# Patient Record
Sex: FEMALE | Race: WHITE | NOT HISPANIC OR LATINO | Employment: FULL TIME | URBAN - METROPOLITAN AREA
[De-identification: names, ages, dates, MRNs, and addresses within clinical notes are randomized per-mention and may not be internally consistent; named-entity substitution may affect disease eponyms.]

---

## 2018-06-27 ENCOUNTER — HOSPITAL ENCOUNTER (OUTPATIENT)
Facility: MEDICAL CENTER | Age: 28
DRG: 099 | End: 2018-06-27
Payer: COMMERCIAL

## 2018-06-28 ENCOUNTER — HOSPITAL ENCOUNTER (INPATIENT)
Facility: MEDICAL CENTER | Age: 28
LOS: 4 days | DRG: 099 | End: 2018-07-02
Attending: INTERNAL MEDICINE | Admitting: INTERNAL MEDICINE
Payer: COMMERCIAL

## 2018-06-28 PROBLEM — G03.9 MENINGITIS: Status: ACTIVE | Noted: 2018-06-28

## 2018-06-28 PROBLEM — E66.811 CLASS 1 OBESITY DUE TO EXCESS CALORIES WITHOUT SERIOUS COMORBIDITY WITH BODY MASS INDEX (BMI) OF 32.0 TO 32.9 IN ADULT: Status: ACTIVE | Noted: 2018-06-28

## 2018-06-28 PROBLEM — G43.009 MIGRAINE WITHOUT AURA AND WITHOUT STATUS MIGRAINOSUS, NOT INTRACTABLE: Status: ACTIVE | Noted: 2018-06-28

## 2018-06-28 PROBLEM — E66.09 CLASS 1 OBESITY DUE TO EXCESS CALORIES WITHOUT SERIOUS COMORBIDITY WITH BODY MASS INDEX (BMI) OF 32.0 TO 32.9 IN ADULT: Status: ACTIVE | Noted: 2018-06-28

## 2018-06-28 PROBLEM — O02.1 FETAL DEMISE BEFORE 20 WEEKS WITH RETENTION OF DEAD FETUS: Status: ACTIVE | Noted: 2018-06-28

## 2018-06-28 LAB
ANION GAP SERPL CALC-SCNC: 8 MMOL/L (ref 0–11.9)
B-HCG SERPL-ACNC: ABNORMAL MIU/ML (ref 0–5)
BASOPHILS # BLD AUTO: 0.3 % (ref 0–1.8)
BASOPHILS # BLD: 0.02 K/UL (ref 0–0.12)
BUN SERPL-MCNC: 6 MG/DL (ref 8–22)
CALCIUM SERPL-MCNC: 7.6 MG/DL (ref 8.5–10.5)
CHLORIDE SERPL-SCNC: 109 MMOL/L (ref 96–112)
CO2 SERPL-SCNC: 24 MMOL/L (ref 20–33)
CREAT SERPL-MCNC: 0.56 MG/DL (ref 0.5–1.4)
EOSINOPHIL # BLD AUTO: 0.16 K/UL (ref 0–0.51)
EOSINOPHIL NFR BLD: 2.6 % (ref 0–6.9)
ERYTHROCYTE [DISTWIDTH] IN BLOOD BY AUTOMATED COUNT: 47.5 FL (ref 35.9–50)
EST. AVERAGE GLUCOSE BLD GHB EST-MCNC: 103 MG/DL
GLUCOSE SERPL-MCNC: 94 MG/DL (ref 65–99)
HBA1C MFR BLD: 5.2 % (ref 0–5.6)
HCT VFR BLD AUTO: 34.7 % (ref 37–47)
HGB BLD-MCNC: 11.7 G/DL (ref 12–16)
IMM GRANULOCYTES # BLD AUTO: 0.02 K/UL (ref 0–0.11)
IMM GRANULOCYTES NFR BLD AUTO: 0.3 % (ref 0–0.9)
LYMPHOCYTES # BLD AUTO: 2.28 K/UL (ref 1–4.8)
LYMPHOCYTES NFR BLD: 37.1 % (ref 22–41)
MCH RBC QN AUTO: 29.4 PG (ref 27–33)
MCHC RBC AUTO-ENTMCNC: 33.7 G/DL (ref 33.6–35)
MCV RBC AUTO: 87.2 FL (ref 81.4–97.8)
MONOCYTES # BLD AUTO: 0.61 K/UL (ref 0–0.85)
MONOCYTES NFR BLD AUTO: 9.9 % (ref 0–13.4)
NEUTROPHILS # BLD AUTO: 3.06 K/UL (ref 2–7.15)
NEUTROPHILS NFR BLD: 49.8 % (ref 44–72)
NRBC # BLD AUTO: 0 K/UL
NRBC BLD-RTO: 0 /100 WBC
PLATELET # BLD AUTO: 202 K/UL (ref 164–446)
PMV BLD AUTO: 9.6 FL (ref 9–12.9)
POTASSIUM SERPL-SCNC: 3.2 MMOL/L (ref 3.6–5.5)
RBC # BLD AUTO: 3.98 M/UL (ref 4.2–5.4)
SODIUM SERPL-SCNC: 141 MMOL/L (ref 135–145)
VANCOMYCIN TROUGH SERPL-MCNC: 12.1 UG/ML (ref 10–20)
WBC # BLD AUTO: 6.2 K/UL (ref 4.8–10.8)

## 2018-06-28 PROCEDURE — A9270 NON-COVERED ITEM OR SERVICE: HCPCS | Performed by: HOSPITALIST

## 2018-06-28 PROCEDURE — 770020 HCHG ROOM/CARE - TELE (206)

## 2018-06-28 PROCEDURE — 85025 COMPLETE CBC W/AUTO DIFF WBC: CPT

## 2018-06-28 PROCEDURE — 80202 ASSAY OF VANCOMYCIN: CPT

## 2018-06-28 PROCEDURE — 700102 HCHG RX REV CODE 250 W/ 637 OVERRIDE(OP): Performed by: HOSPITALIST

## 2018-06-28 PROCEDURE — 700105 HCHG RX REV CODE 258: Performed by: HOSPITALIST

## 2018-06-28 PROCEDURE — 83036 HEMOGLOBIN GLYCOSYLATED A1C: CPT

## 2018-06-28 PROCEDURE — 80048 BASIC METABOLIC PNL TOTAL CA: CPT

## 2018-06-28 PROCEDURE — 700111 HCHG RX REV CODE 636 W/ 250 OVERRIDE (IP): Performed by: HOSPITALIST

## 2018-06-28 PROCEDURE — 84702 CHORIONIC GONADOTROPIN TEST: CPT

## 2018-06-28 PROCEDURE — 99223 1ST HOSP IP/OBS HIGH 75: CPT | Performed by: HOSPITALIST

## 2018-06-28 PROCEDURE — 36415 COLL VENOUS BLD VENIPUNCTURE: CPT

## 2018-06-28 PROCEDURE — 99255 IP/OBS CONSLTJ NEW/EST HI 80: CPT | Performed by: INTERNAL MEDICINE

## 2018-06-28 RX ORDER — AMOXICILLIN 250 MG
2 CAPSULE ORAL 2 TIMES DAILY
Status: DISCONTINUED | OUTPATIENT
Start: 2018-06-28 | End: 2018-07-02 | Stop reason: HOSPADM

## 2018-06-28 RX ORDER — ONDANSETRON 4 MG/1
4 TABLET, ORALLY DISINTEGRATING ORAL EVERY 4 HOURS PRN
Status: DISCONTINUED | OUTPATIENT
Start: 2018-06-28 | End: 2018-07-02 | Stop reason: HOSPADM

## 2018-06-28 RX ORDER — POTASSIUM CHLORIDE 20 MEQ/1
60 TABLET, EXTENDED RELEASE ORAL
Status: COMPLETED | OUTPATIENT
Start: 2018-06-28 | End: 2018-06-28

## 2018-06-28 RX ORDER — PROMETHAZINE HYDROCHLORIDE 25 MG/1
12.5-25 SUPPOSITORY RECTAL EVERY 4 HOURS PRN
Status: DISCONTINUED | OUTPATIENT
Start: 2018-06-28 | End: 2018-07-02 | Stop reason: HOSPADM

## 2018-06-28 RX ORDER — SODIUM CHLORIDE 9 MG/ML
INJECTION, SOLUTION INTRAVENOUS CONTINUOUS
Status: DISCONTINUED | OUTPATIENT
Start: 2018-06-28 | End: 2018-07-01

## 2018-06-28 RX ORDER — BISACODYL 10 MG
10 SUPPOSITORY, RECTAL RECTAL
Status: DISCONTINUED | OUTPATIENT
Start: 2018-06-28 | End: 2018-07-02 | Stop reason: HOSPADM

## 2018-06-28 RX ORDER — CLONIDINE HYDROCHLORIDE 0.1 MG/1
0.1 TABLET ORAL EVERY 6 HOURS PRN
Status: DISCONTINUED | OUTPATIENT
Start: 2018-06-28 | End: 2018-06-28

## 2018-06-28 RX ORDER — ONDANSETRON 2 MG/ML
4 INJECTION INTRAMUSCULAR; INTRAVENOUS EVERY 4 HOURS PRN
Status: DISCONTINUED | OUTPATIENT
Start: 2018-06-28 | End: 2018-07-02 | Stop reason: HOSPADM

## 2018-06-28 RX ORDER — PROMETHAZINE HYDROCHLORIDE 25 MG/1
12.5-25 TABLET ORAL EVERY 4 HOURS PRN
Status: DISCONTINUED | OUTPATIENT
Start: 2018-06-28 | End: 2018-07-02 | Stop reason: HOSPADM

## 2018-06-28 RX ORDER — OXYCODONE HYDROCHLORIDE 5 MG/1
5 TABLET ORAL
Status: DISCONTINUED | OUTPATIENT
Start: 2018-06-28 | End: 2018-07-02 | Stop reason: HOSPADM

## 2018-06-28 RX ORDER — MORPHINE SULFATE 4 MG/ML
2 INJECTION, SOLUTION INTRAMUSCULAR; INTRAVENOUS
Status: DISCONTINUED | OUTPATIENT
Start: 2018-06-28 | End: 2018-07-02 | Stop reason: HOSPADM

## 2018-06-28 RX ORDER — OXYCODONE HYDROCHLORIDE 5 MG/1
2.5 TABLET ORAL
Status: DISCONTINUED | OUTPATIENT
Start: 2018-06-28 | End: 2018-07-02 | Stop reason: HOSPADM

## 2018-06-28 RX ORDER — ACETAMINOPHEN 325 MG/1
650 TABLET ORAL EVERY 6 HOURS PRN
Status: DISCONTINUED | OUTPATIENT
Start: 2018-06-28 | End: 2018-07-02 | Stop reason: HOSPADM

## 2018-06-28 RX ORDER — POLYETHYLENE GLYCOL 3350 17 G/17G
1 POWDER, FOR SOLUTION ORAL
Status: DISCONTINUED | OUTPATIENT
Start: 2018-06-28 | End: 2018-07-02 | Stop reason: HOSPADM

## 2018-06-28 RX ADMIN — ENOXAPARIN SODIUM 40 MG: 100 INJECTION SUBCUTANEOUS at 12:24

## 2018-06-28 RX ADMIN — AMPICILLIN SODIUM 2000 MG: 2 INJECTION, POWDER, FOR SOLUTION INTRAMUSCULAR; INTRAVENOUS at 12:25

## 2018-06-28 RX ADMIN — MORPHINE SULFATE 2 MG: 4 INJECTION INTRAVENOUS at 11:44

## 2018-06-28 RX ADMIN — SODIUM CHLORIDE: 9 INJECTION, SOLUTION INTRAVENOUS at 06:21

## 2018-06-28 RX ADMIN — SODIUM CHLORIDE: 9 INJECTION, SOLUTION INTRAVENOUS at 15:40

## 2018-06-28 RX ADMIN — ACYCLOVIR SODIUM 616 MG: 500 INJECTION, SOLUTION INTRAVENOUS at 06:38

## 2018-06-28 RX ADMIN — OXYCODONE HYDROCHLORIDE 5 MG: 5 TABLET ORAL at 12:33

## 2018-06-28 RX ADMIN — VANCOMYCIN HYDROCHLORIDE 1300 MG: 100 INJECTION, POWDER, LYOPHILIZED, FOR SOLUTION INTRAVENOUS at 15:40

## 2018-06-28 RX ADMIN — CEFTRIAXONE 2 G: 2 INJECTION, POWDER, FOR SOLUTION INTRAMUSCULAR; INTRAVENOUS at 06:02

## 2018-06-28 RX ADMIN — AMPICILLIN SODIUM 2000 MG: 2 INJECTION, POWDER, FOR SOLUTION INTRAMUSCULAR; INTRAVENOUS at 14:18

## 2018-06-28 RX ADMIN — MORPHINE SULFATE 2 MG: 4 INJECTION INTRAVENOUS at 22:50

## 2018-06-28 RX ADMIN — ACYCLOVIR SODIUM 616 MG: 500 INJECTION, SOLUTION INTRAVENOUS at 14:25

## 2018-06-28 RX ADMIN — PROCHLORPERAZINE EDISYLATE 10 MG: 5 INJECTION INTRAMUSCULAR; INTRAVENOUS at 14:18

## 2018-06-28 RX ADMIN — CEFTRIAXONE 2 G: 2 INJECTION, POWDER, FOR SOLUTION INTRAMUSCULAR; INTRAVENOUS at 21:49

## 2018-06-28 RX ADMIN — OXYCODONE HYDROCHLORIDE 5 MG: 5 TABLET ORAL at 21:49

## 2018-06-28 RX ADMIN — VANCOMYCIN HYDROCHLORIDE 1300 MG: 100 INJECTION, POWDER, LYOPHILIZED, FOR SOLUTION INTRAVENOUS at 22:42

## 2018-06-28 RX ADMIN — POTASSIUM CHLORIDE 60 MEQ: 1500 TABLET, EXTENDED RELEASE ORAL at 23:36

## 2018-06-28 RX ADMIN — OXYCODONE HYDROCHLORIDE 5 MG: 5 TABLET ORAL at 06:02

## 2018-06-28 RX ADMIN — OXYCODONE HYDROCHLORIDE 5 MG: 5 TABLET ORAL at 15:40

## 2018-06-28 ASSESSMENT — COPD QUESTIONNAIRES
DO YOU EVER COUGH UP ANY MUCUS OR PHLEGM?: NO/ONLY WITH OCCASIONAL COLDS OR INFECTIONS
IN THE PAST 12 MONTHS DO YOU DO LESS THAN YOU USED TO BECAUSE OF YOUR BREATHING PROBLEMS: DISAGREE/UNSURE
DURING THE PAST 4 WEEKS HOW MUCH DID YOU FEEL SHORT OF BREATH: NONE/LITTLE OF THE TIME
HAVE YOU SMOKED AT LEAST 100 CIGARETTES IN YOUR ENTIRE LIFE: NO/DON'T KNOW
COPD SCREENING SCORE: 0

## 2018-06-28 ASSESSMENT — COGNITIVE AND FUNCTIONAL STATUS - GENERAL
SUGGESTED CMS G CODE MODIFIER DAILY ACTIVITY: CH
MOBILITY SCORE: 24
DAILY ACTIVITIY SCORE: 24
SUGGESTED CMS G CODE MODIFIER MOBILITY: CH

## 2018-06-28 ASSESSMENT — ENCOUNTER SYMPTOMS
CARDIOVASCULAR NEGATIVE: 1
PSYCHIATRIC NEGATIVE: 1
RESPIRATORY NEGATIVE: 1
FEVER: 1
HEADACHES: 1
EYES NEGATIVE: 1
CHILLS: 1
GASTROINTESTINAL NEGATIVE: 1
BACK PAIN: 1
NECK PAIN: 1

## 2018-06-28 ASSESSMENT — PATIENT HEALTH QUESTIONNAIRE - PHQ9
SUM OF ALL RESPONSES TO PHQ9 QUESTIONS 1 AND 2: 0
1. LITTLE INTEREST OR PLEASURE IN DOING THINGS: NOT AT ALL
2. FEELING DOWN, DEPRESSED, IRRITABLE, OR HOPELESS: NOT AT ALL

## 2018-06-28 ASSESSMENT — PAIN SCALES - GENERAL
PAINLEVEL_OUTOF10: 3
PAINLEVEL_OUTOF10: 7
PAINLEVEL_OUTOF10: 6
PAINLEVEL_OUTOF10: 6
PAINLEVEL_OUTOF10: 5
PAINLEVEL_OUTOF10: 5
PAINLEVEL_OUTOF10: 3
PAINLEVEL_OUTOF10: 7
PAINLEVEL_OUTOF10: 6

## 2018-06-28 ASSESSMENT — LIFESTYLE VARIABLES
HOW MANY TIMES IN THE PAST YEAR HAVE YOU HAD 5 OR MORE DRINKS IN A DAY: 0
HAVE YOU EVER FELT YOU SHOULD CUT DOWN ON YOUR DRINKING: NO
EVER_SMOKED: NEVER
TOTAL SCORE: 0
ALCOHOL_USE: YES
AVERAGE NUMBER OF DAYS PER WEEK YOU HAVE A DRINK CONTAINING ALCOHOL: 1
EVER HAD A DRINK FIRST THING IN THE MORNING TO STEADY YOUR NERVES TO GET RID OF A HANGOVER: NO
HAVE PEOPLE ANNOYED YOU BY CRITICIZING YOUR DRINKING: NO
ON A TYPICAL DAY WHEN YOU DRINK ALCOHOL HOW MANY DRINKS DO YOU HAVE: 2
EVER FELT BAD OR GUILTY ABOUT YOUR DRINKING: NO
TOTAL SCORE: 0
TOTAL SCORE: 0
CONSUMPTION TOTAL: NEGATIVE

## 2018-06-28 NOTE — CARE PLAN
Problem: Communication  Goal: The ability to communicate needs accurately and effectively will improve  Outcome: PROGRESSING AS EXPECTED  Discussed POC, all questions and concerns addressed. Discussed use of call light. Pt understands appropriate use.

## 2018-06-28 NOTE — PROGRESS NOTES
Direct Admit from Dr. Conley at [facility]. Dr. Lopez accepting for Meningitis. Patient on Droplet Precautions. ADT signed and held and will need to be released upon patient arrival. Patient arriving via ground transport.

## 2018-06-28 NOTE — ASSESSMENT & PLAN NOTE
Suspected bacterial meningitis  Still awaiting final culture results from LP done at [facility]  Remains on iv ceftriaxone  ID following   Clinically sx have resolved  following

## 2018-06-28 NOTE — PROGRESS NOTES
"Pharmacy Kinetics 28 y.o. female on vancomycin day # 2 2018    Currently Dose: Vancomycin 1300 mg iv q8hr (~14 mg/kg/dose)  Received Load Dose: No (dosed 19 mg/kg x1 at OSH)    Indication for Treatment: meningitis  ID Service Following: No    Pertinent history per medical record: Admitted on 2018 for suspected meningitis from [facility] ([place], [place]). Noted HA per patient prior to arrival. LP at outside facility pending confirmation.     Other antibiotics: ceftriaxone 2 gm iv q12h, acyclovir ~10 mg/kg q8h    Allergies: Radish [raphanus sativus]; Sulfa drugs; and Vancomycin     List concerns for accumulation of vancomycin: recent pregnancy (suspected on transfer H&P)    Pertinent cultures to date:   Results     ** No results found for the last 168 hours. **        Intake/Output Summary (Last 24 hours) at 18 0929  Last data filed at 18 0900   Gross per 24 hour   Intake              480 ml   Output                1 ml   Net              479 ml      Blood pressure 108/57, pulse 79, temperature 37.5 °C (99.5 °F), resp. rate 18, height 1.702 m (5' 7\"), weight 92.7 kg (204 lb 5.9 oz), SpO2 96 %, not currently breastfeeding. Temp (24hrs), Av.6 °C (99.6 °F), Min:37.1 °C (98.7 °F), Max:38.1 °C (100.6 °F)    A/P   1. Vancomycin dose change: not indicated   2. Next vancomycin level: 18 @2130  3. Goal trough: 18-22 mcg/mL  4. Comments: VS stable. Febrile to Tmax 100.6 °F. Microbiology pending. [facility] C/S results pending. SCr unremarkable on transfer documentation. Factors for accumulation noted. Trough in place prior to PM dose to ensure clearance. Pharmacy will follow and continue to adjust as appropriate.    Jason Mckeon, PharmD  "

## 2018-06-28 NOTE — ASSESSMENT & PLAN NOTE
Patient still has not passed remains, no cramping or spotting  bhcg decreasing  Plans to follow up with her ob as an outpatient

## 2018-06-28 NOTE — H&P
Hospital Medicine History and Physical    Date of Service  6/28/2018    Chief Complaint  Headache     History of Presenting Illness  28 y.o. female with history of recurrent migraine disorder, recent miscarriage with current 6 week fetal demise was in her usual state of health until 2AM on 26 June 2018, when she had the sudden onset of severe headache.  She initially attributed this to migraine, however she reports that it was markedly worse than normal, and additionally associated with neck and back pain.  She denies any recent sick contacts, she initially had no fever or chills.  She subsequently presented to [place], where she was admitted for possible meningitis.  She did undergo brain imaging as well as lumbar puncture, and then was transferred to this facility for insurance reasons.  She does report worsening of her symptoms with light exposure, and does feel much better after the treatment she has already received.    Primary Care Physician  No primary care provider on file.    Consultants  none    Code Status  Full code     Review of Systems  Review of Systems   Constitutional: Positive for chills, fever and malaise/fatigue.   HENT: Negative.    Eyes: Negative.    Respiratory: Negative.    Cardiovascular: Negative.    Gastrointestinal: Negative.    Genitourinary: Negative.    Musculoskeletal: Positive for back pain and neck pain.   Skin: Negative.    Neurological: Positive for headaches.   Endo/Heme/Allergies: Negative.    Psychiatric/Behavioral: Negative.         Past Medical History  Past Medical History:   Diagnosis Date   • Migraine        Surgical History  Past Surgical History:   Procedure Laterality Date   • OTHER ORTHOPEDIC SURGERY         Medications  No current facility-administered medications on file prior to encounter.      No current outpatient prescriptions on file prior to encounter.       Family History  Family History   Problem Relation Age of Onset   • No Known Problems Mother    • No  Known Problems Father        Social History  Social History   Substance Use Topics   • Smoking status: Former Smoker   • Smokeless tobacco: Never Used   • Alcohol use Not on file       Allergies  Allergies   Allergen Reactions   • Radish [Raphanus Sativus] Hives   • Sulfa Drugs Hives   • Vancomycin Rash     Pt currently red and blotchy on anterior and posterior thoracic area from previous dose of Vanco.        Physical Exam  Laboratory   Hemodynamics  Temp (24hrs), Av.6 °C (99.7 °F), Min:37.1 °C (98.7 °F), Max:38.1 °C (100.6 °F)   Temperature: (!) 38.1 °C (100.6 °F) (RN notified)  Pulse  Av.5  Min: 71  Max: 74    Blood Pressure: (!) 97/47 (RN notified)      Respiratory      Respiration: 12, Pulse Oximetry: 96 %        RUL Breath Sounds: Clear, RML Breath Sounds: Diminished, RLL Breath Sounds: Diminished, CAS Breath Sounds: Clear, LLL Breath Sounds: Diminished    Physical Exam   Constitutional: She is oriented to person, place, and time. She appears well-developed and well-nourished. No distress.   HENT:   Head: Normocephalic and atraumatic.   Eyes: Conjunctivae are normal. Pupils are equal, round, and reactive to light.   Neck: Normal range of motion. Neck supple. No tracheal deviation present. No thyromegaly present.   Cardiovascular: Normal rate, regular rhythm and normal heart sounds.  Exam reveals no gallop and no friction rub.    No murmur heard.  Pulmonary/Chest: Effort normal and breath sounds normal. No respiratory distress. She has no wheezes. She has no rales.   Abdominal: Soft. Bowel sounds are normal. She exhibits no distension. There is no tenderness. There is no rebound.   Musculoskeletal: Normal range of motion. She exhibits no edema.   Lymphadenopathy:     She has no cervical adenopathy.   Neurological: She is alert and oriented to person, place, and time. No cranial nerve deficit.   Skin: Skin is warm and dry. She is not diaphoretic.   Psychiatric: She has a normal mood and affect.    Nursing note and vitals reviewed.        No labs have yet arrived from [place].  They have been requested.    Phone signout of LP results:     glucose 38, prot 108, , 87% lymphs         No results for input(s): ALTSGPT, ASTSGOT, ALKPHOSPHAT, TBILIRUBIN, DBILIRUBIN, GAMMAGT, AMYLASE, LIPASE, ALB, PREALBUMIN, GLUCOSE in the last 72 hours.              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    * Meningitis   Assessment & Plan    Concern for bacterial versus viral meningitis.  LP results from [place] not officially arrived, however sign out received from that facility with  Lp revealed glu 38, prot 108, , 87% lymphs.  Not clearly bacterial or viral.  Will need culture results from [place] which are pending.  For now will continue with current treatment regimen of rocephin, vancomycin and acyclovir.  Spoke with nursing to obtain full records from [facility].           Migraine without aura and without status migrainosus, not intractable   Assessment & Plan    Chronic.  No current evidence of the same.         Class 1 obesity due to excess calories without serious comorbidity with body mass index (BMI) of 32.0 to 32.9 in adult   Assessment & Plan    Body mass index is 32.01 kg/m².          Fetal demise before 20 weeks with retention of dead fetus   Assessment  & Plan    Unclear if related to meningitis.  Has not passed fetal remains at this point.  Monitor.             VTE prophylaxis: SCD, Lovenox.

## 2018-06-28 NOTE — CONSULTS
DATE OF SERVICE:  06/28/2018    INFECTIOUS DISEASE CONSULTATION    REASON FOR CONSULT:  Meningitis.    CONSULTING PHYSICIAN:  Dr. Billingsley and Dr. Bangura.    HISTORY OF PRESENT ILLNESS:  This is an unfortunate 28-year-old white female   with a history of recurrent migraines and recent miscarriage with retained   products with retained fetus, was in her usual state of health until 2 days   prior to admission when she had the sudden onset of severe headache.  She   states it was different than her usual migraine.  It was much more severe and   was associated with neck pain and back pain.  She has had no recent travel, no   animal exposures and no sick contacts.  She states initially she had no fever   or chills, but she went to [facility] for evaluation.  She underwent brain   imaging and lumbar puncture.  Spinal fluid was concerning for meningitis, so   she was started on acyclovir, vancomycin, ceftriaxone, and transferred to   [facility] due to insurance.  Currently her CSF Gram stain is not   available.  Records from the  and  were reviewed and she did have   significant leukocytosis of 350 in her spinal fluid with decreased glucose,   elevated protein, and lymphocytic predominance.  Cultures are pending.  She   did have MRV, which showed no thrombosis.  An MRI was normal.  She is   continuing on the vancomycin, ceftriaxone, ampicillin, acyclovir and infectious disease   consulted for antibiotic recommendations and management.  She states that she   has been followed by OB/GYN and her hCGs are decreasing, but as far she knows,   she has not passed any products of conception.    ALLERGIES:  SHE IS ALLERGIC TO RADISH.  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    REVIEW OF SYSTEMS:  All other systems are reviewed and are negative.    PAST MEDICAL HISTORY:  Migraines and recent fetal demise.    FAMILY HISTORY:  Breast cancer and hypertension.    SOCIAL  HISTORY:  She is a former smoker, does not drink or use illicit drugs.    PHYSICAL EXAMINATION:  VITAL SIGNS:  T-max on admission was 100.6, current temperature 98.8, blood   pressure 104/52, pulse 79, respiratory rate 18, oxygen saturation 97% on room   air.  She weighs 93 kilos.  HEENT:  Normocephalic, atraumatic.  Pupils equal, round, and reactive to   light.  Extraocular movements are intact.  She has anicteric sclerae.  There   is no conjunctivitis.  NECK:  Supple.  CARDIOVASCULAR:  Regular rate and rhythm, unable to auscultate any murmurs.  CHEST:  Clear to auscultation bilaterally, unlabored.  ABDOMEN:  Soft, nontender.  There is no rebound or guarding.  EXTREMITIES:  Show no cyanosis, clubbing, or edema.  NEUROLOGIC:  She is awake, alert.  Cranial nerves are intact.  She is moving   all extremities.    LABORATORY DATA:  There are none here.  Labs from [place] showed the CSF   350 wbc's, glucose 38, protein of 108, 87% lymphs.  By report, MRI of the   brain and MRV of the brain were both normal.    ASSESSMENT AND PLAN:  1.  A 28-year-old white female who is in the hospital with acute meningitis.    She currently has low grade fevers.  No reported leukocytosis; however,   current CBC is not available; we will order it as well as BMP.  As her fetal   demise was several weeks ago, we will discontinue the ampicillin, continue on   vancomycin and ceftriaxone for the time being.  Her symptoms are not   consistent with herpes encephalitis, so we will discontinue acyclovir.  We   will attempt to obtain spinal fluid results from [place], specifically the   Gram stain.  We will get a BMP as well as she is on vancomycin and there is   always concern for nephrotoxicity.  2.  She does have a fetal demise.  Beta hCG is measured.  Continue to monitor   for complications related to retained products of conception.  Discussed with   internal medicine.    Thank you and we will follow with you.        ____________________________________     MD ADRIANA MORGAN / KAL    DD:  06/28/2018 14:33:03  DT:  06/28/2018 16:12:39    D#:  [id number]  Job#:  [id number]

## 2018-06-28 NOTE — PROGRESS NOTES
Assumed care of pt from [facility]. Report received from [facility] RN. Pt is a&ox4 and is complaining of pain in her head. She is on droplet precautions. Educated about use of call light. Updated on POC/all questions and concerns addressed, MD notified of pt's arrival. Bed is locked in the lowest position, personal belongings and call light are within reach.

## 2018-06-28 NOTE — PROGRESS NOTES
"Pharmacy Kinetics 28 y.o. female on vancomycin day # 1 2018    Currently on Vancomycin 1300 mg iv q8hr    Indication for Treatment: Meningitis    Pertinent history per medical record: Admitted on 2018 for meningitis.  Patient woke up the other night with a severe headache.  An LP was performed at [facility] which is consistent with bacterial/viral meningitis.  Empiric antibiotics initiated.      Other antibiotics: Rocephin 2g q12hr, Acyclovir 10 mg/kg (ideal) q8hr    Allergies: Radish [raphanus sativus]; Sulfa drugs; and Vancomycin     List concerns for renal function: IV acyclovir       No results for input(s): WBC, NEUTSPOLYS, BANDSSTABS in the last 72 hours.  No results for input(s): BUN, CREATININE, ALBUMIN in the last 72 hours.  No results for input(s): VANCOTROUGH, VANCOPEAK, VANCORANDOM in the last 72 hours.No intake or output data in the 24 hours ending 18 0518   Blood pressure 108/52, pulse 74, temperature 37.1 °C (98.7 °F), resp. rate 12, height 1.702 m (5' 7\"), weight 92.7 kg (204 lb 5.9 oz), SpO2 98 %, not currently breastfeeding. Temp (24hrs), Av.1 °C (98.7 °F), Min:37.1 °C (98.7 °F), Max:37.1 °C (98.7 °F)      A/P   1. Vancomycin dose change: New start  2. Next vancomycin level: Prior to 4th dose (not ordered)  3. Goal trough: 18-22 mcg/mL  4. Comments: Vancomycin started per protocol for rule out meningitis.  Patient was given vancomycin 1800 mg (~19 mg/kg) at 2100 on  at the outlying facility.  Will start a q8 hr maintenance dose and recommend a trough level prior to the 4th dose.  Pharmacy will continue to follow.      Too Juarez, PharmD      "

## 2018-06-28 NOTE — PROGRESS NOTES
Pt seen   Exam stable  Neck and head pain 4/10, mild photophobia  She states that she was told in the ob office that her pregnancy was non viable  Discussed with Dr. Harrington ob, who cannot confirm this 100% - but it is likely, repeat bhcg pending  Records from [facility] pending  ID to [facility]

## 2018-06-28 NOTE — DISCHARGE PLANNING
Anticipated Discharge Disposition: Home    Action: LSW spoke with pt at bedside to inquire about pt's demographics. Pt has a  (Johny Lambert- #[phone]) and mother (Thais Trivedi- #[phone]). Pt stated she recently was pregnant but lost the baby.    LSW obtained a copy of pt's insurance and 's license and faxed to [facility] ([id number])    LSW updated facesheet with new information.      Barriers to Discharge: None    Plan: Awaiting medical clearance. No current discharge needs.     Care Transition Team Assessment    Information Source  Orientation : Oriented x 4  Information Given By: Patient  Who is responsible for making decisions for patient? : Patient    Readmission Evaluation  Is this a readmission?: No    Elopement Risk  Legal Hold: No  Ambulatory or Self Mobile in Wheelchair: Yes  Disoriented: No  Psychiatric Symptoms: None  History of Wandering: No  Elopement this Admit: No  Vocalizing Wanting to Leave: No  Displays Behaviors, Body Language Wanting to Leave: No-Not at Risk for Elopement  Elopement Risk: Not at Risk for Elopement    Interdisciplinary Discharge Planning  Does Admitting Nurse Feel This Could be a Complex Discharge?: No  Primary Care Physician: Arabella Gonzalez  Lives with - Patient's Self Care Capacity: Spouse  Patient or legal guardian wants to designate a caregiver (see row info): No  Support Systems: Spouse / Significant Other  Housing / Facility: [address]  Do You Take your Prescribed Medications Regularly:  (N/a)  Able to Return to Previous ADL's: Yes  Mobility Issues: No  Prior Services: None  Patient Expects to be Discharged to:: Home  Assistance Needed: No  Durable Medical Equipment: Not Applicable    Discharge Preparedness  What is your plan after discharge?:  (Home)  What are your discharge supports?: Spouse, Parent  Prior Functional Level: Independent with Activities of Daily Living, Independent with Medication Management  Difficulity with ADLs: None  Difficulity with  IADLs: None    Functional Assesment  Prior Functional Level: Independent with Activities of Daily Living, Independent with Medication Management    Finances  Financial Barriers to Discharge: No    Vision / Hearing Impairment  Vision Impairment : Yes  Right Eye Vision: Impaired, Wears Glasses  Left Eye Vision: Impaired, Wears Glasses  Hearing Impairment : No    Values / Beliefs / Concerns  Values / Beliefs Concerns : No         Domestic Abuse  Have you ever been the victim of abuse or violence?: No  Physical Abuse or Sexual Abuse: No  Verbal Abuse or Emotional Abuse: No  Possible Abuse Reported to:: Not Applicable    Psychological Assessment  History of Substance Abuse: None  History of Psychiatric Problems: No  Non-compliant with Treatment: No    Discharge Risks or Barriers  Discharge risks or barriers?: Complex medical needs  Patient risk factors: Recent loss (Pt recently miscarried. )    Anticipated Discharge Information  Anticipated discharge disposition: Home  Discharge Address: [address] [place] [address]  Discharge Contact Phone Number: [phone]

## 2018-06-28 NOTE — PROGRESS NOTES
Paged Dr Gabino Cohn at [facility] office for Dr Billingsley for a call back. Left message to call her as soon as possible.

## 2018-06-29 ENCOUNTER — PATIENT OUTREACH (OUTPATIENT)
Dept: HEALTH INFORMATION MANAGEMENT | Facility: OTHER | Age: 28
End: 2018-06-29

## 2018-06-29 LAB
ANION GAP SERPL CALC-SCNC: 8 MMOL/L (ref 0–11.9)
BASOPHILS # BLD AUTO: 0.9 % (ref 0–1.8)
BASOPHILS # BLD: 0.05 K/UL (ref 0–0.12)
BUN SERPL-MCNC: 4 MG/DL (ref 8–22)
CALCIUM SERPL-MCNC: 8.1 MG/DL (ref 8.5–10.5)
CHLORIDE SERPL-SCNC: 108 MMOL/L (ref 96–112)
CO2 SERPL-SCNC: 22 MMOL/L (ref 20–33)
CREAT SERPL-MCNC: 0.52 MG/DL (ref 0.5–1.4)
EOSINOPHIL # BLD AUTO: 0.1 K/UL (ref 0–0.51)
EOSINOPHIL NFR BLD: 1.7 % (ref 0–6.9)
ERYTHROCYTE [DISTWIDTH] IN BLOOD BY AUTOMATED COUNT: 48.1 FL (ref 35.9–50)
GLUCOSE SERPL-MCNC: 85 MG/DL (ref 65–99)
HCT VFR BLD AUTO: 35.5 % (ref 37–47)
HGB BLD-MCNC: 11.5 G/DL (ref 12–16)
IMM GRANULOCYTES # BLD AUTO: 0.01 K/UL (ref 0–0.11)
IMM GRANULOCYTES NFR BLD AUTO: 0.2 % (ref 0–0.9)
LYMPHOCYTES # BLD AUTO: 2.42 K/UL (ref 1–4.8)
LYMPHOCYTES NFR BLD: 41.2 % (ref 22–41)
MCH RBC QN AUTO: 28.9 PG (ref 27–33)
MCHC RBC AUTO-ENTMCNC: 32.4 G/DL (ref 33.6–35)
MCV RBC AUTO: 89.2 FL (ref 81.4–97.8)
MONOCYTES # BLD AUTO: 0.54 K/UL (ref 0–0.85)
MONOCYTES NFR BLD AUTO: 9.2 % (ref 0–13.4)
NEUTROPHILS # BLD AUTO: 2.76 K/UL (ref 2–7.15)
NEUTROPHILS NFR BLD: 46.8 % (ref 44–72)
NRBC # BLD AUTO: 0 K/UL
NRBC BLD-RTO: 0 /100 WBC
PLATELET # BLD AUTO: 203 K/UL (ref 164–446)
PMV BLD AUTO: 10.1 FL (ref 9–12.9)
POTASSIUM SERPL-SCNC: 3.6 MMOL/L (ref 3.6–5.5)
RBC # BLD AUTO: 3.98 M/UL (ref 4.2–5.4)
SODIUM SERPL-SCNC: 138 MMOL/L (ref 135–145)
WBC # BLD AUTO: 5.9 K/UL (ref 4.8–10.8)

## 2018-06-29 PROCEDURE — 700105 HCHG RX REV CODE 258: Performed by: HOSPITALIST

## 2018-06-29 PROCEDURE — 99232 SBSQ HOSP IP/OBS MODERATE 35: CPT | Performed by: HOSPITALIST

## 2018-06-29 PROCEDURE — 36415 COLL VENOUS BLD VENIPUNCTURE: CPT

## 2018-06-29 PROCEDURE — 80048 BASIC METABOLIC PNL TOTAL CA: CPT

## 2018-06-29 PROCEDURE — 85025 COMPLETE CBC W/AUTO DIFF WBC: CPT

## 2018-06-29 PROCEDURE — 700111 HCHG RX REV CODE 636 W/ 250 OVERRIDE (IP): Performed by: HOSPITALIST

## 2018-06-29 PROCEDURE — A9270 NON-COVERED ITEM OR SERVICE: HCPCS | Performed by: HOSPITALIST

## 2018-06-29 PROCEDURE — 700102 HCHG RX REV CODE 250 W/ 637 OVERRIDE(OP): Performed by: HOSPITALIST

## 2018-06-29 PROCEDURE — 770020 HCHG ROOM/CARE - TELE (206)

## 2018-06-29 PROCEDURE — 99232 SBSQ HOSP IP/OBS MODERATE 35: CPT | Performed by: INTERNAL MEDICINE

## 2018-06-29 RX ADMIN — CEFTRIAXONE 2 G: 2 INJECTION, POWDER, FOR SOLUTION INTRAMUSCULAR; INTRAVENOUS at 17:05

## 2018-06-29 RX ADMIN — MORPHINE SULFATE 2 MG: 4 INJECTION INTRAVENOUS at 17:05

## 2018-06-29 RX ADMIN — OXYCODONE HYDROCHLORIDE 5 MG: 5 TABLET ORAL at 14:47

## 2018-06-29 RX ADMIN — VANCOMYCIN HYDROCHLORIDE 500 MG: 100 INJECTION, POWDER, LYOPHILIZED, FOR SOLUTION INTRAVENOUS at 01:39

## 2018-06-29 RX ADMIN — OXYCODONE HYDROCHLORIDE 5 MG: 5 TABLET ORAL at 08:56

## 2018-06-29 RX ADMIN — OXYCODONE HYDROCHLORIDE 5 MG: 5 TABLET ORAL at 01:44

## 2018-06-29 RX ADMIN — ENOXAPARIN SODIUM 40 MG: 100 INJECTION SUBCUTANEOUS at 05:06

## 2018-06-29 RX ADMIN — OXYCODONE HYDROCHLORIDE 5 MG: 5 TABLET ORAL at 18:10

## 2018-06-29 RX ADMIN — STANDARDIZED SENNA CONCENTRATE AND DOCUSATE SODIUM 2 TABLET: 8.6; 5 TABLET, FILM COATED ORAL at 05:05

## 2018-06-29 RX ADMIN — CEFTRIAXONE 2 G: 2 INJECTION, POWDER, FOR SOLUTION INTRAMUSCULAR; INTRAVENOUS at 05:04

## 2018-06-29 RX ADMIN — OXYCODONE HYDROCHLORIDE 5 MG: 5 TABLET ORAL at 20:44

## 2018-06-29 RX ADMIN — MORPHINE SULFATE 2 MG: 4 INJECTION INTRAVENOUS at 23:18

## 2018-06-29 RX ADMIN — OXYCODONE HYDROCHLORIDE 5 MG: 5 TABLET ORAL at 05:05

## 2018-06-29 RX ADMIN — VANCOMYCIN HYDROCHLORIDE 1300 MG: 100 INJECTION, POWDER, LYOPHILIZED, FOR SOLUTION INTRAVENOUS at 05:42

## 2018-06-29 RX ADMIN — ONDANSETRON 4 MG: 4 TABLET, ORALLY DISINTEGRATING ORAL at 20:44

## 2018-06-29 ASSESSMENT — ENCOUNTER SYMPTOMS
ABDOMINAL PAIN: 0
MUSCULOSKELETAL NEGATIVE: 1
GASTROINTESTINAL NEGATIVE: 1
SEIZURES: 0
COUGH: 0
FEVER: 0
VOMITING: 0
FOCAL WEAKNESS: 0
SENSORY CHANGE: 0
CARDIOVASCULAR NEGATIVE: 1
HEADACHES: 1
CHILLS: 0
PSYCHIATRIC NEGATIVE: 1
NAUSEA: 0
RESPIRATORY NEGATIVE: 1
SPEECH CHANGE: 0
EYES NEGATIVE: 1
CONSTITUTIONAL NEGATIVE: 1

## 2018-06-29 ASSESSMENT — PAIN SCALES - GENERAL
PAINLEVEL_OUTOF10: 2
PAINLEVEL_OUTOF10: 4
PAINLEVEL_OUTOF10: 1
PAINLEVEL_OUTOF10: 6
PAINLEVEL_OUTOF10: 2
PAINLEVEL_OUTOF10: 5
PAINLEVEL_OUTOF10: 0
PAINLEVEL_OUTOF10: 4
PAINLEVEL_OUTOF10: 6

## 2018-06-29 NOTE — PROGRESS NOTES
Infectious Disease Progress Note    Author: Raven Alvarez M.D. Date & Time of service: 2018  4:26 PM    Chief Complaint:  Meningitis.      Interval History:  28-year-old white female who is in the hospital with acute meningitis   AF ,WBC 5.9 HA much improved-denies SE abx today  Labs Reviewed, Medications Reviewed and Radiology Reviewed.    Review of Systems:  Review of Systems   Constitutional: Negative for chills and fever.   Respiratory: Negative for cough.    Cardiovascular: Negative for chest pain.   Gastrointestinal: Negative for abdominal pain, nausea and vomiting.   Genitourinary: Negative for dysuria.   Skin: Negative for itching and rash.   Neurological: Positive for headaches. Negative for sensory change, speech change, focal weakness and seizures.        Slght       Hemodynamics:  Temp (24hrs), Av.1 °C (98.7 °F), Min:36.4 °C (97.6 °F), Max:37.3 °C (99.2 °F)  Temperature: 36.8 °C (98.3 °F)  Pulse  Av.5  Min: 64  Max: 79   Blood Pressure: 125/62       Physical Exam:  Physical Exam   Constitutional: She is oriented to person, place, and time. She appears well-developed.   HENT:   Head: Normocephalic and atraumatic.   Mouth/Throat: No oropharyngeal exudate.   Eyes: EOM are normal. Pupils are equal, round, and reactive to light.   Neck: Neck supple.   Cardiovascular: Normal rate.    Pulmonary/Chest: Effort normal. No respiratory distress.   Abdominal: Soft. She exhibits no distension. There is no tenderness.   Musculoskeletal: She exhibits no edema.   Lymphadenopathy:     She has no cervical adenopathy.   Neurological: She is alert and oriented to person, place, and time. Coordination normal.   Skin: No rash noted. She is not diaphoretic.   Nursing note and vitals reviewed.      Meds:    Current Facility-Administered Medications:   •  NS  •  enoxaparin  •  ondansetron  •  ondansetron  •  promethazine  •  promethazine  •  prochlorperazine  •  senna-docusate **AND** polyethylene  glycol/lytes **AND** magnesium hydroxide **AND** bisacodyl  •  acetaminophen  •  Notify provider if pain remains uncontrolled **AND** Use the numeric rating scale (NRS-11) on regular floors and Critical-Care Pain Observation Tool (CPOT) on ICUs/Trauma to assess pain **AND** Pulse Ox (Oximetry) **AND** Pharmacy Consult Request **AND** If patient difficult to arouse and/or has respiratory depression, stop any opiates that are currently infusing and call a Rapid Response. **AND** oxyCODONE immediate-release **AND** oxyCODONE immediate-release **AND** morphine injection  •  cefTRIAXone (ROCEPHIN) IV    Labs:  Recent Labs      06/28/18   1605  06/29/18   0438   WBC  6.2  5.9   RBC  3.98*  3.98*   HEMOGLOBIN  11.7*  11.5*   HEMATOCRIT  34.7*  35.5*   MCV  87.2  89.2   MCH  29.4  28.9   RDW  47.5  48.1   PLATELETCT  202  203   MPV  9.6  10.1   NEUTSPOLYS  49.80  46.80   LYMPHOCYTES  37.10  41.20*   MONOCYTES  9.90  9.20   EOSINOPHILS  2.60  1.70   BASOPHILS  0.30  0.90     Recent Labs      06/28/18   1605  06/29/18   0438   SODIUM  141  138   POTASSIUM  3.2*  3.6   CHLORIDE  109  108   CO2  24  22   GLUCOSE  94  85   BUN  6*  4*     Recent Labs      06/28/18   1605  06/29/18   0438   CREATININE  0.56  0.52       Imaging:  No results found.    Micro:  Results     ** No results found for the last 168 hours. **          Assessment:  Active Hospital Problems    Diagnosis   • *Meningitis [G03.9]   • Fetal demise before 20 weeks with retention of dead fetus [O02.1]   • Class 1 obesity due to excess calories without serious comorbidity with body mass index (BMI) of 32.0 to 32.9 in adult [E66.09, Z68.32]   • Migraine without aura and without status migrainosus, not intractable [G43.009]       Plan:  Meningitis.  Low grade fevers.    No leukocytosis  Await cx from [facility] Continue ceftriaxone for now  Can dc isolation    Fetal demise   Beta hCGelevated-slight decrease from previous value  Continue to monitor   for  complications related to retained products of conception      Discussed with FRANKI

## 2018-06-29 NOTE — PROGRESS NOTES
[facility]ist Progress Note    Date of Service: 2018    Chief Complaint  28 y.o. female admitted 2018 with meningitis    Interval Problem Update  States she is feeling better, no neck pain, mild frontal h/a 2/10  No photophobia  No cramping or abdominal pain  ROS otherwise negative      Consultants/Specialty  ID    Disposition          Review of Systems   Constitutional: Negative.    HENT: Negative.    Eyes: Negative.    Respiratory: Negative.    Cardiovascular: Negative.    Gastrointestinal: Negative.    Genitourinary: Negative.    Musculoskeletal: Negative.    Skin: Negative.    Neurological: Positive for headaches.   Psychiatric/Behavioral: Negative.       Physical Exam  Laboratory/Imaging   Hemodynamics  Temp (24hrs), Av.1 °C (98.7 °F), Min:36.4 °C (97.6 °F), Max:37.3 °C (99.2 °F)   Temperature: 36.8 °C (98.3 °F)  Pulse  Av.5  Min: 64  Max: 79    Blood Pressure: 125/62      Respiratory      Respiration: 16, Pulse Oximetry: 95 %        RUL Breath Sounds: Clear, RML Breath Sounds: Diminished, RLL Breath Sounds: Diminished, CAS Breath Sounds: Clear, LLL Breath Sounds: Diminished    Fluids  No intake or output data in the 24 hours ending 18 1623    Nutrition  Orders Placed This Encounter   Procedures   • Diet Order Regular     Standing Status:   Standing     Number of Occurrences:   1     Order Specific Question:   Diet:     Answer:   Regular [1]     Physical Exam   Constitutional: She is oriented to person, place, and time. She appears well-developed and well-nourished. No distress.   HENT:   Head: Normocephalic and atraumatic.   Mouth/Throat: Oropharynx is clear and moist.   Eyes: Conjunctivae are normal.   Neck: Normal range of motion. Neck supple.   Cardiovascular: Normal rate, normal heart sounds and intact distal pulses.  Exam reveals no gallop and no friction rub.    No murmur heard.  Pulmonary/Chest: Effort normal and breath sounds normal. No respiratory distress. She has no  wheezes. She has no rales. She exhibits no tenderness.   Abdominal: Soft. Bowel sounds are normal. She exhibits no distension and no mass. There is no tenderness. There is no rebound and no guarding.   Musculoskeletal: Normal range of motion. She exhibits no edema or tenderness.   Neurological: She is alert and oriented to person, place, and time. She has normal reflexes. No cranial nerve deficit. She exhibits normal muscle tone. Coordination normal.   Skin: Skin is warm and dry. No rash noted. She is not diaphoretic. No erythema. No pallor.   Psychiatric: She has a normal mood and affect. Her behavior is normal. Judgment and thought content normal.   Nursing note and vitals reviewed.      Recent Labs      06/28/18   1605  06/29/18   0438   WBC  6.2  5.9   RBC  3.98*  3.98*   HEMOGLOBIN  11.7*  11.5*   HEMATOCRIT  34.7*  35.5*   MCV  87.2  89.2   MCH  29.4  28.9   MCHC  33.7  32.4*   RDW  47.5  48.1   PLATELETCT  202  203   MPV  9.6  10.1     Recent Labs      06/28/18   1605  06/29/18   0438   SODIUM  141  138   POTASSIUM  3.2*  3.6   CHLORIDE  109  108   CO2  24  22   GLUCOSE  94  85   BUN  6*  4*   CREATININE  0.56  0.52   CALCIUM  7.6*  8.1*                      Assessment/Plan     * Meningitis   Assessment & Plan    Suspected bacterial meningitis  LP cultures from [facility] [place] pending  Remains on iv ceftriaxone  ID following   Clinically and subjectively improved        Class 1 obesity due to excess calories without serious comorbidity with body mass index (BMI) of 32.0 to 32.9 in adult   Assessment & Plan    Body mass index is 32.01 kg/m².          Fetal demise before 20 weeks with retention of dead fetus   Assessment & Plan    Pt has not passed remains  following          Quality-Core Measures

## 2018-06-29 NOTE — PROGRESS NOTES
Received report from nightshift RN, assumed care of patient. Patient is A&O x 4, bed alarm not indicated. Patient educated on importance of calling if in need of assistance. Verbalizes understanding. Patient with HA pain at this time, ice pack refilled. Patient updated on plan of care, voices no concerns at this time. Will continue to monitor for safety and comfort.

## 2018-06-29 NOTE — PROGRESS NOTES
Pharmacy Kinetics Addendum:    28 y.o. female on vancomycin day # 2 6/28/2018    Currently on Vancomycin 1300 mg iv q8hr (06, 14, 22)   1800 mg dose given @ 2100 on 6/27 (prior to transfer)    1300 mg dose given @ 1540 on 6/28    >Vanco level @ 2145 on 6/28 = 12.1 (~6 hours after second dose was administered)     Indication for Treatment: meningitis     Recent Labs      06/28/18   1605   BUN  6*   CREATININE  0.56     Results for HANNAH YIP (MRN [id number]) as of 6/28/2018 22:29   6/28/2018 21:45   Vancomycin Trough 12.1       A/P   1. Vancomycin dose change: will give one-time 500 mg dose @ 0200 on 6/29 (continue with 1300 mg IV q8h)  2. Next vancomycin level: 0530 on 6/30 (not yet ordered)   3. Goal trough: ~20  4. Comments: Cannot discern any clinically useful information from this level - dose due at 0600 was held due to possible allergy (likely infusion related reaction, not true allergy). Will give partial load in addition to scheduled dose. Patient's SCr has improved in comparison to labs from OSH. Limited risk factors identified for renal insult; acyclovir has been discontinued per ID. Would continue with 1300 mg IV q8h and obtain level after several doses to evaluate steady state plasma concentrations. Pharmacy will continue to monitor and adjust dosing or recommend de-escalation as appropriate.       Mireya Alaniz, PharmD

## 2018-06-29 NOTE — PROGRESS NOTES
"Pharmacy Kinetics 28 y.o. female on vancomycin day # 3  2018    Currently Dose: Vancomycin 1300 mg iv q8hr (~14 mg/kg)  Received Load Dose: No (given 500 mg iv x1 18)    Indication for Treatment: meninigits  ID Service Following: Yes       Pertinent history per medical record: Admitted on 2018 for suspected meningitis from [facility] ([place], [place]). Noted HA per patient prior to arrival. LP at outside facility pending confirmation.     Other antibiotics: ceftriaxone 2 gm iv q12h    Allergies: Radish [raphanus sativus]; Sulfa drugs; Vancomycin; and Other food     List concerns for accumulation of vancomycin: recent pregnancy (suspected on transfer H&P)    Pertinent cultures to date:   Results     ** No results found for the last 168 hours. **        Recent Labs      18   1605  18   0438   WBC  6.2  5.9   NEUTSPOLYS  49.80  46.80     Recent Labs      18   1605  18   0438   BUN  6*  4*   CREATININE  0.56  0.52     Recent Labs      18   2145   VANCOTROUGH  12.1     Intake/Output Summary (Last 24 hours) at 18 0738  Last data filed at 18 1300   Gross per 24 hour   Intake              480 ml   Output                0 ml   Net              480 ml      Blood pressure 112/68, pulse 72, temperature 37.1 °C (98.8 °F), resp. rate 16, height 1.702 m (5' 7\"), weight 93.8 kg (206 lb 12.7 oz), SpO2 94 %, not currently breastfeeding. Temp (24hrs), Av.2 °C (98.9 °F), Min:36.4 °C (97.6 °F), Max:37.5 °C (99.5 °F)    Estimated Creatinine Clearance: 189.4 mL/min (by C-G formula based on SCr of 0.52 mg/dL).    A/P   1. Vancomycin dose change: not indicated   2. Next vancomycin level: 18 @0530  3. Goal trough: 18-22 mcg/mL  4. Comments: VS stable. Afebrile. WBC stable. Microbiology pending. CrCl ~189 mL/min. Recent level noted. Recent missed doses noted (discussed likely infusion related reaction with RN). Has tolerated dose this AM per report. Allergies updated. Will " repeat trough prior to AM dose 6/30/18 to ensure clearance. Pharmacy will follow and continue to adjust as appropriate.    Jason Mckeon, PharmD

## 2018-06-29 NOTE — CARE PLAN
Problem: Safety  Goal: Will remain free from injury  Patient educated on importance of using the call light if in need of assistance. Patient verbalizes understanding. Bed locked in lowest position, non skid socks on, personal belongings and call light within reach, appropriate sign placed on door.    Problem: Infection  Goal: Will remain free from infection  Patient and visitors instructed on preventative measures and isolation precautions.    Problem: Knowledge Deficit  Goal: Knowledge of disease process/condition, treatment plan, diagnostic tests, and medications will improve  Patient updated on POC. All questions answered at this time.

## 2018-06-30 LAB
ANION GAP SERPL CALC-SCNC: 7 MMOL/L (ref 0–11.9)
B-HCG SERPL-ACNC: 8972.6 MIU/ML (ref 0–5)
BASOPHILS # BLD AUTO: 0.5 % (ref 0–1.8)
BASOPHILS # BLD: 0.03 K/UL (ref 0–0.12)
BUN SERPL-MCNC: 5 MG/DL (ref 8–22)
CALCIUM SERPL-MCNC: 8.3 MG/DL (ref 8.5–10.5)
CHLORIDE SERPL-SCNC: 107 MMOL/L (ref 96–112)
CO2 SERPL-SCNC: 25 MMOL/L (ref 20–33)
CREAT SERPL-MCNC: 0.58 MG/DL (ref 0.5–1.4)
EOSINOPHIL # BLD AUTO: 0.14 K/UL (ref 0–0.51)
EOSINOPHIL NFR BLD: 2.2 % (ref 0–6.9)
ERYTHROCYTE [DISTWIDTH] IN BLOOD BY AUTOMATED COUNT: 46.6 FL (ref 35.9–50)
GLUCOSE SERPL-MCNC: 92 MG/DL (ref 65–99)
HCT VFR BLD AUTO: 35.2 % (ref 37–47)
HGB BLD-MCNC: 11.8 G/DL (ref 12–16)
IMM GRANULOCYTES # BLD AUTO: 0.01 K/UL (ref 0–0.11)
IMM GRANULOCYTES NFR BLD AUTO: 0.2 % (ref 0–0.9)
LYMPHOCYTES # BLD AUTO: 2.23 K/UL (ref 1–4.8)
LYMPHOCYTES NFR BLD: 34.4 % (ref 22–41)
MCH RBC QN AUTO: 29.3 PG (ref 27–33)
MCHC RBC AUTO-ENTMCNC: 33.5 G/DL (ref 33.6–35)
MCV RBC AUTO: 87.3 FL (ref 81.4–97.8)
MONOCYTES # BLD AUTO: 0.59 K/UL (ref 0–0.85)
MONOCYTES NFR BLD AUTO: 9.1 % (ref 0–13.4)
NEUTROPHILS # BLD AUTO: 3.48 K/UL (ref 2–7.15)
NEUTROPHILS NFR BLD: 53.6 % (ref 44–72)
NRBC # BLD AUTO: 0 K/UL
NRBC BLD-RTO: 0 /100 WBC
PLATELET # BLD AUTO: 208 K/UL (ref 164–446)
PMV BLD AUTO: 9.7 FL (ref 9–12.9)
POTASSIUM SERPL-SCNC: 3.7 MMOL/L (ref 3.6–5.5)
RBC # BLD AUTO: 4.03 M/UL (ref 4.2–5.4)
SODIUM SERPL-SCNC: 139 MMOL/L (ref 135–145)
WBC # BLD AUTO: 6.5 K/UL (ref 4.8–10.8)

## 2018-06-30 PROCEDURE — 36415 COLL VENOUS BLD VENIPUNCTURE: CPT

## 2018-06-30 PROCEDURE — 700105 HCHG RX REV CODE 258: Performed by: HOSPITALIST

## 2018-06-30 PROCEDURE — 80048 BASIC METABOLIC PNL TOTAL CA: CPT

## 2018-06-30 PROCEDURE — A9270 NON-COVERED ITEM OR SERVICE: HCPCS | Performed by: HOSPITALIST

## 2018-06-30 PROCEDURE — 700102 HCHG RX REV CODE 250 W/ 637 OVERRIDE(OP): Performed by: HOSPITALIST

## 2018-06-30 PROCEDURE — 700111 HCHG RX REV CODE 636 W/ 250 OVERRIDE (IP): Performed by: HOSPITALIST

## 2018-06-30 PROCEDURE — 770020 HCHG ROOM/CARE - TELE (206)

## 2018-06-30 PROCEDURE — 84702 CHORIONIC GONADOTROPIN TEST: CPT

## 2018-06-30 PROCEDURE — 99232 SBSQ HOSP IP/OBS MODERATE 35: CPT | Performed by: INTERNAL MEDICINE

## 2018-06-30 PROCEDURE — 99232 SBSQ HOSP IP/OBS MODERATE 35: CPT | Performed by: HOSPITALIST

## 2018-06-30 PROCEDURE — 85025 COMPLETE CBC W/AUTO DIFF WBC: CPT

## 2018-06-30 RX ADMIN — OXYCODONE HYDROCHLORIDE 5 MG: 5 TABLET ORAL at 23:38

## 2018-06-30 RX ADMIN — STANDARDIZED SENNA CONCENTRATE AND DOCUSATE SODIUM 2 TABLET: 8.6; 5 TABLET, FILM COATED ORAL at 06:17

## 2018-06-30 RX ADMIN — OXYCODONE HYDROCHLORIDE 5 MG: 5 TABLET ORAL at 08:05

## 2018-06-30 RX ADMIN — OXYCODONE HYDROCHLORIDE 5 MG: 5 TABLET ORAL at 13:40

## 2018-06-30 RX ADMIN — OXYCODONE HYDROCHLORIDE 5 MG: 5 TABLET ORAL at 04:15

## 2018-06-30 RX ADMIN — CEFTRIAXONE 2 G: 2 INJECTION, POWDER, FOR SOLUTION INTRAMUSCULAR; INTRAVENOUS at 06:17

## 2018-06-30 RX ADMIN — SODIUM CHLORIDE: 9 INJECTION, SOLUTION INTRAVENOUS at 08:05

## 2018-06-30 RX ADMIN — ENOXAPARIN SODIUM 40 MG: 100 INJECTION SUBCUTANEOUS at 06:18

## 2018-06-30 RX ADMIN — OXYCODONE HYDROCHLORIDE 5 MG: 5 TABLET ORAL at 18:31

## 2018-06-30 RX ADMIN — SODIUM CHLORIDE: 9 INJECTION, SOLUTION INTRAVENOUS at 17:37

## 2018-06-30 RX ADMIN — LIDOCAINE HYDROCHLORIDE 15 ML: 20 SOLUTION OROPHARYNGEAL at 23:38

## 2018-06-30 RX ADMIN — STANDARDIZED SENNA CONCENTRATE AND DOCUSATE SODIUM 2 TABLET: 8.6; 5 TABLET, FILM COATED ORAL at 17:38

## 2018-06-30 RX ADMIN — CEFTRIAXONE 2 G: 2 INJECTION, POWDER, FOR SOLUTION INTRAMUSCULAR; INTRAVENOUS at 17:37

## 2018-06-30 ASSESSMENT — PAIN SCALES - GENERAL
PAINLEVEL_OUTOF10: 4
PAINLEVEL_OUTOF10: 2
PAINLEVEL_OUTOF10: 4
PAINLEVEL_OUTOF10: 2
PAINLEVEL_OUTOF10: 4
PAINLEVEL_OUTOF10: 4

## 2018-06-30 ASSESSMENT — ENCOUNTER SYMPTOMS
HEADACHES: 1
CHILLS: 0
FOCAL WEAKNESS: 0
GASTROINTESTINAL NEGATIVE: 1
ABDOMINAL PAIN: 0
MUSCULOSKELETAL NEGATIVE: 1
SENSORY CHANGE: 0
COUGH: 0
FEVER: 0
RESPIRATORY NEGATIVE: 1
CARDIOVASCULAR NEGATIVE: 1
SPEECH CHANGE: 0
NAUSEA: 0
CONSTITUTIONAL NEGATIVE: 1
VOMITING: 0
PSYCHIATRIC NEGATIVE: 1
SEIZURES: 0
EYES NEGATIVE: 1

## 2018-06-30 NOTE — PROGRESS NOTES
Infectious Disease Progress Note    Author: Raven Alvarez M.D. Date & Time of service: 2018  4:59 PM    Chief Complaint:  Meningitis.      Interval History:  28-year-old white female who is in the hospital with acute meningitis   AF ,WBC 5.9 HA much improved-denies SE abx today   AF sleepy-no acute events  Labs Reviewed, Medications Reviewed and Radiology Reviewed.    Review of Systems:  Review of Systems   Constitutional: Negative for chills and fever.   Respiratory: Negative for cough.    Cardiovascular: Negative for chest pain.   Gastrointestinal: Negative for abdominal pain, nausea and vomiting.   Genitourinary: Negative for dysuria.   Skin: Negative for itching and rash.   Neurological: Positive for headaches. Negative for sensory change, speech change, focal weakness and seizures.        Decreased       Hemodynamics:  Temp (24hrs), Av.1 °C (98.8 °F), Min:36.4 °C (97.5 °F), Max:37.9 °C (100.3 °F)  Temperature: 37.2 °C (99 °F)  Pulse  Av.4  Min: 60  Max: 79   Blood Pressure: 116/66       Physical Exam:  Physical Exam   Constitutional: No distress.   HENT:   Head: Normocephalic and atraumatic.   Cardiovascular: Normal rate.    Pulmonary/Chest: She has no wheezes. She has no rales.   Abdominal: Soft. There is no rebound and no guarding.   Musculoskeletal: She exhibits no edema.   Skin: No rash noted. No erythema.   Nursing note and vitals reviewed.      Meds:    Current Facility-Administered Medications:   •  hyoscyamine-maalox plus-lidocaine viscous  •  NS  •  enoxaparin  •  ondansetron  •  ondansetron  •  promethazine  •  promethazine  •  prochlorperazine  •  senna-docusate **AND** polyethylene glycol/lytes **AND** magnesium hydroxide **AND** bisacodyl  •  acetaminophen  •  Notify provider if pain remains uncontrolled **AND** Use the numeric rating scale (NRS-11) on regular floors and Critical-Care Pain Observation Tool (CPOT) on ICUs/Trauma to assess pain **AND** Pulse Ox (Oximetry)  **AND** Pharmacy Consult Request **AND** If patient difficult to arouse and/or has respiratory depression, stop any opiates that are currently infusing and call a Rapid Response. **AND** oxyCODONE immediate-release **AND** oxyCODONE immediate-release **AND** morphine injection  •  cefTRIAXone (ROCEPHIN) IV    Labs:  Recent Labs      06/28/18   1605  06/29/18   0438  06/30/18   0326   WBC  6.2  5.9  6.5   RBC  3.98*  3.98*  4.03*   HEMOGLOBIN  11.7*  11.5*  11.8*   HEMATOCRIT  34.7*  35.5*  35.2*   MCV  87.2  89.2  87.3   MCH  29.4  28.9  29.3   RDW  47.5  48.1  46.6   PLATELETCT  202  203  208   MPV  9.6  10.1  9.7   NEUTSPOLYS  49.80  46.80  53.60   LYMPHOCYTES  37.10  41.20*  34.40   MONOCYTES  9.90  9.20  9.10   EOSINOPHILS  2.60  1.70  2.20   BASOPHILS  0.30  0.90  0.50     Recent Labs      06/28/18   1605  06/29/18   0438  06/30/18   0326   SODIUM  141  138  139   POTASSIUM  3.2*  3.6  3.7   CHLORIDE  109  108  107   CO2  24  22  25   GLUCOSE  94  85  92   BUN  6*  4*  5*     Recent Labs      06/28/18   1605  06/29/18   0438  06/30/18   0326   CREATININE  0.56  0.52  0.58       Imaging:  No results found.    Micro:  Results     ** No results found for the last 168 hours. **          Assessment:  Active Hospital Problems    Diagnosis   • *Meningitis [G03.9]   • Fetal demise before 20 weeks with retention of dead fetus [O02.1]   • Class 1 obesity due to excess calories without serious comorbidity with body mass index (BMI) of 32.0 to 32.9 in adult [E66.09, Z68.32]   • Migraine without aura and without status migrainosus, not intractable [G43.009]       Plan:  Meningitis.  Low grade fevers resolved  No leukocytosis  Await cx from [facility]  Continue ceftriaxone for now  dc isolation    Fetal demise   Beta hCG decreasing  Continue to monitor   for complications related to retained products of conception

## 2018-06-30 NOTE — PROGRESS NOTES
"[facility] Progress Note    Date of Service: 2018    Chief Complaint  28 y.o. female admitted 2018 with meningitis    Interval Problem Update  Mild headache this am 4/10, no photophobia, no neck pain or stiffness, no n/v  She does report some \"indigestion\"  No abdominal pain, cramping or spotting    Consultants/Specialty  ID    Disposition          Review of Systems   Constitutional: Negative.    HENT: Negative.    Eyes: Negative.    Respiratory: Negative.    Cardiovascular: Negative.    Gastrointestinal: Negative.    Genitourinary: Negative.    Musculoskeletal: Negative.    Skin: Negative.    Psychiatric/Behavioral: Negative.       Physical Exam  Laboratory/Imaging   Hemodynamics  Temp (24hrs), Av.1 °C (98.7 °F), Min:36.4 °C (97.5 °F), Max:37.9 °C (100.3 °F)   Temperature: 36.7 °C (98 °F)  Pulse  Av.3  Min: 60  Max: 79    Blood Pressure: 118/64      Respiratory      Respiration: 16, Pulse Oximetry: 95 %        RUL Breath Sounds: Clear, RML Breath Sounds: Clear, RLL Breath Sounds: Clear, CAS Breath Sounds: Clear, LLL Breath Sounds: Clear    Fluids    Intake/Output Summary (Last 24 hours) at 18 1116  Last data filed at 18 0005   Gross per 24 hour   Intake             4246 ml   Output                0 ml   Net             4246 ml       Nutrition  Orders Placed This Encounter   Procedures   • Diet Order Regular     Standing Status:   Standing     Number of Occurrences:   1     Order Specific Question:   Diet:     Answer:   Regular [1]     Physical Exam   Constitutional: She is oriented to person, place, and time. She appears well-developed and well-nourished. No distress.   HENT:   Head: Normocephalic and atraumatic.   Mouth/Throat: Oropharynx is clear and moist.   Eyes: Conjunctivae are normal.   Neck: Normal range of motion. Neck supple.   Cardiovascular: Normal rate, normal heart sounds and intact distal pulses.  Exam reveals no gallop and no friction rub.    No murmur " heard.  Pulmonary/Chest: Effort normal and breath sounds normal. No respiratory distress. She has no wheezes. She has no rales. She exhibits no tenderness.   Abdominal: Soft. Bowel sounds are normal. She exhibits no distension and no mass. There is no tenderness. There is no rebound and no guarding.   Musculoskeletal: Normal range of motion. She exhibits no edema or tenderness.   Neurological: She is alert and oriented to person, place, and time. She has normal reflexes. No cranial nerve deficit. She exhibits normal muscle tone. Coordination normal.   Skin: Skin is warm and dry. No rash noted. She is not diaphoretic. No erythema. No pallor.   Psychiatric: She has a normal mood and affect. Her behavior is normal. Judgment and thought content normal.   Nursing note and vitals reviewed.      Recent Labs      06/28/18   1605  06/29/18   0438  06/30/18   0326   WBC  6.2  5.9  6.5   RBC  3.98*  3.98*  4.03*   HEMOGLOBIN  11.7*  11.5*  11.8*   HEMATOCRIT  34.7*  35.5*  35.2*   MCV  87.2  89.2  87.3   MCH  29.4  28.9  29.3   MCHC  33.7  32.4*  33.5*   RDW  47.5  48.1  46.6   PLATELETCT  202  203  208   MPV  9.6  10.1  9.7     Recent Labs      06/28/18   1605  06/29/18   0438  06/30/18   0326   SODIUM  141  138  139   POTASSIUM  3.2*  3.6  3.7   CHLORIDE  109  108  107   CO2  24  22  25   GLUCOSE  94  85  92   BUN  6*  4*  5*   CREATININE  0.56  0.52  0.58   CALCIUM  7.6*  8.1*  8.3*                      Assessment/Plan     * Meningitis   Assessment & Plan    Suspected bacterial meningitis  LP cultures from [facility]  Still pending  Remains on iv ceftriaxone  ID following   Clinically improving  Continue to follow   Continue supportive care        Class 1 obesity due to excess calories without serious comorbidity with body mass index (BMI) of 32.0 to 32.9 in adult   Assessment & Plan    Body mass index is 32.01 kg/m².          Fetal demise before 20 weeks with retention of dead fetus   Assessment & Plan    Pt has not passed  remains  following          Quality-Core Measures

## 2018-07-01 LAB
BASOPHILS # BLD AUTO: 0.6 % (ref 0–1.8)
BASOPHILS # BLD: 0.04 K/UL (ref 0–0.12)
EOSINOPHIL # BLD AUTO: 0.18 K/UL (ref 0–0.51)
EOSINOPHIL NFR BLD: 2.7 % (ref 0–6.9)
ERYTHROCYTE [DISTWIDTH] IN BLOOD BY AUTOMATED COUNT: 46.9 FL (ref 35.9–50)
HCT VFR BLD AUTO: 35.9 % (ref 37–47)
HGB BLD-MCNC: 11.9 G/DL (ref 12–16)
IMM GRANULOCYTES # BLD AUTO: 0.01 K/UL (ref 0–0.11)
IMM GRANULOCYTES NFR BLD AUTO: 0.1 % (ref 0–0.9)
LYMPHOCYTES # BLD AUTO: 2.87 K/UL (ref 1–4.8)
LYMPHOCYTES NFR BLD: 42.3 % (ref 22–41)
MCH RBC QN AUTO: 28.9 PG (ref 27–33)
MCHC RBC AUTO-ENTMCNC: 33.1 G/DL (ref 33.6–35)
MCV RBC AUTO: 87.1 FL (ref 81.4–97.8)
MONOCYTES # BLD AUTO: 0.52 K/UL (ref 0–0.85)
MONOCYTES NFR BLD AUTO: 7.7 % (ref 0–13.4)
NEUTROPHILS # BLD AUTO: 3.16 K/UL (ref 2–7.15)
NEUTROPHILS NFR BLD: 46.6 % (ref 44–72)
NRBC # BLD AUTO: 0 K/UL
NRBC BLD-RTO: 0 /100 WBC
PLATELET # BLD AUTO: 221 K/UL (ref 164–446)
PMV BLD AUTO: 10 FL (ref 9–12.9)
RBC # BLD AUTO: 4.12 M/UL (ref 4.2–5.4)
WBC # BLD AUTO: 6.8 K/UL (ref 4.8–10.8)

## 2018-07-01 PROCEDURE — 700111 HCHG RX REV CODE 636 W/ 250 OVERRIDE (IP): Performed by: HOSPITALIST

## 2018-07-01 PROCEDURE — 700102 HCHG RX REV CODE 250 W/ 637 OVERRIDE(OP): Performed by: HOSPITALIST

## 2018-07-01 PROCEDURE — A9270 NON-COVERED ITEM OR SERVICE: HCPCS | Performed by: HOSPITALIST

## 2018-07-01 PROCEDURE — 700105 HCHG RX REV CODE 258: Performed by: HOSPITALIST

## 2018-07-01 PROCEDURE — 99232 SBSQ HOSP IP/OBS MODERATE 35: CPT | Performed by: INTERNAL MEDICINE

## 2018-07-01 PROCEDURE — 85025 COMPLETE CBC W/AUTO DIFF WBC: CPT

## 2018-07-01 PROCEDURE — 36415 COLL VENOUS BLD VENIPUNCTURE: CPT

## 2018-07-01 PROCEDURE — 99232 SBSQ HOSP IP/OBS MODERATE 35: CPT | Performed by: HOSPITALIST

## 2018-07-01 PROCEDURE — 770020 HCHG ROOM/CARE - TELE (206)

## 2018-07-01 RX ORDER — CALCIUM CARBONATE 500 MG/1
500 TABLET, CHEWABLE ORAL EVERY 8 HOURS PRN
Status: DISCONTINUED | OUTPATIENT
Start: 2018-07-01 | End: 2018-07-02 | Stop reason: HOSPADM

## 2018-07-01 RX ADMIN — CEFTRIAXONE 2 G: 2 INJECTION, POWDER, FOR SOLUTION INTRAMUSCULAR; INTRAVENOUS at 17:36

## 2018-07-01 RX ADMIN — STANDARDIZED SENNA CONCENTRATE AND DOCUSATE SODIUM 2 TABLET: 8.6; 5 TABLET, FILM COATED ORAL at 05:12

## 2018-07-01 RX ADMIN — ENOXAPARIN SODIUM 40 MG: 100 INJECTION SUBCUTANEOUS at 05:11

## 2018-07-01 RX ADMIN — LIDOCAINE HYDROCHLORIDE 15 ML: 20 SOLUTION OROPHARYNGEAL at 21:28

## 2018-07-01 RX ADMIN — OXYCODONE HYDROCHLORIDE 5 MG: 5 TABLET ORAL at 08:14

## 2018-07-01 RX ADMIN — ANTACID TABLETS 500 MG: 500 TABLET, CHEWABLE ORAL at 15:27

## 2018-07-01 RX ADMIN — ACETAMINOPHEN 650 MG: 325 TABLET, FILM COATED ORAL at 13:37

## 2018-07-01 RX ADMIN — SODIUM CHLORIDE: 9 INJECTION, SOLUTION INTRAVENOUS at 08:14

## 2018-07-01 RX ADMIN — STANDARDIZED SENNA CONCENTRATE AND DOCUSATE SODIUM 2 TABLET: 8.6; 5 TABLET, FILM COATED ORAL at 17:39

## 2018-07-01 RX ADMIN — CEFTRIAXONE 2 G: 2 INJECTION, POWDER, FOR SOLUTION INTRAMUSCULAR; INTRAVENOUS at 05:11

## 2018-07-01 ASSESSMENT — PAIN SCALES - GENERAL
PAINLEVEL_OUTOF10: 1
PAINLEVEL_OUTOF10: 0
PAINLEVEL_OUTOF10: 4
PAINLEVEL_OUTOF10: 3
PAINLEVEL_OUTOF10: 2
PAINLEVEL_OUTOF10: 2

## 2018-07-01 ASSESSMENT — ENCOUNTER SYMPTOMS
SPEECH CHANGE: 0
HEADACHES: 1
CARDIOVASCULAR NEGATIVE: 1
GASTROINTESTINAL NEGATIVE: 1
MUSCULOSKELETAL NEGATIVE: 1
VOMITING: 0
PSYCHIATRIC NEGATIVE: 1
FOCAL WEAKNESS: 0
SENSORY CHANGE: 0
SEIZURES: 0
COUGH: 0
RESPIRATORY NEGATIVE: 1
ABDOMINAL PAIN: 0
CHILLS: 0
NAUSEA: 0
CONSTITUTIONAL NEGATIVE: 1
EYES NEGATIVE: 1
FEVER: 0

## 2018-07-01 NOTE — PROGRESS NOTES
Infectious Disease Progress Note    Author: Raven Alvarez M.D. Date & Time of service: 2018  4:22 PM    Chief Complaint:  Meningitis.      Interval History:  28-year-old white female who is in the hospital with acute meningitis   AF ,WBC 5.9 HA much improved-denies SE abx today   AF sleepy-no acute events   AF WBC 6.8 minimal HA -about 1-2/10 denies SE abx  Labs Reviewed, Medications Reviewed and Radiology Reviewed.    Review of Systems:  Review of Systems   Constitutional: Negative for chills and fever.   Respiratory: Negative for cough.    Cardiovascular: Negative for chest pain.   Gastrointestinal: Negative for abdominal pain, nausea and vomiting.   Genitourinary: Negative for dysuria.   Skin: Negative for itching and rash.   Neurological: Positive for headaches. Negative for sensory change, speech change, focal weakness and seizures.        Decreased       Hemodynamics:  Temp (24hrs), Av.8 °C (98.3 °F), Min:36.6 °C (97.9 °F), Max:37.2 °C (98.9 °F)  Temperature: 36.8 °C (98.3 °F)  Pulse  Av.3  Min: 60  Max: 79   Blood Pressure: 122/65       Physical Exam:  Physical Exam   Constitutional: She is oriented to person, place, and time. She appears well-nourished. No distress.   HENT:   Head: Normocephalic and atraumatic.   Eyes: EOM are normal.   Cardiovascular: Normal rate and normal heart sounds.    Pulmonary/Chest: Effort normal. No respiratory distress. She has no wheezes. She has no rales.   Abdominal: Soft. She exhibits no distension. There is no tenderness. There is no rebound and no guarding.   Musculoskeletal: She exhibits no edema.   Neurological: She is alert and oriented to person, place, and time. No cranial nerve deficit.   Skin: Skin is warm. No rash noted. No erythema.   Nursing note and vitals reviewed.      Meds:    Current Facility-Administered Medications:   •  calcium carbonate  •  hyoscyamine-maalox plus-lidocaine viscous  •  enoxaparin  •  ondansetron  •   ondansetron  •  promethazine  •  promethazine  •  prochlorperazine  •  senna-docusate **AND** polyethylene glycol/lytes **AND** magnesium hydroxide **AND** bisacodyl  •  acetaminophen  •  Notify provider if pain remains uncontrolled **AND** Use the numeric rating scale (NRS-11) on regular floors and Critical-Care Pain Observation Tool (CPOT) on ICUs/Trauma to assess pain **AND** Pulse Ox (Oximetry) **AND** Pharmacy Consult Request **AND** If patient difficult to arouse and/or has respiratory depression, stop any opiates that are currently infusing and call a Rapid Response. **AND** oxyCODONE immediate-release **AND** oxyCODONE immediate-release **AND** morphine injection  •  cefTRIAXone (ROCEPHIN) IV    Labs:  Recent Labs      06/29/18 0438  06/30/18 0326  07/01/18   0326   WBC  5.9  6.5  6.8   RBC  3.98*  4.03*  4.12*   HEMOGLOBIN  11.5*  11.8*  11.9*   HEMATOCRIT  35.5*  35.2*  35.9*   MCV  89.2  87.3  87.1   MCH  28.9  29.3  28.9   RDW  48.1  46.6  46.9   PLATELETCT  203  208  221   MPV  10.1  9.7  10.0   NEUTSPOLYS  46.80  53.60  46.60   LYMPHOCYTES  41.20*  34.40  42.30*   MONOCYTES  9.20  9.10  7.70   EOSINOPHILS  1.70  2.20  2.70   BASOPHILS  0.90  0.50  0.60     Recent Labs      06/29/18 0438  06/30/18   0326   SODIUM  138  139   POTASSIUM  3.6  3.7   CHLORIDE  108  107   CO2  22  25   GLUCOSE  85  92   BUN  4*  5*     Recent Labs      06/29/18 0438  06/30/18   0326   CREATININE  0.52  0.58       Imaging:  No results found.    Micro:  Results     ** No results found for the last 168 hours. **          Assessment:  Active Hospital Problems    Diagnosis   • *Meningitis [G03.9]   • Fetal demise before 20 weeks with retention of dead fetus [O02.1]   • Class 1 obesity due to excess calories without serious comorbidity with body mass index (BMI) of 32.0 to 32.9 in adult [E66.09, Z68.32]   • Migraine without aura and without status migrainosus, not intractable [G43.009]       Plan:  Meningitis.  Low grade  fevers resolved  No leukocytosis  Still waiting for [facility] cx [facility]-  Continue ceftriaxone for now    Fetal demise   Beta hCG decreasing  Continue to monitor   for complications related to retained products of conception

## 2018-07-01 NOTE — PROGRESS NOTES
[facility] [place]    Date of Service: 2018    Chief Complaint  28 y.o. female admitted 2018 with meningitis    Interval Problem Update  No further \"indigestion\"  Mild h/a 2/10  No n/v  Ros otherwise negative    Consultants/Specialty  ID    Disposition          Review of Systems   Constitutional: Negative.    HENT: Negative.    Eyes: Negative.    Respiratory: Negative.    Cardiovascular: Negative.    Gastrointestinal: Negative.    Genitourinary: Negative.    Musculoskeletal: Negative.    Skin: Negative.    Psychiatric/Behavioral: Negative.       Physical Exam  Laboratory/Imaging   Hemodynamics  Temp (24hrs), Av.8 °C (98.3 °F), Min:36.6 °C (97.9 °F), Max:37.2 °C (98.9 °F)   Temperature: 36.6 °C (97.9 °F)  Pulse  Av.2  Min: 60  Max: 79    Blood Pressure: 128/68      Respiratory      Respiration: 16, Pulse Oximetry: 97 %        RUL Breath Sounds: Clear, RML Breath Sounds: Clear, RLL Breath Sounds: Clear, CAS Breath Sounds: Clear, LLL Breath Sounds: Clear    Fluids  No intake or output data in the 24 hours ending 18 1334    Nutrition  Orders Placed This Encounter   Procedures   • Diet Order Regular     Standing Status:   Standing     Number of Occurrences:   1     Order Specific Question:   Diet:     Answer:   Regular [1]     Physical Exam   Constitutional: She is oriented to person, place, and time. She appears well-developed and well-nourished. No distress.   HENT:   Head: Normocephalic and atraumatic.   Mouth/Throat: Oropharynx is clear and moist.   Eyes: Conjunctivae are normal.   Neck: Normal range of motion. Neck supple.   Cardiovascular: Normal rate, normal heart sounds and intact distal pulses.  Exam reveals no gallop and no friction rub.    No murmur heard.  Pulmonary/Chest: Effort normal and breath sounds normal. No respiratory distress. She has no wheezes. She has no rales. She exhibits no tenderness.   Abdominal: Soft. Bowel sounds are normal. She exhibits no distension " and no mass. There is no tenderness. There is no rebound and no guarding.   Musculoskeletal: Normal range of motion. She exhibits no edema or tenderness.   Neurological: She is alert and oriented to person, place, and time. She has normal reflexes. No cranial nerve deficit. She exhibits normal muscle tone. Coordination normal.   Skin: Skin is warm and dry. No rash noted. She is not diaphoretic. No erythema. No pallor.   Psychiatric: She has a normal mood and affect. Her behavior is normal. Judgment and thought content normal.   Nursing note and vitals reviewed.      Recent Labs      06/29/18   0438  06/30/18   0326  07/01/18   0326   WBC  5.9  6.5  6.8   RBC  3.98*  4.03*  4.12*   HEMOGLOBIN  11.5*  11.8*  11.9*   HEMATOCRIT  35.5*  35.2*  35.9*   MCV  89.2  87.3  87.1   MCH  28.9  29.3  28.9   MCHC  32.4*  33.5*  33.1*   RDW  48.1  46.6  46.9   PLATELETCT  203  208  221   MPV  10.1  9.7  10.0     Recent Labs      06/28/18   1605  06/29/18   0438  06/30/18   0326   SODIUM  141  138  139   POTASSIUM  3.2*  3.6  3.7   CHLORIDE  109  108  107   CO2  24  22  25   GLUCOSE  94  85  92   BUN  6*  4*  5*   CREATININE  0.56  0.52  0.58   CALCIUM  7.6*  8.1*  8.3*                      Assessment/Plan     * Meningitis   Assessment & Plan    Suspected bacterial meningitis  Awaiting final culture results from LP done at [facility]  Remains on iv ceftriaxone  ID following   Clinically improving  Continue to follow   Continue supportive care        Class 1 obesity due to excess calories without serious comorbidity with body mass index (BMI) of 32.0 to 32.9 in adult   Assessment & Plan    Body mass index is 32.01 kg/m².          Fetal demise before 20 weeks with retention of dead fetus   Assessment & Plan    Patient still has not passed remains, no cramping or spotting  bhcg decreasing  Plans to follow up outpatient with her ob          Quality-Core Measures

## 2018-07-01 NOTE — CARE PLAN
Problem: Safety  Goal: Will remain free from injury  Patient educated on importance of using the call light if in need of assistance. Patient verbalizes understanding. Bed locked in lowest position, non skid socks on, personal belongings and call light within reach, appropriate sign placed on door.    Problem: Knowledge Deficit  Goal: Knowledge of disease process/condition, treatment plan, diagnostic tests, and medications will improve  Patient updated on POC. All questions answered at this time.    Problem: Pain Management  Goal: Pain level will decrease to patient's comfort goal  Patient still with HA, managed with current regimen.

## 2018-07-01 NOTE — PROGRESS NOTES
Received report from day shift RN. Pt continues to have H/A and has order for GI cocktail if GERD reappears. Stable with spouse present.

## 2018-07-02 VITALS
WEIGHT: 204.59 LBS | RESPIRATION RATE: 18 BRPM | BODY MASS INDEX: 32.11 KG/M2 | DIASTOLIC BLOOD PRESSURE: 54 MMHG | SYSTOLIC BLOOD PRESSURE: 106 MMHG | HEIGHT: 67 IN | HEART RATE: 84 BPM | TEMPERATURE: 98.6 F | OXYGEN SATURATION: 97 %

## 2018-07-02 PROBLEM — G43.009 MIGRAINE WITHOUT AURA AND WITHOUT STATUS MIGRAINOSUS, NOT INTRACTABLE: Status: RESOLVED | Noted: 2018-06-28 | Resolved: 2018-07-02

## 2018-07-02 LAB
BASOPHILS # BLD AUTO: 1 % (ref 0–1.8)
BASOPHILS # BLD: 0.06 K/UL (ref 0–0.12)
EOSINOPHIL # BLD AUTO: 0.17 K/UL (ref 0–0.51)
EOSINOPHIL NFR BLD: 2.7 % (ref 0–6.9)
ERYTHROCYTE [DISTWIDTH] IN BLOOD BY AUTOMATED COUNT: 46.9 FL (ref 35.9–50)
HCT VFR BLD AUTO: 36.1 % (ref 37–47)
HGB BLD-MCNC: 11.9 G/DL (ref 12–16)
HIV 1+2 AB+HIV1 P24 AG SERPL QL IA: NON REACTIVE
IMM GRANULOCYTES # BLD AUTO: 0.03 K/UL (ref 0–0.11)
IMM GRANULOCYTES NFR BLD AUTO: 0.5 % (ref 0–0.9)
LYMPHOCYTES # BLD AUTO: 2.74 K/UL (ref 1–4.8)
LYMPHOCYTES NFR BLD: 43.4 % (ref 22–41)
MCH RBC QN AUTO: 29 PG (ref 27–33)
MCHC RBC AUTO-ENTMCNC: 33 G/DL (ref 33.6–35)
MCV RBC AUTO: 87.8 FL (ref 81.4–97.8)
MONOCYTES # BLD AUTO: 0.55 K/UL (ref 0–0.85)
MONOCYTES NFR BLD AUTO: 8.7 % (ref 0–13.4)
NEUTROPHILS # BLD AUTO: 2.76 K/UL (ref 2–7.15)
NEUTROPHILS NFR BLD: 43.7 % (ref 44–72)
NRBC # BLD AUTO: 0 K/UL
NRBC BLD-RTO: 0 /100 WBC
PLATELET # BLD AUTO: 236 K/UL (ref 164–446)
PMV BLD AUTO: 9.9 FL (ref 9–12.9)
RBC # BLD AUTO: 4.11 M/UL (ref 4.2–5.4)
WBC # BLD AUTO: 6.3 K/UL (ref 4.8–10.8)

## 2018-07-02 PROCEDURE — 87389 HIV-1 AG W/HIV-1&-2 AB AG IA: CPT

## 2018-07-02 PROCEDURE — 85025 COMPLETE CBC W/AUTO DIFF WBC: CPT

## 2018-07-02 PROCEDURE — 700111 HCHG RX REV CODE 636 W/ 250 OVERRIDE (IP): Performed by: HOSPITALIST

## 2018-07-02 PROCEDURE — A9270 NON-COVERED ITEM OR SERVICE: HCPCS | Performed by: HOSPITALIST

## 2018-07-02 PROCEDURE — 700105 HCHG RX REV CODE 258: Performed by: HOSPITALIST

## 2018-07-02 PROCEDURE — 99232 SBSQ HOSP IP/OBS MODERATE 35: CPT | Performed by: INTERNAL MEDICINE

## 2018-07-02 PROCEDURE — 99239 HOSP IP/OBS DSCHRG MGMT >30: CPT | Performed by: HOSPITALIST

## 2018-07-02 PROCEDURE — 700102 HCHG RX REV CODE 250 W/ 637 OVERRIDE(OP): Performed by: HOSPITALIST

## 2018-07-02 PROCEDURE — 36415 COLL VENOUS BLD VENIPUNCTURE: CPT

## 2018-07-02 RX ADMIN — ACETAMINOPHEN 650 MG: 325 TABLET, FILM COATED ORAL at 10:03

## 2018-07-02 RX ADMIN — CEFTRIAXONE 2 G: 2 INJECTION, POWDER, FOR SOLUTION INTRAMUSCULAR; INTRAVENOUS at 05:54

## 2018-07-02 RX ADMIN — ENOXAPARIN SODIUM 40 MG: 100 INJECTION SUBCUTANEOUS at 05:54

## 2018-07-02 RX ADMIN — STANDARDIZED SENNA CONCENTRATE AND DOCUSATE SODIUM 2 TABLET: 8.6; 5 TABLET, FILM COATED ORAL at 05:54

## 2018-07-02 ASSESSMENT — ENCOUNTER SYMPTOMS
PSYCHIATRIC NEGATIVE: 1
CARDIOVASCULAR NEGATIVE: 1
HEADACHES: 1
COUGH: 0
EYES NEGATIVE: 1
FEVER: 0
RESPIRATORY NEGATIVE: 1
VOMITING: 0
GASTROINTESTINAL NEGATIVE: 1
MUSCULOSKELETAL NEGATIVE: 1
HEADACHES: 0
CHILLS: 0
SPEECH CHANGE: 0
NAUSEA: 0
FOCAL WEAKNESS: 0
SEIZURES: 0
CONSTITUTIONAL NEGATIVE: 1
ABDOMINAL PAIN: 0
SENSORY CHANGE: 0

## 2018-07-02 ASSESSMENT — PAIN SCALES - GENERAL
PAINLEVEL_OUTOF10: 0
PAINLEVEL_OUTOF10: 3
PAINLEVEL_OUTOF10: 0

## 2018-07-02 NOTE — DISCHARGE SUMMARY
Discharge Summary    CHIEF COMPLAINT ON ADMISSION  No chief complaint on file.      Reason for Admission  Meningitis     Admission Date  6/28/2018    CODE STATUS  Full Code    HPI & HOSPITAL COURSE  This is a 28 y.o. female with history of chronic migraines and recent fetal demise for which she was seen at her OB office and who presented to [facility] with a headache and evidence of meningitis on lumbar puncture and transferred here for insurance reasons.    She was on empiric antivirals which were stopped after hsv csf tests were negative and iv rocephin until culture results were negative.  She was followed by ID an her antibiotics have now been stopped and her symptoms have resolved.  At this time ID suspects non HSV viral etiology.  A transvaginal US at [facility] showed, no fetal heart beat and bhcg has continued to decrease.  She has not passed products of conception yet.    Therefore, she is discharged in good and stable condition to home with close outpatient follow-up.    The patient met 2-midnight criteria for an inpatient stay at the time of discharge.    Discharge Date  7/2/18    FOLLOW UP ITEMS POST DISCHARGE  OB  PCP    DISCHARGE DIAGNOSES  Principal Problem:    Meningitis POA: Unknown  Active Problems:    Fetal demise before 20 weeks with retention of dead fetus POA: Unknown    Class 1 obesity due to excess calories without serious comorbidity with body mass index (BMI) of 32.0 to 32.9 in adult POA: Unknown  Resolved Problems:    Migraine without aura and without status migrainosus, not intractable POA: Unknown      FOLLOW UP  Future Appointments  Date Time Provider Department Center   7/13/2018 1:15 PM Kareem Osuna M.D. [facility]EN Young M.D.  [address]  [place] [address]  [phone]    In 1 week      Kareem Osuna M.D.  [address]  [place] [address] [phone]    In 1 week        MEDICATIONS ON DISCHARGE     Medication List      You have not been prescribed  any medications.         Allergies  Allergies   Allergen Reactions   • Radish [Raphanus Sativus] Hives   • Sulfa Drugs Hives   • Vancomycin Rash     Pt currently red and blotchy on anterior and posterior thoracic area from previous dose of Vanco. Tolerated vancomycin 6/29/18 without issue (suspect infusion related reaction).   • Other Food Hives     Radishes (Raphanus Sativus)       DIET  Orders Placed This Encounter   Procedures   • Diet Order Regular     Standing Status:   Standing     Number of Occurrences:   1     Order Specific Question:   Diet:     Answer:   Regular [1]       ACTIVITY  As tolerated.    CONSULTATIONS  ID    PROCEDURES  No orders to display         LABORATORY  Lab Results   Component Value Date    SODIUM 139 06/30/2018    POTASSIUM 3.7 06/30/2018    CHLORIDE 107 06/30/2018    CO2 25 06/30/2018    GLUCOSE 92 06/30/2018    BUN 5 (L) 06/30/2018    CREATININE 0.58 06/30/2018        Lab Results   Component Value Date    WBC 6.3 07/02/2018    HEMOGLOBIN 11.9 (L) 07/02/2018    HEMATOCRIT 36.1 (L) 07/02/2018    PLATELETCT 236 07/02/2018        Total time of the discharge process exceeds 45 minutes.

## 2018-07-02 NOTE — PROGRESS NOTES
Infectious Disease Progress Note    Author: Clare Cotter M.D. Date & Time of service: 2018  9:29 AM    Chief Complaint:  Meningitis.      Interval History:  28-year-old white female who is in the hospital with acute meningitis   AF ,WBC 5.9 HA much improved-denies SE abx today   AF sleepy-no acute events   AF WBC 6.8 minimal HA -about 1-2/10 denies SE abx  2018 MAXIMUM TEMPERATURE 99.1 WBC 6.3 platelets 236  Labs Reviewed, Medications Reviewed and Radiology Reviewed.    Review of Systems:  Review of Systems   Constitutional: Negative for chills and fever.   Respiratory: Negative for cough.    Cardiovascular: Negative for chest pain.   Gastrointestinal: Negative for abdominal pain, nausea and vomiting.   Genitourinary: Negative for dysuria.        Denies any vaginal bleeding   Skin: Negative for itching and rash.   Neurological: Negative for sensory change, speech change, focal weakness, seizures and headaches.        Her headache has resolved       Hemodynamics:  Temp (24hrs), Av.9 °C (98.5 °F), Min:36.6 °C (97.9 °F), Max:37.3 °C (99.1 °F)  Temperature: 37 °C (98.6 °F)  Pulse  Av.2  Min: 59  Max: 96   Blood Pressure: 106/54       Physical Exam:  Physical Exam   Constitutional: She is oriented to person, place, and time. She appears well-nourished. No distress.   HENT:   Head: Normocephalic and atraumatic.   Eyes: EOM are normal.   Cardiovascular: Normal rate and normal heart sounds.    Pulmonary/Chest: Effort normal. No respiratory distress. She has no wheezes. She has no rales.   Abdominal: Soft. She exhibits no distension. There is no tenderness. There is no rebound and no guarding.   Musculoskeletal: She exhibits no edema.   Neurological: She is alert and oriented to person, place, and time. No cranial nerve deficit.   Skin: Skin is warm. No rash noted. No erythema.   Nursing note and vitals reviewed.      Meds:    Current Facility-Administered Medications:   •  calcium  carbonate  •  hyoscyamine-maalox plus-lidocaine viscous  •  enoxaparin  •  ondansetron  •  ondansetron  •  promethazine  •  promethazine  •  prochlorperazine  •  senna-docusate **AND** polyethylene glycol/lytes **AND** magnesium hydroxide **AND** bisacodyl  •  acetaminophen  •  Notify provider if pain remains uncontrolled **AND** Use the numeric rating scale (NRS-11) on regular floors and Critical-Care Pain Observation Tool (CPOT) on ICUs/Trauma to assess pain **AND** Pulse Ox (Oximetry) **AND** Pharmacy Consult Request **AND** If patient difficult to arouse and/or has respiratory depression, stop any opiates that are currently infusing and call a Rapid Response. **AND** oxyCODONE immediate-release **AND** oxyCODONE immediate-release **AND** morphine injection  •  cefTRIAXone (ROCEPHIN) IV    Labs:  Recent Labs      06/30/18   0326  07/01/18   0326  07/02/18   0354   WBC  6.5  6.8  6.3   RBC  4.03*  4.12*  4.11*   HEMOGLOBIN  11.8*  11.9*  11.9*   HEMATOCRIT  35.2*  35.9*  36.1*   MCV  87.3  87.1  87.8   MCH  29.3  28.9  29.0   RDW  46.6  46.9  46.9   PLATELETCT  208  221  236   MPV  9.7  10.0  9.9   NEUTSPOLYS  53.60  46.60  43.70*   LYMPHOCYTES  34.40  42.30*  43.40*   MONOCYTES  9.10  7.70  8.70   EOSINOPHILS  2.20  2.70  2.70   BASOPHILS  0.50  0.60  1.00     Recent Labs      06/30/18   0326   SODIUM  139   POTASSIUM  3.7   CHLORIDE  107   CO2  25   GLUCOSE  92   BUN  5*     Recent Labs      06/30/18   0326   CREATININE  0.58       Imaging:  No results found.    Micro:  Results     ** No results found for the last 168 hours. **          Assessment:  Active Hospital Problems    Diagnosis   • *Meningitis [G03.9]   • Fetal demise before 20 weeks with retention of dead fetus [O02.1]   • Class 1 obesity due to excess calories without serious comorbidity with body mass index (BMI) of 32.0 to 32.9 in adult [E66.09, Z68.32]   • Migraine without aura and without status migrainosus, not intractable [G43.009]        Plan:  Meningitis.  Low grade fevers resolved  No leukocytosis  The CSF had lymphocytic predominance  The cultures from [place] are negative  Likely this is viral  Unclear whether enterovirus was sent  Discontinue Rocephin  Patient denies any contact with sick children  HIV testing    Fetal demise   Beta hCG decreasing  Continue to monitor   for complications related to retained products of conception  Follow-up with OB/GYN

## 2018-07-02 NOTE — PROGRESS NOTES
Infectious Disease Progress Note    Author: Clare Cotter M.D. Date & Time of service: 2018  8:25 AM    Chief Complaint:  Meningitis.      Interval History:  28-year-old white female who is in the hospital with acute meningitis   AF ,WBC 5.9 HA much improved-denies SE abx today   AF sleepy-no acute events   AF WBC 6.8 minimal HA -about 1-2/10 denies SE abx  2018 and MAXIMUM TEMPERATURE 99.1 WBC 6.3 platelets 236 creatinine 0.58  Labs Reviewed, Medications Reviewed and Radiology Reviewed.    Review of Systems:  Review of Systems   Constitutional: Negative for chills and fever.   Respiratory: Negative for cough.    Cardiovascular: Negative for chest pain.   Gastrointestinal: Negative for abdominal pain, nausea and vomiting.   Genitourinary: Negative for dysuria.   Skin: Negative for itching and rash.   Neurological: Positive for headaches. Negative for sensory change, speech change, focal weakness and seizures.        Decreased       Hemodynamics:  Temp (24hrs), Av.9 °C (98.5 °F), Min:36.6 °C (97.9 °F), Max:37.3 °C (99.1 °F)  Temperature: 37.1 °C (98.8 °F)  Pulse  Av.8  Min: 59  Max: 96   Blood Pressure: 112/61       Physical Exam:  Physical Exam   Constitutional: She is oriented to person, place, and time. She appears well-nourished. No distress.   HENT:   Head: Normocephalic and atraumatic.   Eyes: EOM are normal.   Cardiovascular: Normal rate and normal heart sounds.    Pulmonary/Chest: Effort normal. No respiratory distress. She has no wheezes. She has no rales.   Abdominal: Soft. She exhibits no distension. There is no tenderness. There is no rebound and no guarding.   Musculoskeletal: She exhibits no edema.   Neurological: She is alert and oriented to person, place, and time. No cranial nerve deficit.   Skin: Skin is warm. No rash noted. No erythema.   Nursing note and vitals reviewed.      Meds:    Current Facility-Administered Medications:   •  calcium carbonate  •   hyoscyamine-maalox plus-lidocaine viscous  •  enoxaparin  •  ondansetron  •  ondansetron  •  promethazine  •  promethazine  •  prochlorperazine  •  senna-docusate **AND** polyethylene glycol/lytes **AND** magnesium hydroxide **AND** bisacodyl  •  acetaminophen  •  Notify provider if pain remains uncontrolled **AND** Use the numeric rating scale (NRS-11) on regular floors and Critical-Care Pain Observation Tool (CPOT) on ICUs/Trauma to assess pain **AND** Pulse Ox (Oximetry) **AND** Pharmacy Consult Request **AND** If patient difficult to arouse and/or has respiratory depression, stop any opiates that are currently infusing and call a Rapid Response. **AND** oxyCODONE immediate-release **AND** oxyCODONE immediate-release **AND** morphine injection  •  cefTRIAXone (ROCEPHIN) IV    Labs:  Recent Labs      06/30/18   0326  07/01/18   0326  07/02/18   0354   WBC  6.5  6.8  6.3   RBC  4.03*  4.12*  4.11*   HEMOGLOBIN  11.8*  11.9*  11.9*   HEMATOCRIT  35.2*  35.9*  36.1*   MCV  87.3  87.1  87.8   MCH  29.3  28.9  29.0   RDW  46.6  46.9  46.9   PLATELETCT  208  221  236   MPV  9.7  10.0  9.9   NEUTSPOLYS  53.60  46.60  43.70*   LYMPHOCYTES  34.40  42.30*  43.40*   MONOCYTES  9.10  7.70  8.70   EOSINOPHILS  2.20  2.70  2.70   BASOPHILS  0.50  0.60  1.00     Recent Labs      06/30/18   0326   SODIUM  139   POTASSIUM  3.7   CHLORIDE  107   CO2  25   GLUCOSE  92   BUN  5*     Recent Labs      06/30/18   0326   CREATININE  0.58       Imaging:  No results found.    Micro:  Results     ** No results found for the last 168 hours. **          Assessment:  Active Hospital Problems    Diagnosis   • *Meningitis [G03.9]   • Fetal demise before 20 weeks with retention of dead fetus [O02.1]   • Class 1 obesity due to excess calories without serious comorbidity with body mass index (BMI) of 32.0 to 32.9 in adult [E66.09, Z68.32]   • Migraine without aura and without status migrainosus, not intractable [G43.009]        Plan:  Meningitis.  Low grade fevers resolved  No leukocytosis  Still waiting for CSF cx from [facility]-  Continue ceftriaxone for now    Fetal demise   Beta hCG decreasing  Continue to monitor   for complications related to retained products of conception

## 2018-07-02 NOTE — PROGRESS NOTES
[facility] Hospitalist Progress Note    Date of Service: 2018    Chief Complaint  28 y.o. female admitted 2018 with meningitis    Interval Problem Update  Indigestion overnight - chronic issue, none today  Dietary and positional eating recommendations discussed  No further h/a  No n/v  No abdominal cramping, no spotting    Consultants/Specialty  ID    Disposition          Review of Systems   Constitutional: Negative.    HENT: Negative.    Eyes: Negative.    Respiratory: Negative.    Cardiovascular: Negative.    Gastrointestinal: Negative.    Genitourinary: Negative.    Musculoskeletal: Negative.    Skin: Negative.    Psychiatric/Behavioral: Negative.       Physical Exam  Laboratory/Imaging   Hemodynamics  Temp (24hrs), Av.9 °C (98.5 °F), Min:36.6 °C (97.9 °F), Max:37.3 °C (99.1 °F)   Temperature: 37.1 °C (98.8 °F)  Pulse  Av.8  Min: 59  Max: 96    Blood Pressure: 112/61      Respiratory      Respiration: 18, Pulse Oximetry: 96 %        RUL Breath Sounds: (P) Clear, RML Breath Sounds: (P) Clear, RLL Breath Sounds: (P) Clear, CAS Breath Sounds: (P) Clear, LLL Breath Sounds: (P) Clear    Fluids  No intake or output data in the 24 hours ending 18 0825    Nutrition  Orders Placed This Encounter   Procedures   • Diet Order Regular     Standing Status:   Standing     Number of Occurrences:   1     Order Specific Question:   Diet:     Answer:   Regular [1]     Physical Exam   Constitutional: She is oriented to person, place, and time. She appears well-developed and well-nourished. No distress.   HENT:   Head: Normocephalic and atraumatic.   Mouth/Throat: Oropharynx is clear and moist.   Eyes: Conjunctivae are normal.   Neck: Normal range of motion. Neck supple.   Cardiovascular: Normal rate, normal heart sounds and intact distal pulses.  Exam reveals no gallop and no friction rub.    No murmur heard.  Pulmonary/Chest: Effort normal and breath sounds normal. No respiratory distress. She has no wheezes.  She has no rales. She exhibits no tenderness.   Abdominal: Soft. Bowel sounds are normal. She exhibits no distension and no mass. There is no tenderness. There is no rebound and no guarding.   Musculoskeletal: Normal range of motion. She exhibits no edema or tenderness.   Neurological: She is alert and oriented to person, place, and time. She has normal reflexes. No cranial nerve deficit. She exhibits normal muscle tone. Coordination normal.   Skin: Skin is warm and dry. No rash noted. She is not diaphoretic. No erythema. No pallor.   Psychiatric: She has a normal mood and affect. Her behavior is normal. Judgment and thought content normal.   Nursing note and vitals reviewed.      Recent Labs      06/30/18   0326  07/01/18   0326  07/02/18   0354   WBC  6.5  6.8  6.3   RBC  4.03*  4.12*  4.11*   HEMOGLOBIN  11.8*  11.9*  11.9*   HEMATOCRIT  35.2*  35.9*  36.1*   MCV  87.3  87.1  87.8   MCH  29.3  28.9  29.0   MCHC  33.5*  33.1*  33.0*   RDW  46.6  46.9  46.9   PLATELETCT  208  221  236   MPV  9.7  10.0  9.9     Recent Labs      06/30/18   0326   SODIUM  139   POTASSIUM  3.7   CHLORIDE  107   CO2  25   GLUCOSE  92   BUN  5*   CREATININE  0.58   CALCIUM  8.3*                      Assessment/Plan     * Meningitis   Assessment & Plan    Suspected bacterial meningitis  Awaiting final culture results from LP done at [facility]  Remains on iv ceftriaxone  ID following   Clinically improving  Continue to follow   Continue supportive care        Class 1 obesity due to excess calories without serious comorbidity with body mass index (BMI) of 32.0 to 32.9 in adult   Assessment & Plan    Body mass index is 32.01 kg/m².          Fetal demise before 20 weeks with retention of dead fetus   Assessment & Plan    Patient still has not passed remains, no cramping or spotting  bhcg decreasing  Plans to follow up outpatient with her ob          Quality-Core Measures

## 2018-07-02 NOTE — PROGRESS NOTES
Pt was discharged to home with relative. Lines removed. Vital signs stable. Tele box removed. Dicharge instructions reviewed and understood. All questions answered. All personal possessions with patient.

## 2018-07-02 NOTE — DISCHARGE INSTRUCTIONS
Discharge Instructions    Discharged to home by car with relative. Discharged via wheelchair, hospital escort: Refused.  Special equipment needed: Not Applicable    Be sure to schedule a follow-up appointment with your primary care doctor or any specialists as instructed.     Discharge Plan:   Diet Plan: (P) Discussed  Activity Level: (P) Discussed  Confirmed Follow up Appointment: (P) Appointment Scheduled  Confirmed Symptoms Management: (P) Discussed  Medication Reconciliation Updated: (P) Yes  Influenza Vaccine Indication: Not indicated: Previously immunized this influenza season and > 8 years of age    I understand that a diet low in cholesterol, fat, and sodium is recommended for good health. Unless I have been given specific instructions below for another diet, I accept this instruction as my diet prescription.     Special Instructions: None    · Is patient discharged on Warfarin / Coumadin?   No           Viral Meningitis, Adult  Viral meningitis is an infection of the tissues (meninges) that cover the brain and spinal cord. Many common viruses can cause viral meningitis.  Most people with viral meningitis get better without treatment in about 10 days. However, it is important to be evaluated by your health care provider to make sure you do not have bacterial meningitis. Bacterial meningitis has similar symptoms, but it is much more dangerous and must be treated quickly with antibiotics.  What are the causes?  Many common viruses can cause viral meningitis, including:  · Enteroviruses. These types of viruses are the most common cause of viral meningitis.  · Herpes.  · HIV (human immunodeficiency virus).  · Measles.  · Mumps.  · Chicken pox (varicella-zoster).  · Flu (influenza) viruses.  These viruses can be spread in different ways, such as through contact with:  · Stool. This means that you could get sick by touching something that has been contaminated with infected stool and then touching your eyes, nose,  or mouth.  · Respiratory secretions. This means that you could get sick from coughs or sneezes of an infected person, similar to the spreading of the common cold.  · Infected blood or infected bodily fluids.  · Rodents.  · Mosquito bites or tick bites.  When a virus enters your system, it can spread through the blood to reach the brain and spinal cord.  What increases the risk?  You may be at higher risk for meningitis if you have a weakened disease-fighting system (immune system).  What are the signs or symptoms?  Symptoms of viral meningitis may be similar to symptoms of a cold or flu. Signs and symptoms may include:  · Fever.  · Headache.  · Stiff neck.  · Muscle aches.  · Nausea and vomiting.  · Sensitivity to light.  · Tiredness.  · Cough.  How is this diagnosed?  This condition may be diagnosed based on your symptoms, your medical history, and a physical exam. You may be asked to touch your chin to your neck to see if this causes pain. You may have tests, such as:  · Lumbar puncture. In this procedure, also called a spinal tap, a small amount of fluid from your spinal canal (cerebrospinal fluid) is removed and analyzed.  · Blood tests.  · Other fluid or tissue samples.  · CT scan.  · MRI.  How is this treated?  Most types of viral meningitis go away without treatment. You may be given antibiotic medicine through an IV tube until your health care provider is sure that you do not have bacterial meningitis. The antibiotic will be stopped as soon as viral meningitis is diagnosed.  Depending on the type of virus that caused your meningitis, you may be given:  · Antiretroviral medicine.  · Antiviral medicine.  · Medicines that reduce fever and pain.  · Medicines that reduce swelling (steroids).  Follow these instructions at home:  · Take over-the-counter and prescription medicines only as told by your health care provider.  · If you are taking a medicine for viral meningitis, do not stop taking the medicine even if  you start to feel better.  · Drink enough fluid to keep your urine clear or pale yellow.  · Rest at home until you feel better. Return to your normal activities as told by your health care provider.  · Keep all follow-up visits as told by your health care provider. This is important.  How is this prevented?  · Get a flu shot (influenza vaccination) every year. This will help prevent meningitis that is caused by flu viruses.  · Wash your hands often with soap and water. If soap and water are not available, use hand .  · Avoid touching your hands to your face when you have not washed your hands recently.  · Avoid close contact with people who are sick.  · Disinfect counters and other surfaces if someone in your home is sick.  · Stay home while you are sick, and try to stay away from others as much as possible to avoid spreading the infection.  · Cover your nose and mouth when you sneeze or cough.  · Use insect repellent to prevent mosquito bites.  Contact a health care provider if:  · Your symptoms do not improve after 7-10 days.  · You have a fever that does not get better with medicine.  Get help right away if:  · Your symptoms get worse.  · You become confused.  · You become very sleepy.  This information is not intended to replace advice given to you by your health care provider. Make sure you discuss any questions you have with your health care provider.  Document Released: 04/10/2017 Document Revised: 05/25/2017 Document Reviewed: 02/20/2017  [employer] Interactive Patient Education © 2017 [employer]    Depression / Suicide Risk    As you are discharged from this [facility] facility, it is important to learn how to keep safe from harming yourself.    Recognize the warning signs:  · Abrupt changes in personality, positive or negative- including increase in energy   · Giving away possessions  · Change in eating patterns- significant weight changes-  positive or negative  · Change in sleeping patterns-  unable to sleep or sleeping all the time   · Unwillingness or inability to communicate  · Depression  · Unusual sadness, discouragement and loneliness  · Talk of wanting to die  · Neglect of personal appearance   · Rebelliousness- reckless behavior  · Withdrawal from people/activities they love  · Confusion- inability to concentrate     If you or a loved one observes any of these behaviors or has concerns about self-harm, here's what you can do:  · Talk about it- your feelings and reasons for harming yourself  · Remove any means that you might use to hurt yourself (examples: pills, rope, extension cords, firearm)  · Get professional help from the community (Mental Health, Substance Abuse, psychological counseling)  · Do not be alone:Call your Safe Contact- someone whom you trust who will be there for you.  · Call your local CRISIS HOTLINE 813-3687 or 914-400-9081  · Call your local Children's Mobile Crisis Response Team [place] (372) 651-2326 or www.[employer]  · Call the toll free National Suicide Prevention Hotlines   · National Suicide Prevention Lifeline 616-380-RJKL (2071)  · National Hope Line Network 800-SUICIDE (598-3449)

## 2018-07-02 NOTE — PROGRESS NOTES
Received report from night staff. Assumed pt care with FRANKI Medel . Pain level 0/10. AOX4. POC discussed. Call light within reach, bed in lowest position, and personal items accessible.

## 2018-07-06 ENCOUNTER — HOSPITAL ENCOUNTER (OUTPATIENT)
Facility: MEDICAL CENTER | Age: 28
End: 2018-07-06
Attending: OBSTETRICS & GYNECOLOGY | Admitting: OBSTETRICS & GYNECOLOGY
Payer: COMMERCIAL

## 2018-07-13 ENCOUNTER — OFFICE VISIT (OUTPATIENT)
Dept: MEDICAL GROUP | Facility: PHYSICIAN GROUP | Age: 28
End: 2018-07-13
Payer: COMMERCIAL

## 2018-07-13 VITALS
BODY MASS INDEX: 32.02 KG/M2 | SYSTOLIC BLOOD PRESSURE: 106 MMHG | HEIGHT: 67 IN | TEMPERATURE: 98.6 F | WEIGHT: 204 LBS | RESPIRATION RATE: 14 BRPM | DIASTOLIC BLOOD PRESSURE: 80 MMHG | OXYGEN SATURATION: 99 % | HEART RATE: 73 BPM

## 2018-07-13 DIAGNOSIS — O03.9 MISCARRIAGE: ICD-10-CM

## 2018-07-13 DIAGNOSIS — G03.9 MENINGITIS: ICD-10-CM

## 2018-07-13 PROCEDURE — 99213 OFFICE O/P EST LOW 20 MIN: CPT | Performed by: INTERNAL MEDICINE

## 2018-07-13 NOTE — PROGRESS NOTES
PRIMARY CARE HOSPITAL FOLLOW UP VISIT  Chief Complaint   Patient presents with   • Meningitis   • Miscarriage       History of Present Illness     Dusty is a 29 yo female patient of Arabella Richmond from [facility] who was recently admitted to [facility] for the following:     Meningitis  She was discharged from [facility] on 7/2/2018 for suspected viral meningitis. She still has back pain and feels jolt like headaches. Her headaches have improved greatly since she was admitted to the hospital. Denies fevers, chills. She has a history of migraines and had overheard that her MRI at [facility] showed possible cyst but isn't sure. Prior to her presentation for meningitis however her headache was different and lasting longer which is the reason she had originally presented to the [facility] ER and then was transferred to [facility] for insurance purposes. Infectious disease evaluated her at [facility] and treated her with IV ceftriaxone. She had more photophobia and phonophobia when she presented to the emergency room at [facility] but those symptoms have improved.     Miscarriage  During her recent hospitalization she was found to have fetal demise but passed the products of conception a week ago. She has been following with ob/gyn and her D&C had been planned for the day after she passed the products of conception so that procedure was canceled.     Current Outpatient Prescriptions   Medication Sig Dispense Refill   • norgestimate-ethinyl estradiol (SPRINTEC 28) 0.25-35 MG-MCG per tablet Take 1 Tab by mouth every day. 84 Tab 0   • ropinirole (REQUIP) 0.5 MG TABS Take 1 Tab by mouth every bedtime. 30 Tab 3   • Prenatal Vit-Fe Fumarate-FA (PRENATAL PO) Take  by mouth.     • Naproxen Sodium (ALEVE PO) Take  by mouth.       No current facility-administered medications for this visit.      ROS  As per HPI above. All other systems reviewed and negative.        Objective   Blood pressure 106/80, pulse 73, temperature 37 °C  "(98.6 °F), resp. rate 14, height 1.702 m (5' 7\"), weight 92.5 kg (204 lb), SpO2 99 %. Body mass index is 31.95 kg/m².    General: alert and oriented, pleasant, cooperative  HEENT: Normocephalic, atraumatic. No thyroid masses. Oropharynx clear without exudate or injection. EOMI, PERRLA   Cardiovascular: regular rate and rhythm, normal S1/S2  Pulmonary: lungs clear to auscultation bilaterally  Gastrointestinal: no tenderness to palpation. No hepatosplenomegaly. Bowel sounds normoactive  Lymphatics: no cervical or supraclavicular lymphadenopathy   Skin: lumbar tap site well healed   Psychiatric: appropriate mood and affect. Good insight and appropriate judgment     Assessment and Plan   The following treatment plan was discussed     1. Meningitis  Offered reassurance that likely she still has meningeal irritation and could also have a headache from the spinal tap itself. I cannot identify [facility] CSF or neuroimaging records for review but [facility] infectious disease listed them as being negative and Dusty was treated for a presumed viral meningitis. She is currently neurologically intact and her headaches have greatly improved since hospital admission. Advised that she follow up with her PCP Dr. Gonzalez who is in the [facility] network and should be able to see their records.     2. Miscarriage  Passed products of conception a week ago and appears non-toxic. Following HCG levels with her ob/gyn Dr. Lavinia Young.       Return if symptoms worsen or fail to improve.    Kareem Osuna MD  Internal Medicine  [facility]                   "

## 2018-07-13 NOTE — ASSESSMENT & PLAN NOTE
During her recent hospitalization she was found to have fetal demise but passed the products of conception a week ago. She has been following with ob/gyn and her D&C had been planned for the day after she passed the products of conception so that procedure was canceled.

## 2018-07-13 NOTE — ASSESSMENT & PLAN NOTE
She was discharged from [facility] on 7/2/2018 for suspected viral meningitis. She still has back pain and feels jolt like headaches. Her headaches have improved greatly since she was admitted to the hospital. Denies fevers, chills. She has a history of migraines and had overheard that her MRI at [facility] showed possible cyst but isn't sure. Prior to her presentation for meningitis however her headache was different and lasting longer which is the reason she had originally presented to the [facility] ER and then was transferred to [facility] for insurance purposes. Infectious disease evaluated her at [facility] and treated her with IV ceftriaxone. She had more photophobia and phonophobia when she presented to the emergency room at [facility] but those symptoms have improved.

## 2019-05-15 ENCOUNTER — NON-PROVIDER VISIT (OUTPATIENT)
Dept: URGENT CARE | Facility: PHYSICIAN GROUP | Age: 29
End: 2019-05-15

## 2019-05-15 DIAGNOSIS — Z11.1 ENCOUNTER FOR PPD TEST: ICD-10-CM

## 2019-05-16 PROCEDURE — 86580 TB INTRADERMAL TEST: CPT | Performed by: PHYSICIAN ASSISTANT

## 2019-05-17 ENCOUNTER — NON-PROVIDER VISIT (OUTPATIENT)
Dept: URGENT CARE | Facility: PHYSICIAN GROUP | Age: 29
End: 2019-05-17

## 2019-05-17 LAB — TB WHEAL 3D P 5 TU DIAM: NORMAL MM

## 2019-05-18 NOTE — PROGRESS NOTES
Dusty YIP is a 29 y.o. female here for a non-provider visit for PPD reading -- Step 1 of 1.      1.  Resulted in Epic under enter/edit results? Yes   2.  TB evaluation questionnaire scanned into chart and original given to patient?Yes      3. Was induration greater than 0 mm? No.    Routed to PCP? No

## 2019-07-23 LAB
ABO GROUP BLD: NORMAL
RH BLD: NORMAL
RUBV IGG SERPL IA-ACNC: NORMAL
TREPONEMA PALLIDUM IGG+IGM AB [PRESENCE] IN SERUM OR PLASMA BY IMMUNOASSAY: NORMAL

## 2019-09-13 ENCOUNTER — HOSPITAL ENCOUNTER (OUTPATIENT)
Dept: LAB | Facility: MEDICAL CENTER | Age: 29
End: 2019-09-13
Attending: NURSE PRACTITIONER
Payer: COMMERCIAL

## 2019-09-13 PROCEDURE — 36415 COLL VENOUS BLD VENIPUNCTURE: CPT

## 2019-09-13 PROCEDURE — 82105 ALPHA-FETOPROTEIN SERUM: CPT

## 2019-09-15 ENCOUNTER — HOSPITAL ENCOUNTER (EMERGENCY)
Facility: MEDICAL CENTER | Age: 29
End: 2019-09-15
Attending: EMERGENCY MEDICINE
Payer: COMMERCIAL

## 2019-09-15 ENCOUNTER — APPOINTMENT (OUTPATIENT)
Dept: RADIOLOGY | Facility: MEDICAL CENTER | Age: 29
End: 2019-09-15
Attending: EMERGENCY MEDICINE
Payer: COMMERCIAL

## 2019-09-15 VITALS
HEIGHT: 67 IN | RESPIRATION RATE: 16 BRPM | TEMPERATURE: 96.8 F | DIASTOLIC BLOOD PRESSURE: 56 MMHG | OXYGEN SATURATION: 97 % | WEIGHT: 224.21 LBS | BODY MASS INDEX: 35.19 KG/M2 | SYSTOLIC BLOOD PRESSURE: 101 MMHG | HEART RATE: 80 BPM

## 2019-09-15 DIAGNOSIS — L50.9 URTICARIA: ICD-10-CM

## 2019-09-15 PROCEDURE — 700111 HCHG RX REV CODE 636 W/ 250 OVERRIDE (IP)

## 2019-09-15 PROCEDURE — 700111 HCHG RX REV CODE 636 W/ 250 OVERRIDE (IP): Performed by: EMERGENCY MEDICINE

## 2019-09-15 PROCEDURE — 96374 THER/PROPH/DIAG INJ IV PUSH: CPT

## 2019-09-15 PROCEDURE — 96375 TX/PRO/DX INJ NEW DRUG ADDON: CPT

## 2019-09-15 PROCEDURE — 76815 OB US LIMITED FETUS(S): CPT

## 2019-09-15 PROCEDURE — 99284 EMERGENCY DEPT VISIT MOD MDM: CPT

## 2019-09-15 RX ORDER — PREDNISONE 20 MG/1
60 TABLET ORAL DAILY
Status: DISCONTINUED | OUTPATIENT
Start: 2019-09-15 | End: 2019-09-15 | Stop reason: HOSPADM

## 2019-09-15 RX ORDER — ONDANSETRON 2 MG/ML
4 INJECTION INTRAMUSCULAR; INTRAVENOUS ONCE
Status: COMPLETED | OUTPATIENT
Start: 2019-09-15 | End: 2019-09-15

## 2019-09-15 RX ORDER — PREDNISONE 20 MG/1
60 TABLET ORAL DAILY
Qty: 15 TAB | Refills: 0 | Status: SHIPPED | OUTPATIENT
Start: 2019-09-15 | End: 2019-09-20

## 2019-09-15 RX ORDER — METHYLPREDNISOLONE SODIUM SUCCINATE 125 MG/2ML
62.5 INJECTION, POWDER, LYOPHILIZED, FOR SOLUTION INTRAMUSCULAR; INTRAVENOUS ONCE
Status: COMPLETED | OUTPATIENT
Start: 2019-09-15 | End: 2019-09-15

## 2019-09-15 RX ORDER — DIPHENHYDRAMINE HYDROCHLORIDE 50 MG/ML
50 INJECTION INTRAMUSCULAR; INTRAVENOUS ONCE
Status: COMPLETED | OUTPATIENT
Start: 2019-09-15 | End: 2019-09-15

## 2019-09-15 RX ADMIN — ONDANSETRON 4 MG: 2 INJECTION INTRAMUSCULAR; INTRAVENOUS at 06:55

## 2019-09-15 RX ADMIN — DIPHENHYDRAMINE HYDROCHLORIDE 50 MG: 50 INJECTION INTRAMUSCULAR; INTRAVENOUS at 06:42

## 2019-09-15 RX ADMIN — METHYLPREDNISOLONE SODIUM SUCCINATE 62.5 MG: 125 INJECTION, POWDER, FOR SOLUTION INTRAMUSCULAR; INTRAVENOUS at 06:56

## 2019-09-15 NOTE — ED NOTES
Pt resting with visitor at bedside, call light in reach.  Nausea resolved, updated about plan of care.  Needs attended at this time.

## 2019-09-15 NOTE — ED TRIAGE NOTES
"Dusty Birmingham MELANAPHY  29 y.o. female  Chief Complaint   Patient presents with   • Allergic Reaction   • Rash     Pt ambulated to triage with steady gait for above complaint.      Pt reports yesterday morning waking up to an itchy scalp. Throughout the day she began noticing other allergic reaction symptoms such as hives on her arms and swelling to her face. Denies itchy throat, difficulty breathing. She took 50mg benadryl last night at 19:30 and 23:00 per her OBGYN okay (pt is 16 weeks pregnant). Pt has not had a resolution of symptoms and was instructed to come to the ER. Pt denies contact with any allergens and no changes to laundry detergent.     Pt back to lobby. Educated to inform staff of any concerns or changes.     Blood Pressure: 132/66, Pulse: 90, Respiration: 16, Temperature: 36 °C (96.8 °F), Height: 170.2 cm (5' 7\"), Weight: 101.7 kg (224 lb 3.3 oz), Pulse Oximetry: 100 %, O2 Delivery: None (Room Air)    "

## 2019-09-15 NOTE — ED PROVIDER NOTES
ED Provider Note    CHIEF COMPLAINT  Chief Complaint   Patient presents with   • Allergic Reaction   • Rash       \[facility]\""  Dusty YIP is a 29 y.o. female who presents with a rash.  The patient developed a diffuse pruritic rash 2 days ago.  She states she has a known history of allergies but has not had any recent sulfa drugs nor radish that she is typically allergic to.  She presents the emergency department with diffuse pruritus.  She does not have any associated difficulty with breathing or swallowing.  The patient does not have any difficulties with urination nor she been any recent antibiotics.  She is currently 16 weeks pregnant.  REVIEW OF SYSTEMS  See HPI for further details. All other systems are negative.     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[employer]      Employer: student   Social Needs   • Financial resource strain: Not on file   • Food insecurity:     Worry: Not on file     Inability: Not on file   • Transportation needs:     Medical: Not on file     Non-medical: Not on file   Tobacco Use   • Smoking status: Former Smoker   • Smokeless tobacco: Never Used   • Tobacco comment: was smoking Hooka/hashish 3-4 times a week, quit 8/28/13   Substance and Sexual Activity   • Alcohol use: No     Comment: socially,   • Drug use: No   • Sexual activity: Yes     Partners: Male     Birth control/protection: Condom, Pill   Lifestyle   • Physical activity:     Days per week: Not on file     Minutes per session: Not on file   • Stress: Not on file   Relationships   • Social  connections:     Talks on phone: Not on file     Gets together: Not on file     Attends Shinto service: Not on file     Active member of club or organization: Not on file     Attends meetings of clubs or organizations: Not on file     Relationship status: Not on file   • Intimate partner violence:     Fear of current or ex partner: Not on file     Emotionally abused: Not on file     Physically abused: Not on file     Forced sexual activity: Not on file   Other Topics Concern   • Not on file   Social History Narrative    ** Merged History Encounter **            SURGICAL HISTORY  Past Surgical History:   Procedure Laterality Date   • ANKLE ORIF  3/2/2012    Performed by PIEDAD NEFF at [facility]   • TURBINOPLASTY  12/19/2008    Performed by EMILIANO LUNA at [facility]   • NASAL SEPTAL RECONSTRUCTION  12/19/2008    Performed by EMILIANO LUNA at [facility]   • NASAL SEPTAL RECONST  2006   • NASAL SEPTAL RECONSTRUCTION  2005   • TONSILLECTOMY AND ADENOIDECTOMY  1997   • OTHER ORTHOPEDIC SURGERY         CURRENT MEDICATIONS  Home Medications     Reviewed by Dave Mace R.N. (Registered Nurse) on 09/15/19 at 0537  Med List Status: Partial   Medication Last Dose Status   Naproxen Sodium (ALEVE PO)  Active   norgestimate-ethinyl estradiol (SPRINTEC 28) 0.25-35 MG-MCG per tablet  Active   Prenatal Vit-Fe Fumarate-FA (PRENATAL PO)  Active   ropinirole (REQUIP) 0.5 MG TABS  Active                ALLERGIES  Allergies   Allergen Reactions   • Radish [Raphanus Sativus] Hives   • Sulfa Drugs Hives   • Vancomycin Rash     Pt currently red and blotchy on anterior and posterior thoracic area from previous dose of Vanco. Tolerated vancomycin 6/29/18 without issue (suspect infusion related reaction).   • Other Food Hives     Cranberries and radishes   • Other Food Hives     Radishes (Raphanus Sativus)   • Sulfa Drugs Hives       PHYSICAL EXAM  VITAL SIGNS: /66    "Pulse 90   Temp 36 °C (96.8 °F) (Temporal)   Resp 16   Ht 1.702 m (5' 7\")   Wt 101.7 kg (224 lb 3.3 oz)   SpO2 100%   BMI 35.12 kg/m²  Room air O2: 100    Constitutional: Mild acute distress, Non-toxic appearance.   HENT: Normocephalic, Atraumatic, Bilateral external ears normal, Oropharynx moist, No oral exudates, Nose normal.   Eyes: PERRLA, EOMI, Conjunctiva normal, No discharge.   Neck: Normal range of motion, No tenderness, Supple, No stridor.   Lymphatic: No lymphadenopathy noted.   Cardiovascular: Normal heart rate, Normal rhythm, No murmurs, No rubs, No gallops.   Thorax & Lungs: Normal breath sounds, No respiratory distress, No wheezing, No chest tenderness.   Abdomen: Bowel sounds normal, Soft, No tenderness, No masses, No pulsatile masses.   Skin: Diffuse urticarial rash.   Back: No tenderness, No CVA tenderness.   Extremities: Intact distal pulses, No edema, No tenderness, No cyanosis, No clubbing.   Musculoskeletal: Good range of motion in all major joints. No tenderness to palpation or major deformities noted.   Neurologic: Alert & oriented x 3, Normal motor function, Normal sensory function, No focal deficits noted.   Psychiatric: Affect normal, Judgment normal, Mood normal.       RADIOLOGY/PROCEDURES  US-OB LIMITED TRANSABDOMINAL   Final Result      Single live intrauterine pregnancy of an estimated gestational age of Average 16w 5d with an estimated date of delivery of 2/25/2020.      Complete fetal survey was not performed.            COURSE & MEDICAL DECISION MAKING  Pertinent Labs & Imaging studies reviewed. (See chart for details)  This is a 29-year-old female who presents the emerge department with an urticarial-like rash during her pregnancy.  She does have some involvement of the periumbilical region but this not concentrated in this region.  Most of her rashes on her anterior chest as well as throughout her extremities.  Therefore I am not convinced this is a PUP however I also do not " have a clear source for the allergic etiology.  Regardless the patient will be treated with steroids for 5 days as well as Benadryl.  She will follow-up with her obstetrician within 40 to 72 hours for repeat examination.  She has been observed for a prolonged period of time after she received intravenous steroids and Benadryl.  The patient has had significant improvement.  At the time of discharge she does not have any uvular edema and her lungs are clear.  She is not hypoxic.  I will place the patient on 4 more days of prednisone and she will take Benadryl as needed.  We did perform an ultrasound of the patient does have a normal intrauterine pregnancy.    FINAL IMPRESSION  1.  Urticaria  2.  16-week intrauterine pregnancy    Disposition  The patient will be discharged in stable condition         Electronically signed by: Chino Apodaca, 9/15/2019 6:31 AM

## 2019-09-16 ENCOUNTER — HOSPITAL ENCOUNTER (EMERGENCY)
Facility: MEDICAL CENTER | Age: 29
End: 2019-09-16
Attending: EMERGENCY MEDICINE
Payer: COMMERCIAL

## 2019-09-16 VITALS
TEMPERATURE: 98.4 F | RESPIRATION RATE: 20 BRPM | WEIGHT: 226.19 LBS | HEART RATE: 79 BPM | HEIGHT: 67 IN | BODY MASS INDEX: 35.5 KG/M2 | OXYGEN SATURATION: 93 % | DIASTOLIC BLOOD PRESSURE: 57 MMHG | SYSTOLIC BLOOD PRESSURE: 112 MMHG

## 2019-09-16 DIAGNOSIS — R21 RASH: ICD-10-CM

## 2019-09-16 LAB
ALBUMIN SERPL BCP-MCNC: 3.1 G/DL (ref 3.2–4.9)
ALBUMIN/GLOB SERPL: 1.4 G/DL
ALP SERPL-CCNC: 38 U/L (ref 30–99)
ALT SERPL-CCNC: 9 U/L (ref 2–50)
ANION GAP SERPL CALC-SCNC: 7 MMOL/L (ref 0–11.9)
AST SERPL-CCNC: 13 U/L (ref 12–45)
BASOPHILS # BLD AUTO: 0.3 % (ref 0–1.8)
BASOPHILS # BLD: 0.05 K/UL (ref 0–0.12)
BILIRUB SERPL-MCNC: 0.5 MG/DL (ref 0.1–1.5)
BUN SERPL-MCNC: 11 MG/DL (ref 8–22)
CALCIUM SERPL-MCNC: 7.9 MG/DL (ref 8.5–10.5)
CHLORIDE SERPL-SCNC: 107 MMOL/L (ref 96–112)
CO2 SERPL-SCNC: 23 MMOL/L (ref 20–33)
CREAT SERPL-MCNC: 0.59 MG/DL (ref 0.5–1.4)
CRP SERPL HS-MCNC: 6.92 MG/DL (ref 0–0.75)
EOSINOPHIL # BLD AUTO: 0.01 K/UL (ref 0–0.51)
EOSINOPHIL NFR BLD: 0.1 % (ref 0–6.9)
ERYTHROCYTE [DISTWIDTH] IN BLOOD BY AUTOMATED COUNT: 44.3 FL (ref 35.9–50)
ERYTHROCYTE [SEDIMENTATION RATE] IN BLOOD BY WESTERGREN METHOD: 8 MM/HOUR (ref 0–20)
GLOBULIN SER CALC-MCNC: 2.2 G/DL (ref 1.9–3.5)
GLUCOSE SERPL-MCNC: 110 MG/DL (ref 65–99)
HCT VFR BLD AUTO: 40 % (ref 37–47)
HGB BLD-MCNC: 13.1 G/DL (ref 12–16)
IMM GRANULOCYTES # BLD AUTO: 0.07 K/UL (ref 0–0.11)
IMM GRANULOCYTES NFR BLD AUTO: 0.4 % (ref 0–0.9)
LYMPHOCYTES # BLD AUTO: 0.72 K/UL (ref 1–4.8)
LYMPHOCYTES NFR BLD: 4.3 % (ref 22–41)
MCH RBC QN AUTO: 30.6 PG (ref 27–33)
MCHC RBC AUTO-ENTMCNC: 32.8 G/DL (ref 33.6–35)
MCV RBC AUTO: 93.5 FL (ref 81.4–97.8)
MONOCYTES # BLD AUTO: 0.2 K/UL (ref 0–0.85)
MONOCYTES NFR BLD AUTO: 1.2 % (ref 0–13.4)
NEUTROPHILS # BLD AUTO: 15.54 K/UL (ref 2–7.15)
NEUTROPHILS NFR BLD: 93.7 % (ref 44–72)
NRBC # BLD AUTO: 0 K/UL
NRBC BLD-RTO: 0 /100 WBC
PLATELET # BLD AUTO: 207 K/UL (ref 164–446)
PMV BLD AUTO: 10 FL (ref 9–12.9)
POTASSIUM SERPL-SCNC: 3.3 MMOL/L (ref 3.6–5.5)
PROT SERPL-MCNC: 5.3 G/DL (ref 6–8.2)
RBC # BLD AUTO: 4.28 M/UL (ref 4.2–5.4)
SODIUM SERPL-SCNC: 137 MMOL/L (ref 135–145)
WBC # BLD AUTO: 16.6 K/UL (ref 4.8–10.8)

## 2019-09-16 PROCEDURE — 86140 C-REACTIVE PROTEIN: CPT

## 2019-09-16 PROCEDURE — 96375 TX/PRO/DX INJ NEW DRUG ADDON: CPT

## 2019-09-16 PROCEDURE — 80053 COMPREHEN METABOLIC PANEL: CPT

## 2019-09-16 PROCEDURE — 700111 HCHG RX REV CODE 636 W/ 250 OVERRIDE (IP): Performed by: EMERGENCY MEDICINE

## 2019-09-16 PROCEDURE — 36415 COLL VENOUS BLD VENIPUNCTURE: CPT

## 2019-09-16 PROCEDURE — 99284 EMERGENCY DEPT VISIT MOD MDM: CPT

## 2019-09-16 PROCEDURE — 85652 RBC SED RATE AUTOMATED: CPT

## 2019-09-16 PROCEDURE — 96374 THER/PROPH/DIAG INJ IV PUSH: CPT

## 2019-09-16 PROCEDURE — 85025 COMPLETE CBC W/AUTO DIFF WBC: CPT

## 2019-09-16 RX ORDER — HYDROXYZINE HYDROCHLORIDE 25 MG/1
25 TABLET, FILM COATED ORAL 3 TIMES DAILY PRN
Qty: 30 TAB | Refills: 1 | Status: ON HOLD | OUTPATIENT
Start: 2019-09-16 | End: 2020-02-28

## 2019-09-16 RX ORDER — METHYLPREDNISOLONE SODIUM SUCCINATE 125 MG/2ML
125 INJECTION, POWDER, LYOPHILIZED, FOR SOLUTION INTRAMUSCULAR; INTRAVENOUS ONCE
Status: COMPLETED | OUTPATIENT
Start: 2019-09-16 | End: 2019-09-16

## 2019-09-16 RX ADMIN — METHYLPREDNISOLONE SODIUM SUCCINATE 125 MG: 125 INJECTION, POWDER, FOR SOLUTION INTRAMUSCULAR; INTRAVENOUS at 08:20

## 2019-09-16 RX ADMIN — FAMOTIDINE 20 MG: 10 INJECTION INTRAVENOUS at 08:20

## 2019-09-16 ASSESSMENT — LIFESTYLE VARIABLES: DO YOU DRINK ALCOHOL: NO

## 2019-09-16 NOTE — ED PROVIDER NOTES
ED Provider Note    CHIEF COMPLAINT  Chief Complaint   Patient presents with   • Rash     generalized T/O, seen her yesterday for same       HPI  Dusty YIP is a 29 y.o. female who presents for evaluation of rash.  The patient reports that several days ago she developed a red intensely pruritic rash throughout her face torso extremities.  It spares her palms and soles.  She is on aspirin and prenatal vitamins.  She has had 2 miscarriages in the past he was followed by  working with OB/GYN.  She was seen here yesterday started on antihistamines and steroids.  She reports that it helped somewhat but she had intense pruritus this morning and comes in for evaluation.  She never describes or endorses lip or facial swelling wheezing trouble breathing.  No high fevers headache or neck stiffness.  No other symptoms such as night sweats or weight loss.  She is approximately 16 weeks pregnant.  No issues with her pregnancy thus far.  She has not reported any tick or insect bites, no exotic travel no recent antibiotic use    REVIEW OF SYSTEMS  See HPI for further details.  No high fever headache neck stiffness all other systems are negative.     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[employer]      Employer: student   Social Needs   • Financial resource strain: Not on file   • Food insecurity:     Worry: Not on file     Inability: Not on file   • Transportation needs:     Medical: Not on file     Non-medical: Not on file   Tobacco Use   • Smoking status: Former Smoker   • Smokeless  tobacco: Never Used   • Tobacco comment: was smoking Hooka/hashish 3-4 times a week, quit 8/28/13   Substance and Sexual Activity   • Alcohol use: No     Comment: socially,   • Drug use: No   • Sexual activity: Yes     Partners: Male     Birth control/protection: Condom, Pill   Lifestyle   • Physical activity:     Days per week: Not on file     Minutes per session: Not on file   • Stress: Not on file   Relationships   • Social connections:     Talks on phone: Not on file     Gets together: Not on file     Attends Congregational service: Not on file     Active member of club or organization: Not on file     Attends meetings of clubs or organizations: Not on file     Relationship status: Not on file   • Intimate partner violence:     Fear of current or ex partner: Not on file     Emotionally abused: Not on file     Physically abused: Not on file     Forced sexual activity: Not on file   Other Topics Concern   • Not on file   Social History Narrative    ** Merged History Encounter **            SURGICAL HISTORY  Past Surgical History:   Procedure Laterality Date   • ANKLE ORIF  3/2/2012    Performed by PIEDAD NEFF at [facility]   • TURBINOPLASTY  12/19/2008    Performed by EMILIANO LUNA at [facility]   • NASAL SEPTAL RECONSTRUCTION  12/19/2008    Performed by EMILIANO LUNA at [facility]   • NASAL SEPTAL RECONST  2006   • NASAL SEPTAL RECONSTRUCTION  2005   • TONSILLECTOMY AND ADENOIDECTOMY  1997   • OTHER ORTHOPEDIC SURGERY         CURRENT MEDICATIONS  Home Medications    **Home medications have not yet been reviewed for this encounter**     Aspirin prenatal vitamin    ALLERGIES  Allergies   Allergen Reactions   • Radish [Raphanus Sativus] Hives   • Sulfa Drugs Hives   • Vancomycin Rash     Pt currently red and blotchy on anterior and posterior thoracic area from previous dose of Vanco. Tolerated vancomycin 6/29/18 without issue (suspect infusion related reaction).  "  • Other Food Hives     Cranberries and radishes   • Other Food Hives     Radishes (Raphanus Sativus)   • Sulfa Drugs Hives       PHYSICAL EXAM  VITAL SIGNS: BP (!) 99/48   Pulse 94   Temp 36.9 °C (98.4 °F) (Temporal)   Resp 18   Ht 1.702 m (5' 7\")   Wt 102.6 kg (226 lb 3.1 oz)   SpO2 98%   BMI 35.43 kg/m²       Constitutional: Well developed, Well nourished, No acute distress, Non-toxic appearance.   HENT: Normocephalic, Atraumatic, Bilateral external ears normal, Oropharynx moist, No oral exudates, Nose normal.   Eyes: PERRLA, EOMI, Conjunctiva normal, No discharge.   Neck: Normal range of motion, No tenderness, Supple, No stridor.   Cardiovascular: Normal heart rate, Normal rhythm, No murmurs, No rubs, No gallops.   Thorax & Lungs: Normal breath sounds, No respiratory distress, No wheezing, No chest tenderness.   Abdomen: Bowel sounds normal, Soft, No tenderness, No masses, No pulsatile masses.   Skin: There is a red nonraised non-palpable rash overlying the extremities, chest abdomen essentially spares the palms and soles no mucous membrane involvement.  There are some distinct areas with central clearing suggesting possible target lesions no vesicles no petechiae no purpura.  It is blanchable.  With steroid and antihistamine treatment certain areas completely resolved  Back: No tenderness, No CVA tenderness.   Extremities: Intact distal pulses, No edema, No tenderness, No cyanosis, No clubbing.   Musculoskeletal: Good range of motion in all major joints. No tenderness to palpation or major deformities noted.   Neurologic: Alert & oriented x 3, Normal motor function, Normal sensory function, No focal deficits noted.   Psychiatric: Anxious    COURSE & MEDICAL DECISION MAKING  Pertinent Labs & Imaging studies reviewed. (See chart for details)  Results for orders placed or performed during the hospital encounter of 09/16/19   CBC WITH DIFFERENTIAL   Result Value Ref Range    WBC 16.6 (H) 4.8 - 10.8 K/uL    " RBC 4.28 4.20 - 5.40 M/uL    Hemoglobin 13.1 12.0 - 16.0 g/dL    Hematocrit 40.0 37.0 - 47.0 %    MCV 93.5 81.4 - 97.8 fL    MCH 30.6 27.0 - 33.0 pg    MCHC 32.8 (L) 33.6 - 35.0 g/dL    RDW 44.3 35.9 - 50.0 fL    Platelet Count 207 164 - 446 K/uL    MPV 10.0 9.0 - 12.9 fL    Neutrophils-Polys 93.70 (H) 44.00 - 72.00 %    Lymphocytes 4.30 (L) 22.00 - 41.00 %    Monocytes 1.20 0.00 - 13.40 %    Eosinophils 0.10 0.00 - 6.90 %    Basophils 0.30 0.00 - 1.80 %    Immature Granulocytes 0.40 0.00 - 0.90 %    Nucleated RBC 0.00 /100 WBC    Neutrophils (Absolute) 15.54 (H) 2.00 - 7.15 K/uL    Lymphs (Absolute) 0.72 (L) 1.00 - 4.80 K/uL    Monos (Absolute) 0.20 0.00 - 0.85 K/uL    Eos (Absolute) 0.01 0.00 - 0.51 K/uL    Baso (Absolute) 0.05 0.00 - 0.12 K/uL    Immature Granulocytes (abs) 0.07 0.00 - 0.11 K/uL    NRBC (Absolute) 0.00 K/uL   Comp Metabolic Panel   Result Value Ref Range    Sodium 137 135 - 145 mmol/L    Potassium 3.3 (L) 3.6 - 5.5 mmol/L    Chloride 107 96 - 112 mmol/L    Co2 23 20 - 33 mmol/L    Anion Gap 7.0 0.0 - 11.9    Glucose 110 (H) 65 - 99 mg/dL    Bun 11 8 - 22 mg/dL    Creatinine 0.59 0.50 - 1.40 mg/dL    Calcium 7.9 (L) 8.5 - 10.5 mg/dL    AST(SGOT) 13 12 - 45 U/L    ALT(SGPT) 9 2 - 50 U/L    Alkaline Phosphatase 38 30 - 99 U/L    Total Bilirubin 0.5 0.1 - 1.5 mg/dL    Albumin 3.1 (L) 3.2 - 4.9 g/dL    Total Protein 5.3 (L) 6.0 - 8.2 g/dL    Globulin 2.2 1.9 - 3.5 g/dL    A-G Ratio 1.4 g/dL   WESTERGREN SED RATE   Result Value Ref Range    Sed Rate Westergren 8 0 - 20 mm/hour   CRP QUANTITIVE (NON-CARDIAC)   Result Value Ref Range    Stat C-Reactive Protein 6.92 (H) 0.00 - 0.75 mg/dL   ESTIMATED GFR   Result Value Ref Range    GFR If African American >60 >60 mL/min/1.73 m 2    GFR If Non African American >60 >60 mL/min/1.73 m 2      Presents here in the pregnancy with rash.  Differential diagnosis includes urticaria of pregnancy, PUPPP, erythema multiforme, nonspecific viral rash etc.  After an IV was  established and she was given a dose of Solu-Medrol and Benadryl the rash substantially improved.  I reviewed the case with Dr. Working confirms that hydroxyzine should be safe and recommends prescribing that.  We will keep her on a short course of prednisone and I counseled her that if she develops any new or worsening symptoms especially mucous membrane involvement sloughing of the skin etc. to return immediately    FINAL IMPRESSION  1.  Urticarial rash         Electronically signed by: Tito Ayoub, 9/16/2019 8:01 AM

## 2019-09-16 NOTE — ED TRIAGE NOTES
Chief Complaint   Patient presents with   • Rash     generalized T/O, seen her yesterday for same

## 2019-09-16 NOTE — ED NOTES
Discussed POC, updated on wait status, answered questions, addressed needs, ensured call light within reach. Provided emotional support for pt.

## 2019-09-16 NOTE — ED NOTES
Pt ambulated to yellow 63, pt said that her rash feels worst, itchy and burning . Lip also swelling and scratchy throat. Provided with gown to change, placed monitor. Iv established.   Up for erp.

## 2019-09-17 LAB
# FETUSES US: NORMAL
AFP MOM SERPL: 0.91
AFP SERPL-MCNC: 23 NG/ML
AGE - REPORTED: 30 YR
CURRENT SMOKER: NO
FAMILY MEMBER DISEASES HX: NO
GA METHOD: NORMAL
GA: NORMAL WK
IDDM PATIENT QL: NO
INTEGRATED SCN PATIENT-IMP: NORMAL
SPECIMEN DRAWN SERPL: NORMAL

## 2020-02-26 ENCOUNTER — ANESTHESIA EVENT (OUTPATIENT)
Dept: ANESTHESIOLOGY | Facility: MEDICAL CENTER | Age: 30
End: 2020-02-26
Payer: COMMERCIAL

## 2020-02-26 ENCOUNTER — HOSPITAL ENCOUNTER (INPATIENT)
Facility: MEDICAL CENTER | Age: 30
LOS: 2 days | End: 2020-02-28
Attending: OBSTETRICS & GYNECOLOGY | Admitting: OBSTETRICS & GYNECOLOGY
Payer: COMMERCIAL

## 2020-02-26 ENCOUNTER — ANESTHESIA (OUTPATIENT)
Dept: ANESTHESIOLOGY | Facility: MEDICAL CENTER | Age: 30
End: 2020-02-26
Payer: COMMERCIAL

## 2020-02-26 DIAGNOSIS — G89.18 PAIN FOLLOWING SURGERY OR PROCEDURE: ICD-10-CM

## 2020-02-26 LAB
APPEARANCE UR: CLEAR
BASOPHILS # BLD AUTO: 0.1 % (ref 0–1.8)
BASOPHILS # BLD: 0.02 K/UL (ref 0–0.12)
COLOR UR AUTO: YELLOW
CREAT UR-MCNC: 62.2 MG/DL
EOSINOPHIL # BLD AUTO: 0.03 K/UL (ref 0–0.51)
EOSINOPHIL NFR BLD: 0.2 % (ref 0–6.9)
ERYTHROCYTE [DISTWIDTH] IN BLOOD BY AUTOMATED COUNT: 46.2 FL (ref 35.9–50)
GLUCOSE UR QL STRIP.AUTO: NEGATIVE MG/DL
HCT VFR BLD AUTO: 43.2 % (ref 37–47)
HGB BLD-MCNC: 14.4 G/DL (ref 12–16)
HOLDING TUBE BB 8507: NORMAL
IMM GRANULOCYTES # BLD AUTO: 0.04 K/UL (ref 0–0.11)
IMM GRANULOCYTES NFR BLD AUTO: 0.3 % (ref 0–0.9)
KETONES UR QL STRIP.AUTO: NEGATIVE MG/DL
LEUKOCYTE ESTERASE UR QL STRIP.AUTO: NEGATIVE
LYMPHOCYTES # BLD AUTO: 1.5 K/UL (ref 1–4.8)
LYMPHOCYTES NFR BLD: 11.2 % (ref 22–41)
MCH RBC QN AUTO: 31.7 PG (ref 27–33)
MCHC RBC AUTO-ENTMCNC: 33.3 G/DL (ref 33.6–35)
MCV RBC AUTO: 95.2 FL (ref 81.4–97.8)
MONOCYTES # BLD AUTO: 0.8 K/UL (ref 0–0.85)
MONOCYTES NFR BLD AUTO: 6 % (ref 0–13.4)
NEUTROPHILS # BLD AUTO: 10.95 K/UL (ref 2–7.15)
NEUTROPHILS NFR BLD: 82.2 % (ref 44–72)
NITRITE UR QL STRIP.AUTO: NEGATIVE
NRBC # BLD AUTO: 0 K/UL
NRBC BLD-RTO: 0 /100 WBC
PH UR STRIP.AUTO: 6.5 [PH] (ref 5–8)
PLATELET # BLD AUTO: 228 K/UL (ref 164–446)
PMV BLD AUTO: 11.6 FL (ref 9–12.9)
PROT UR QL STRIP: NEGATIVE MG/DL
PROT UR-MCNC: 9.9 MG/DL (ref 0–15)
PROT/CREAT UR: 159 MG/G (ref 10–107)
RBC # BLD AUTO: 4.54 M/UL (ref 4.2–5.4)
RBC UR QL AUTO: ABNORMAL
SP GR UR: 1.02 (ref 1–1.03)
WBC # BLD AUTO: 13.3 K/UL (ref 4.8–10.8)

## 2020-02-26 PROCEDURE — 3E0234Z INTRODUCTION OF SERUM, TOXOID AND VACCINE INTO MUSCLE, PERCUTANEOUS APPROACH: ICD-10-PCS | Performed by: OBSTETRICS & GYNECOLOGY

## 2020-02-26 PROCEDURE — 770002 HCHG ROOM/CARE - OB PRIVATE (112)

## 2020-02-26 PROCEDURE — 700111 HCHG RX REV CODE 636 W/ 250 OVERRIDE (IP): Performed by: ANESTHESIOLOGY

## 2020-02-26 PROCEDURE — 700105 HCHG RX REV CODE 258: Performed by: OBSTETRICS & GYNECOLOGY

## 2020-02-26 PROCEDURE — 10907ZC DRAINAGE OF AMNIOTIC FLUID, THERAPEUTIC FROM PRODUCTS OF CONCEPTION, VIA NATURAL OR ARTIFICIAL OPENING: ICD-10-PCS | Performed by: OBSTETRICS & GYNECOLOGY

## 2020-02-26 PROCEDURE — 85025 COMPLETE CBC W/AUTO DIFF WBC: CPT

## 2020-02-26 PROCEDURE — 700102 HCHG RX REV CODE 250 W/ 637 OVERRIDE(OP): Performed by: OBSTETRICS & GYNECOLOGY

## 2020-02-26 PROCEDURE — 700101 HCHG RX REV CODE 250: Performed by: ANESTHESIOLOGY

## 2020-02-26 PROCEDURE — 59409 OBSTETRICAL CARE: CPT

## 2020-02-26 PROCEDURE — 700111 HCHG RX REV CODE 636 W/ 250 OVERRIDE (IP): Performed by: OBSTETRICS & GYNECOLOGY

## 2020-02-26 PROCEDURE — 81002 URINALYSIS NONAUTO W/O SCOPE: CPT

## 2020-02-26 PROCEDURE — 0KQM0ZZ REPAIR PERINEUM MUSCLE, OPEN APPROACH: ICD-10-PCS | Performed by: OBSTETRICS & GYNECOLOGY

## 2020-02-26 PROCEDURE — 36415 COLL VENOUS BLD VENIPUNCTURE: CPT

## 2020-02-26 PROCEDURE — 304965 HCHG RECOVERY SERVICES

## 2020-02-26 PROCEDURE — 700111 HCHG RX REV CODE 636 W/ 250 OVERRIDE (IP)

## 2020-02-26 PROCEDURE — 82570 ASSAY OF URINE CREATININE: CPT

## 2020-02-26 PROCEDURE — 84156 ASSAY OF PROTEIN URINE: CPT

## 2020-02-26 PROCEDURE — A9270 NON-COVERED ITEM OR SERVICE: HCPCS | Performed by: OBSTETRICS & GYNECOLOGY

## 2020-02-26 RX ORDER — LIDOCAINE HYDROCHLORIDE AND EPINEPHRINE 15; 5 MG/ML; UG/ML
INJECTION, SOLUTION EPIDURAL PRN
Status: DISCONTINUED | OUTPATIENT
Start: 2020-02-26 | End: 2020-02-26 | Stop reason: SURG

## 2020-02-26 RX ORDER — IBUPROFEN 600 MG/1
600 TABLET ORAL EVERY 6 HOURS PRN
Status: DISCONTINUED | OUTPATIENT
Start: 2020-02-26 | End: 2020-02-28 | Stop reason: HOSPADM

## 2020-02-26 RX ORDER — MISOPROSTOL 200 UG/1
800 TABLET ORAL
Status: DISCONTINUED | OUTPATIENT
Start: 2020-02-26 | End: 2020-02-27 | Stop reason: HOSPADM

## 2020-02-26 RX ORDER — OXYCODONE HYDROCHLORIDE AND ACETAMINOPHEN 5; 325 MG/1; MG/1
1 TABLET ORAL EVERY 4 HOURS PRN
Status: DISCONTINUED | OUTPATIENT
Start: 2020-02-26 | End: 2020-02-28 | Stop reason: HOSPADM

## 2020-02-26 RX ORDER — CARBOPROST TROMETHAMINE 250 UG/ML
250 INJECTION, SOLUTION INTRAMUSCULAR
Status: DISCONTINUED | OUTPATIENT
Start: 2020-02-26 | End: 2020-02-27 | Stop reason: HOSPADM

## 2020-02-26 RX ORDER — ONDANSETRON 2 MG/ML
4 INJECTION INTRAMUSCULAR; INTRAVENOUS EVERY 4 HOURS PRN
Status: DISCONTINUED | OUTPATIENT
Start: 2020-02-26 | End: 2020-02-28 | Stop reason: HOSPADM

## 2020-02-26 RX ORDER — BUPIVACAINE HYDROCHLORIDE 2.5 MG/ML
INJECTION, SOLUTION EPIDURAL; INFILTRATION; INTRACAUDAL
Status: COMPLETED
Start: 2020-02-26 | End: 2020-02-26

## 2020-02-26 RX ORDER — SODIUM CHLORIDE, SODIUM LACTATE, POTASSIUM CHLORIDE, AND CALCIUM CHLORIDE .6; .31; .03; .02 G/100ML; G/100ML; G/100ML; G/100ML
250 INJECTION, SOLUTION INTRAVENOUS PRN
Status: DISCONTINUED | OUTPATIENT
Start: 2020-02-26 | End: 2020-02-27 | Stop reason: HOSPADM

## 2020-02-26 RX ORDER — DEXTROSE, SODIUM CHLORIDE, SODIUM LACTATE, POTASSIUM CHLORIDE, AND CALCIUM CHLORIDE 5; .6; .31; .03; .02 G/100ML; G/100ML; G/100ML; G/100ML; G/100ML
INJECTION, SOLUTION INTRAVENOUS CONTINUOUS
Status: DISCONTINUED | OUTPATIENT
Start: 2020-02-26 | End: 2020-02-27 | Stop reason: HOSPADM

## 2020-02-26 RX ORDER — SODIUM CHLORIDE, SODIUM LACTATE, POTASSIUM CHLORIDE, AND CALCIUM CHLORIDE .6; .31; .03; .02 G/100ML; G/100ML; G/100ML; G/100ML
1000 INJECTION, SOLUTION INTRAVENOUS
Status: DISCONTINUED | OUTPATIENT
Start: 2020-02-26 | End: 2020-02-27 | Stop reason: HOSPADM

## 2020-02-26 RX ORDER — SODIUM CHLORIDE, SODIUM LACTATE, POTASSIUM CHLORIDE, CALCIUM CHLORIDE 600; 310; 30; 20 MG/100ML; MG/100ML; MG/100ML; MG/100ML
INJECTION, SOLUTION INTRAVENOUS CONTINUOUS
Status: DISPENSED | OUTPATIENT
Start: 2020-02-26 | End: 2020-02-26

## 2020-02-26 RX ORDER — BUPIVACAINE HYDROCHLORIDE 2.5 MG/ML
INJECTION, SOLUTION EPIDURAL; INFILTRATION; INTRACAUDAL PRN
Status: DISCONTINUED | OUTPATIENT
Start: 2020-02-26 | End: 2020-02-26 | Stop reason: SURG

## 2020-02-26 RX ORDER — ROPIVACAINE HYDROCHLORIDE 2 MG/ML
INJECTION, SOLUTION EPIDURAL; INFILTRATION; PERINEURAL CONTINUOUS
Status: DISCONTINUED | OUTPATIENT
Start: 2020-02-26 | End: 2020-02-27 | Stop reason: HOSPADM

## 2020-02-26 RX ORDER — CITRIC ACID/SODIUM CITRATE 334-500MG
30 SOLUTION, ORAL ORAL EVERY 6 HOURS PRN
Status: DISCONTINUED | OUTPATIENT
Start: 2020-02-26 | End: 2020-02-27 | Stop reason: HOSPADM

## 2020-02-26 RX ORDER — OXYCODONE HYDROCHLORIDE AND ACETAMINOPHEN 5; 325 MG/1; MG/1
2 TABLET ORAL EVERY 4 HOURS PRN
Status: DISCONTINUED | OUTPATIENT
Start: 2020-02-26 | End: 2020-02-28 | Stop reason: HOSPADM

## 2020-02-26 RX ORDER — METHYLERGONOVINE MALEATE 0.2 MG/ML
0.2 INJECTION INTRAVENOUS
Status: DISCONTINUED | OUTPATIENT
Start: 2020-02-26 | End: 2020-02-27 | Stop reason: HOSPADM

## 2020-02-26 RX ORDER — ALUMINA, MAGNESIA, AND SIMETHICONE 2400; 2400; 240 MG/30ML; MG/30ML; MG/30ML
10 SUSPENSION ORAL 4 TIMES DAILY PRN
Status: DISCONTINUED | OUTPATIENT
Start: 2020-02-26 | End: 2020-02-28 | Stop reason: HOSPADM

## 2020-02-26 RX ORDER — ROPIVACAINE HYDROCHLORIDE 2 MG/ML
INJECTION, SOLUTION EPIDURAL; INFILTRATION; PERINEURAL
Status: COMPLETED
Start: 2020-02-26 | End: 2020-02-26

## 2020-02-26 RX ADMIN — ROPIVACAINE HYDROCHLORIDE 200 MG: 2 INJECTION, SOLUTION EPIDURAL; INFILTRATION at 15:55

## 2020-02-26 RX ADMIN — SODIUM CHLORIDE, POTASSIUM CHLORIDE, SODIUM LACTATE AND CALCIUM CHLORIDE: 600; 310; 30; 20 INJECTION, SOLUTION INTRAVENOUS at 15:34

## 2020-02-26 RX ADMIN — SODIUM CITRATE AND CITRIC ACID MONOHYDRATE 30 ML: 500; 334 SOLUTION ORAL at 17:08

## 2020-02-26 RX ADMIN — ONDANSETRON 4 MG: 2 INJECTION INTRAMUSCULAR; INTRAVENOUS at 18:49

## 2020-02-26 RX ADMIN — OXYTOCIN 2000 ML/HR: 10 INJECTION, SOLUTION INTRAMUSCULAR; INTRAVENOUS at 23:20

## 2020-02-26 RX ADMIN — FENTANYL CITRATE 100 MCG: 0.05 INJECTION, SOLUTION INTRAMUSCULAR; INTRAVENOUS at 14:28

## 2020-02-26 RX ADMIN — LIDOCAINE HYDROCHLORIDE,EPINEPHRINE BITARTRATE 3 ML: 15; .005 INJECTION, SOLUTION EPIDURAL; INFILTRATION; INTRACAUDAL; PERINEURAL at 15:50

## 2020-02-26 RX ADMIN — ALUMINUM HYDROXIDE, MAGNESIUM HYDROXIDE,SIMETHICONE 10 ML: 400; 400; 40 LIQUID ORAL at 23:50

## 2020-02-26 RX ADMIN — OXYTOCIN 2 MILLI-UNITS/MIN: 10 INJECTION, SOLUTION INTRAMUSCULAR; INTRAVENOUS at 18:41

## 2020-02-26 RX ADMIN — BUPIVACAINE HYDROCHLORIDE 10 ML: 2.5 INJECTION, SOLUTION EPIDURAL; INFILTRATION; INTRACAUDAL; PERINEURAL at 15:51

## 2020-02-26 RX ADMIN — SODIUM CHLORIDE, POTASSIUM CHLORIDE, SODIUM LACTATE AND CALCIUM CHLORIDE: 600; 310; 30; 20 INJECTION, SOLUTION INTRAVENOUS at 15:00

## 2020-02-26 SDOH — ECONOMIC STABILITY: TRANSPORTATION INSECURITY
IN THE PAST 12 MONTHS, HAS THE LACK OF TRANSPORTATION KEPT YOU FROM MEDICAL APPOINTMENTS OR FROM GETTING MEDICATIONS?: PATIENT DECLINED

## 2020-02-26 SDOH — HEALTH STABILITY: MENTAL HEALTH: HOW MANY STANDARD DRINKS CONTAINING ALCOHOL DO YOU HAVE ON A TYPICAL DAY?: 1 OR 2

## 2020-02-26 SDOH — HEALTH STABILITY: MENTAL HEALTH: HOW OFTEN DO YOU HAVE 6 OR MORE DRINKS ON ONE OCCASION?: NEVER

## 2020-02-26 SDOH — ECONOMIC STABILITY: TRANSPORTATION INSECURITY
IN THE PAST 12 MONTHS, HAS LACK OF TRANSPORTATION KEPT YOU FROM MEETINGS, WORK, OR FROM GETTING THINGS NEEDED FOR DAILY LIVING?: PATIENT DECLINED

## 2020-02-26 SDOH — ECONOMIC STABILITY: FOOD INSECURITY: WITHIN THE PAST 12 MONTHS, THE FOOD YOU BOUGHT JUST DIDN'T LAST AND YOU DIDN'T HAVE MONEY TO GET MORE.: PATIENT DECLINED

## 2020-02-26 SDOH — HEALTH STABILITY: MENTAL HEALTH: HOW OFTEN DO YOU HAVE A DRINK CONTAINING ALCOHOL?: NEVER

## 2020-02-26 SDOH — ECONOMIC STABILITY: FOOD INSECURITY: WITHIN THE PAST 12 MONTHS, YOU WORRIED THAT YOUR FOOD WOULD RUN OUT BEFORE YOU GOT MONEY TO BUY MORE.: PATIENT DECLINED

## 2020-02-26 ASSESSMENT — LIFESTYLE VARIABLES
TOTAL SCORE: 0
ON A TYPICAL DAY WHEN YOU DRINK ALCOHOL HOW MANY DRINKS DO YOU HAVE: 0
AVERAGE NUMBER OF DAYS PER WEEK YOU HAVE A DRINK CONTAINING ALCOHOL: 0
EVER HAD A DRINK FIRST THING IN THE MORNING TO STEADY YOUR NERVES TO GET RID OF A HANGOVER: NO
TOTAL SCORE: 0
EVER_SMOKED: NEVER
CONSUMPTION TOTAL: NEGATIVE
EVER FELT BAD OR GUILTY ABOUT YOUR DRINKING: NO
TOTAL SCORE: 0
ALCOHOL_USE: NO
HAVE YOU EVER FELT YOU SHOULD CUT DOWN ON YOUR DRINKING: NO
HAVE PEOPLE ANNOYED YOU BY CRITICIZING YOUR DRINKING: NO
HOW MANY TIMES IN THE PAST YEAR HAVE YOU HAD 5 OR MORE DRINKS IN A DAY: 0

## 2020-02-26 ASSESSMENT — PATIENT HEALTH QUESTIONNAIRE - PHQ9
1. LITTLE INTEREST OR PLEASURE IN DOING THINGS: NOT AT ALL
SUM OF ALL RESPONSES TO PHQ9 QUESTIONS 1 AND 2: 0
SUM OF ALL RESPONSES TO PHQ9 QUESTIONS 1 AND 2: 0
2. FEELING DOWN, DEPRESSED, IRRITABLE, OR HOPELESS: NOT AT ALL
2. FEELING DOWN, DEPRESSED, IRRITABLE, OR HOPELESS: NOT AT ALL
1. LITTLE INTEREST OR PLEASURE IN DOING THINGS: NOT AT ALL

## 2020-02-26 NOTE — PROGRESS NOTES
31yo, , edc2//, 39.6 presents with c/o UCs q5-8 min. Pt denies LOF, vag bleeding. POS fm. EFM and Siloam placed. VSS.

## 2020-02-26 NOTE — H&P
DATE OF ADMISSION:  02/26/2020    ADMISSION DIAGNOSES:  Intrauterine pregnancy at term, in labor.    HISTORY AND PHYSICAL:  This is a 30-year-old G3, P0-0-2-0, whose estimated   date of confinement is 02/27 based on her last menstrual period consistent   with a first trimester ultrasound.  She is an established patient of Dr. Lavinia Young and has received good prenatal care.  She reports contractions   beginning last night at around 2:00 a.m.  She states they have progressively   gotten worse and now are every 4-6 minutes and have become more and more   painful.  She reports good fetal movement.  She denies vaginal bleeding or   loss of fluid.    REVIEW OF SYSTEMS:  Negative for nausea, vomiting, chest pains or shortness of   breath.    PAST MEDICAL HISTORY:  None.    PAST SURGICAL HISTORY:  Tonsils and adenoids, as well as some rhinoplasty, and   ankle repair.    MEDICATIONS:  Prenatal vitamins and Zyrtec.    ALLERGIES:  SULFA, RADISHES, POSSIBLY VANCOMYCIN.    SOCIAL HISTORY:  The patient denies tobacco, ethanol or drugs.    FAMILY HISTORY:  Noncontributory.    PHYSICAL EXAMINATION:  GENERAL:  She is a well-developed, well-nourished female, in no apparent   distress.  VITAL SIGNS:  Blood pressure is 121/74, pulse 83, temperature 98.8.  HEART:  Regular rate and rhythm.  LUNGS:  Clear.  ABDOMEN:  Gravid.  EXTREMITIES:  Have 2+ pitting edema.  PELVIC:  Cervix is 2-3, 100% effaced, -1 station.  Estimated fetal weight   7-1/2 to 8 pounds.  Fetal heart tones documented in the 140s, reactive with no   decelerations.  [place] is every 4-6 minutes for contractions.    PERTINENT LABORATORY DATA:  Her blood type is A positive, antibody screen is   negative, RPR nonreactive, hepatitis negative, HIV negative, rubella is   nonimmune.  One-hour Glucola is 75.  GBS has been performed, but currently   unavailable, we will find that from our office.  The patient had a 20-week   ultrasound, which showed normal fetal anatomy with the  placenta being   anterior.    ASSESSMENT AND PLAN:  A 30-year-old  3 at 39 weeks and 6 days, in   active labor.  The patient will be admitted for pain management and expectant   management.  Anticipate   at this time.       ____________________________________     MD LD Braxton / KAL    DD:  2020 14:10:15  DT:  2020 15:21:05    D#:  [id number]  Job#:  [id number]

## 2020-02-27 LAB
ERYTHROCYTE [DISTWIDTH] IN BLOOD BY AUTOMATED COUNT: 45.1 FL (ref 35.9–50)
HCT VFR BLD AUTO: 33.3 % (ref 37–47)
HGB BLD-MCNC: 11.2 G/DL (ref 12–16)
MCH RBC QN AUTO: 31.5 PG (ref 27–33)
MCHC RBC AUTO-ENTMCNC: 33.3 G/DL (ref 33.6–35)
MCV RBC AUTO: 94.6 FL (ref 81.4–97.8)
PLATELET # BLD AUTO: 184 K/UL (ref 164–446)
PMV BLD AUTO: 11 FL (ref 9–12.9)
RBC # BLD AUTO: 3.49 M/UL (ref 4.2–5.4)
WBC # BLD AUTO: 15.9 K/UL (ref 4.8–10.8)

## 2020-02-27 PROCEDURE — 85027 COMPLETE CBC AUTOMATED: CPT

## 2020-02-27 PROCEDURE — A9270 NON-COVERED ITEM OR SERVICE: HCPCS | Performed by: OBSTETRICS & GYNECOLOGY

## 2020-02-27 PROCEDURE — 770002 HCHG ROOM/CARE - OB PRIVATE (112)

## 2020-02-27 PROCEDURE — 36415 COLL VENOUS BLD VENIPUNCTURE: CPT

## 2020-02-27 PROCEDURE — 90707 MMR VACCINE SC: CPT

## 2020-02-27 PROCEDURE — 303615 HCHG EPIDURAL/SPINAL ANESTHESIA FOR LABOR

## 2020-02-27 PROCEDURE — 700102 HCHG RX REV CODE 250 W/ 637 OVERRIDE(OP): Performed by: OBSTETRICS & GYNECOLOGY

## 2020-02-27 PROCEDURE — 90471 IMMUNIZATION ADMIN: CPT

## 2020-02-27 PROCEDURE — 700111 HCHG RX REV CODE 636 W/ 250 OVERRIDE (IP)

## 2020-02-27 PROCEDURE — 700111 HCHG RX REV CODE 636 W/ 250 OVERRIDE (IP): Performed by: OBSTETRICS & GYNECOLOGY

## 2020-02-27 PROCEDURE — 700112 HCHG RX REV CODE 229: Performed by: OBSTETRICS & GYNECOLOGY

## 2020-02-27 RX ORDER — METHYLERGONOVINE MALEATE 0.2 MG/ML
0.2 INJECTION INTRAVENOUS
Status: DISCONTINUED | OUTPATIENT
Start: 2020-02-27 | End: 2020-02-28 | Stop reason: HOSPADM

## 2020-02-27 RX ORDER — SODIUM CHLORIDE, SODIUM LACTATE, POTASSIUM CHLORIDE, CALCIUM CHLORIDE 600; 310; 30; 20 MG/100ML; MG/100ML; MG/100ML; MG/100ML
INJECTION, SOLUTION INTRAVENOUS PRN
Status: DISCONTINUED | OUTPATIENT
Start: 2020-02-27 | End: 2020-02-28 | Stop reason: HOSPADM

## 2020-02-27 RX ORDER — CARBOPROST TROMETHAMINE 250 UG/ML
250 INJECTION, SOLUTION INTRAMUSCULAR
Status: DISCONTINUED | OUTPATIENT
Start: 2020-02-27 | End: 2020-02-28 | Stop reason: HOSPADM

## 2020-02-27 RX ORDER — VITAMIN A ACETATE, BETA CAROTENE, ASCORBIC ACID, CHOLECALCIFEROL, .ALPHA.-TOCOPHEROL ACETATE, DL-, THIAMINE MONONITRATE, RIBOFLAVIN, NIACINAMIDE, PYRIDOXINE HYDROCHLORIDE, FOLIC ACID, CYANOCOBALAMIN, CALCIUM CARBONATE, FERROUS FUMARATE, ZINC OXIDE, CUPRIC OXIDE 3080; 12; 120; 400; 1; 1.84; 3; 20; 22; 920; 25; 200; 27; 10; 2 [IU]/1; UG/1; MG/1; [IU]/1; MG/1; MG/1; MG/1; MG/1; MG/1; [IU]/1; MG/1; MG/1; MG/1; MG/1; MG/1
1 TABLET, FILM COATED ORAL EVERY MORNING
Status: DISCONTINUED | OUTPATIENT
Start: 2020-02-27 | End: 2020-02-28 | Stop reason: HOSPADM

## 2020-02-27 RX ORDER — MISOPROSTOL 200 UG/1
600 TABLET ORAL
Status: DISCONTINUED | OUTPATIENT
Start: 2020-02-27 | End: 2020-02-28 | Stop reason: HOSPADM

## 2020-02-27 RX ORDER — DOCUSATE SODIUM 100 MG/1
100 CAPSULE, LIQUID FILLED ORAL 2 TIMES DAILY PRN
Status: DISCONTINUED | OUTPATIENT
Start: 2020-02-27 | End: 2020-02-28 | Stop reason: HOSPADM

## 2020-02-27 RX ADMIN — VITAMIN A, VITAMIN C, VITAMIN D, VITAMIN E, THIAMINE, RIBOFLAVIN, NIACIN, VITAMIN B6, FOLIC ACID, VITAMIN B12, CALCIUM, IRON, ZINC, COPPER 1 TABLET: 4000; 120; 400; 22; 1.84; 3; 20; 10; 1; 12; 200; 27; 25; 2 TABLET ORAL at 07:16

## 2020-02-27 RX ADMIN — IBUPROFEN 600 MG: 600 TABLET ORAL at 02:07

## 2020-02-27 RX ADMIN — DOCUSATE SODIUM 100 MG: 100 CAPSULE, LIQUID FILLED ORAL at 21:13

## 2020-02-27 RX ADMIN — IBUPROFEN 600 MG: 600 TABLET ORAL at 12:07

## 2020-02-27 RX ADMIN — OXYTOCIN 125 ML/HR: 10 INJECTION, SOLUTION INTRAMUSCULAR; INTRAVENOUS at 00:00

## 2020-02-27 RX ADMIN — MEASLES, MUMPS, AND RUBELLA VIRUS VACCINE LIVE 0.5 ML: 1000; 12500; 1000 INJECTION, POWDER, LYOPHILIZED, FOR SUSPENSION SUBCUTANEOUS at 11:59

## 2020-02-27 RX ADMIN — IBUPROFEN 600 MG: 600 TABLET ORAL at 21:12

## 2020-02-27 ASSESSMENT — EDINBURGH POSTNATAL DEPRESSION SCALE (EPDS)
I HAVE LOOKED FORWARD WITH ENJOYMENT TO THINGS: AS MUCH AS I EVER DID
I HAVE BEEN ABLE TO LAUGH AND SEE THE FUNNY SIDE OF THINGS: AS MUCH AS I ALWAYS COULD
THE THOUGHT OF HARMING MYSELF HAS OCCURRED TO ME: NEVER
THINGS HAVE BEEN GETTING ON TOP OF ME: NO, I HAVE BEEN COPING AS WELL AS EVER
I HAVE BEEN ANXIOUS OR WORRIED FOR NO GOOD REASON: NO, NOT AT ALL
I HAVE FELT SAD OR MISERABLE: NO, NOT AT ALL
I HAVE BEEN SO UNHAPPY THAT I HAVE BEEN CRYING: NO, NEVER
I HAVE BLAMED MYSELF UNNECESSARILY WHEN THINGS WENT WRONG: NOT VERY OFTEN
I HAVE BEEN SO UNHAPPY THAT I HAVE HAD DIFFICULTY SLEEPING: NOT AT ALL
I HAVE FELT SCARED OR PANICKY FOR NO GOOD REASON: NO, NOT AT ALL

## 2020-02-27 ASSESSMENT — PAIN SCALES - GENERAL: PAIN_LEVEL: 3

## 2020-02-27 NOTE — ANESTHESIA TIME REPORT
Anesthesia Start and Stop Event Times     Date Time Event    2/26/2020 1508 Ready for Procedure     1538 Anesthesia Start     2315 Anesthesia Stop        Responsible Staff  02/26/20    Name Role Begin End    Cole Hampton III, M.D. Anesth [id number]        Preop Diagnosis (Free Text):  Pre-op Diagnosis             Preop Diagnosis (Codes):    Post op Diagnosis  Pregnancy      Premium Reason  A. 3PM - 7AM    Comments:

## 2020-02-27 NOTE — PROGRESS NOTES
: Report received from FRANKI Price. POC discussed.  : Dr Brock at bedside, SVE: C/0, labor down for 30 mins and begin pushing.  : Pushing initated  :  of viable female infant, 7/9 apgars, infant over to warmer for further resuscitation with RT and transition RN.  0120: Pt up to bathroom, unable to void, education given on bleeding and bradley-bottle, pt transferred to S326, report given to FRANKI Carbajal.POC discussed.

## 2020-02-27 NOTE — ANESTHESIA PROCEDURE NOTES
Epidural Block  Date/Time: 2/26/2020 3:47 PM  Performed by: Cole Hampton III, M.D.  Authorized by: Cole Hampton III, M.D.     Patient Location:  OB  Start Time:  2/26/2020 3:47 PM  End Time:  2/26/2020 3:51 PM  Reason for Block: labor analgesia    patient identified, IV checked, site marked, risks and benefits discussed, surgical consent, monitors and equipment checked, pre-op evaluation and timeout performed    Patient Position:  Sitting  Prep: ChloraPrep, patient draped and sterile technique    Monitoring:  Blood pressure, continuous pulse oximetry and heart rate  Approach:  Midline  Location:  L3-L4  Injection Technique:  DUC air  Skin infiltration:  Lidocaine  Strength:  1%  Dose:  3ml  Needle Type:  Tuohy  Needle Gauge:  17 G  Needle Length:  3.5 in  Loss of resistance::  9  Catheter Size:  19 G  Catheter at Skin Depth:  16  Test Dose:  Lidocaine 1.5% with epinephrine 1-to-200,000

## 2020-02-27 NOTE — CARE PLAN
Problem: Infection  Goal: Will remain free from infection  Intervention: Assess signs and symptoms of infection  Note: Pt remains afebrile, no other s/s of infection noted. Will continue to monitor.     Problem: Knowledge Deficit  Goal: Knowledge of disease process/condition, treatment plan, diagnostic tests, and medications will improve  Intervention: Assess knowledge level of disease process/condition, treatment plan, diagnostic tests, and medications  Note: Pt updated on POC, when the next SVE will be, etc. Will continue to monitor.

## 2020-02-27 NOTE — PROGRESS NOTES
0135: Received patient from labor and delivery via wheelchair with Kim DOAN.  Assessment done. Fundus firm. Lochia light. Pt has not yet voided post delivery. Instructed patient to increase fluid intake and to void as needed and to watch for increased bleeding. Patient states feeling cramping. Will give pt Motrin. Baby is in the NBN for desaturations. Pt wants to go to NBN to see baby. Wheelchair in use to transport pt to NBN.

## 2020-02-27 NOTE — L&D DELIVERY NOTE
DATE OF SERVICE:  02/26/2020    PROCEDURE PERFORMED:  Normal spontaneous vaginal delivery with repair of   midline laceration.    PROCEDURE IN DETAIL:  This is a 30-year-old G1 who was admitted in active   labor.  She is an established patient of Dr. Lavinia Young.  She was admitted   for active labor and she progressed with Pitocin augmentation and artificial   rupture of membranes to complete-complete and a +2 station.  The patient   pushed until the head was delivered over an intact perineum with a   second-degree midline laceration.  The head was delivered without difficulty.    The anterior and posterior shoulders were delivered with the rest of body to   follow.  There was no nuchal cord.  After approximately 1 minute, the cord was   clamped x2 and cut, and the infant handed to awaiting nursing staff.  This   was a female infant with Apgars of 7 and 8, weight is currently pending.  The   placenta delivered spontaneously, Silva intact with a 3-vessel cord.  The   midline second-degree laceration was repaired with 2-0 chromic in a standard   fashion.  The estimated blood loss was 100 mL.  The mother is to recover in   labor and delivery and the infant will be transferred to the well baby   nursery.       ____________________________________     MD LD Braxton / KAL    DD:  02/27/2020 00:28:11  DT:  02/27/2020 09:45:36    D#:  [id number]  Job#:  [id number]

## 2020-02-27 NOTE — ANESTHESIA POSTPROCEDURE EVALUATION
Patient: Dusty [place] Melanaphy    Procedure Summary     Date:  02/26/20 Room / Location:      Anesthesia Start:  1538 Anesthesia Stop:  2315    Procedure:  Labor Epidural Diagnosis:      Scheduled Providers:   Responsible Provider:  Cole Hampton III, M.D.    Anesthesia Type:  epidural ASA Status:  3          Final Anesthesia Type: epidural  Last vitals  BP   Blood Pressure: 119/78    Temp   37.6 °C (99.7 °F)    Pulse   Pulse: 93   Resp   18    SpO2   94 %      Anesthesia Post Evaluation    Patient location during evaluation: PACU  Patient participation: complete - patient participated  Level of consciousness: awake and alert  Pain score: 3    Airway patency: patent  Anesthetic complications: no  Cardiovascular status: hemodynamically stable  Respiratory status: acceptable  Hydration status: euvolemic    PONV: none           Nurse Pain Score: 3 (NPRS)

## 2020-02-27 NOTE — ANESTHESIA QCDR
2019 [facility] Clinical Data Registry (for Quality Improvement)     Postoperative nausea/vomiting risk protocol (Adult = 18 yrs and Pediatric 3-17 yrs)- (430 and 463)  General inhalation anesthetic (NOT TIVA) with PONV risk factors: No  Provision of anti-emetic therapy with at least 2 different classes of agents: N/A  Patient DID NOT receive anti-emetic therapy and reason is documented in Medical Record: N/A    Multimodal Pain Management- (477)  Non-emergent surgery AND patient age >= 18: No  Use of Multimodal Pain Management, two or more drugs and/or interventions, NOT including systemic opioids:   Exception: Documented allergy to multiple classes of analgesics:     Smoking Abstinence (404)  Patient is current smoker (cigarette, pipe, e-cig, marijuanna): No  Elective Surgery:   Abstinence instructions provided prior to day of surgery:   Patient abstained from smoking on day of surgery:     Pre-Op Beta-Blocker in Isolated CABG (44)  Isolated CABG AND patient age >= 18: No  Beta-blocker admin within 24 hours of surgical incision:   Exception:of medical reason(s) for not administering beta blocker within 24 hours prior to surgical incision (e.g., not  indicated,other medical reason):     PACU assessment of acute postoperative pain prior to Anesthesia Care End- Applies to Patients Age = 18- (ABG7)  Initial PACU pain score is which of the following: < 7/10  Patient unable to report pain score: N/A    Post-anesthetic transfer of care checklist/protocol to PACU/ICU- (426 and 427)  Upon conclusion of case, patient transferred to which of the following locations: PACU/Non-ICU  Use of transfer checklist/protocol: Yes  Exclusion: Service Performed in Patient Hospital Room (and thus did not require transfer): N/A  Unplanned admission to ICU related to anesthesia service up through end of PACU care- (MD51)  Unplanned admission to ICU (not initially anticipated at anesthesia start time): No

## 2020-02-27 NOTE — ANESTHESIA PREPROCEDURE EVALUATION
Relevant Problems   No relevant active problems       Physical Exam    Airway   Mallampati: III  TM distance: >3 FB  Neck ROM: full       Cardiovascular - normal exam  Rhythm: regular  Rate: normal  (-) murmur     Dental - normal exam         Pulmonary - normal exam  Breath sounds clear to auscultation     Abdominal   (+) obese     Neurological - normal exam         Other findings: Morbid obesity, term parturient in labor            Anesthesia Plan    ASA 3   ASA physical status 3 criteria: morbid obesity - BMI greater than or equal to 40    Plan - epidural   Neuraxial block will be labor analgesia              Pertinent diagnostic labs and testing reviewed    Informed Consent:    Anesthetic plan and risks discussed with patient.

## 2020-02-27 NOTE — CARE PLAN
Problem: Altered physiologic condition related to immediate post-delivery state and potential for bleeding/hemorrhage  Goal: Patient physiologically stable as evidenced by normal lochia, palpable uterine involution and vital signs within normal limits  Outcome: PROGRESSING AS EXPECTED  Note: Fundus firm, lochia light. Reviewed lochia changes with the pt.      Problem: Alteration in comfort related to episiotomy, vaginal repair and/or after birth pains  Goal: Patient verbalizes acceptable pain level  Outcome: PROGRESSING AS EXPECTED  Note: Reviewed 0-10 pain scale and available pain medication with the pt.

## 2020-02-27 NOTE — PROGRESS NOTES
1355- Received report from CHRISTINA Hodge RN. Pt transferred to labor room. Pt requesting epidural. Fentanyl given per MAR. Dr. Hampton in OR at this time.     1540- Dr. Hampton to bedside. Epidural placed without complications.     1640- SVE as noted. Pt placed on peanut ball.     1657- Dr. Brock to bedside. AROM=light meconium. SVE as noted. IUPC placed. Pt educated. Bolus button used.     1750- Dr. Hampton to bedside for bolus. Orders to change rate to 12mL/hr.     1800- prolonged deceleration noted. Pt repositioned.     1810- Pt states she is feeling better.     1830- Received orders from Dr. Brock to start pitocin. Started per MAR.     1900- Report to ZAKIYA Mancilla RN. POC discussed.

## 2020-02-28 VITALS
BODY MASS INDEX: 44.42 KG/M2 | OXYGEN SATURATION: 97 % | TEMPERATURE: 97.5 F | RESPIRATION RATE: 18 BRPM | WEIGHT: 283 LBS | SYSTOLIC BLOOD PRESSURE: 125 MMHG | HEIGHT: 67 IN | HEART RATE: 80 BPM | DIASTOLIC BLOOD PRESSURE: 76 MMHG

## 2020-02-28 PROCEDURE — 700102 HCHG RX REV CODE 250 W/ 637 OVERRIDE(OP): Performed by: OBSTETRICS & GYNECOLOGY

## 2020-02-28 PROCEDURE — A9270 NON-COVERED ITEM OR SERVICE: HCPCS | Performed by: OBSTETRICS & GYNECOLOGY

## 2020-02-28 RX ORDER — OXYCODONE HYDROCHLORIDE AND ACETAMINOPHEN 5; 325 MG/1; MG/1
1 TABLET ORAL EVERY 4 HOURS PRN
Qty: 8 TAB | Refills: 0 | Status: SHIPPED | OUTPATIENT
Start: 2020-02-28 | End: 2020-03-06

## 2020-02-28 RX ORDER — IBUPROFEN 600 MG/1
600 TABLET ORAL EVERY 6 HOURS PRN
Qty: 30 TAB | Refills: 1 | Status: SHIPPED | OUTPATIENT
Start: 2020-02-28 | End: 2021-08-05

## 2020-02-28 RX ORDER — PSEUDOEPHEDRINE HCL 30 MG
100 TABLET ORAL 2 TIMES DAILY PRN
Qty: 60 CAP | Refills: 1 | Status: SHIPPED | OUTPATIENT
Start: 2020-02-28 | End: 2021-08-05

## 2020-02-28 RX ORDER — OXYCODONE HYDROCHLORIDE AND ACETAMINOPHEN 5; 325 MG/1; MG/1
1 TABLET ORAL EVERY 4 HOURS PRN
Qty: 15 TAB | Refills: 0 | Status: SHIPPED | OUTPATIENT
Start: 2020-02-28 | End: 2020-02-28

## 2020-02-28 RX ADMIN — IBUPROFEN 600 MG: 600 TABLET ORAL at 11:27

## 2020-02-28 RX ADMIN — OXYCODONE HYDROCHLORIDE AND ACETAMINOPHEN 1 TABLET: 5; 325 TABLET ORAL at 11:27

## 2020-02-28 RX ADMIN — OXYCODONE HYDROCHLORIDE AND ACETAMINOPHEN 1 TABLET: 5; 325 TABLET ORAL at 18:18

## 2020-02-28 RX ADMIN — IBUPROFEN 600 MG: 600 TABLET ORAL at 05:08

## 2020-02-28 RX ADMIN — OXYCODONE HYDROCHLORIDE AND ACETAMINOPHEN 1 TABLET: 5; 325 TABLET ORAL at 05:10

## 2020-02-28 RX ADMIN — IBUPROFEN 600 MG: 600 TABLET ORAL at 18:18

## 2020-02-28 NOTE — LACTATION NOTE
followup visit per MOB request. Baby swaddled and not showing cues, but MOB reports she was beginning to wake. Had MOB unswaddle baby and discussed importance of unswaddling when breastfeeding so baby is in closer proximity to MOB to facilitate a deeper latch. Baby rooting immediately as she is unswaddled. MOB places baby at right breast in football hold and baby attempting to latch. I encouraged MOB to place baby in a nipple to nose position so she lifts her chin and baby immediately gets a deep latch. 4-5:1 suck/swallows observed.  MOB states her nipples are tender, but she reports suckling feels like tugs, not pinch or burn sensation at nipple. Encouraged to use lanolin or coconut oil on nipples after air drying if she desires. Reviewed pacifier guidelines and MOB reports she used one last evening when baby was unsettled and not wanting breast, but crying. I discussed with MOB that sucking is soothing and relaxing for baby and careful pacifier use, after feeding needs are met is appropriate. Discussed normal growth spurts to expect every 2-3 weeks for the next 2-3 months.

## 2020-02-28 NOTE — PROGRESS NOTES
Pt reports increased pain and soreness overnight, reports pain level 8/10, medicated with ibuprofen and percocet per pt request, pt also requests prenatal vitamin to be given with breakfast this morning. Encouraged pt to rest between feedings and ambulate when pain medication peaks. Pt verbalizes understanding. Jennifer Lopez R.N.

## 2020-02-28 NOTE — CARE PLAN
Problem: Alteration in comfort related to episiotomy, vaginal repair and/or after birth pains  Goal: Patient verbalizes acceptable pain level  Outcome: PROGRESSING AS EXPECTED  Intervention: Assess effectiveness of pain meds within 1 hour of administration  Note: Pt reports good pain relief with ibuprofen, able to ambulate and care for self and infant without difficulty     Problem: Potential knowledge deficit related to lack of understanding of self and  care  Goal: Patient will verbalize understanding of self and infant care  Outcome: PROGRESSING AS EXPECTED  Note: Education provided regarding  care, breastfeeding, self care after vaginal delivery, burping and diapering infant, questions answered, encouraged parents to download Injoy ysabel previously given as a valuable resource for information, pt verbalizesunderstanding

## 2020-02-28 NOTE — DISCHARGE SUMMARY
Discharge Summary:      Dusty Lambert    Admit Date:   2020  Discharge Date:  2020     Admitting diagnosis:  Pregnancy  Labor and delivery, indication for care  Spontaneous labor  Discharge Diagnosis: Status post vaginal, spontaneous.  Pregnancy Complications: none  Tubal Ligation:  no        History:  Past Medical History:   Diagnosis Date   • Cervical dysplasia    • Migraine    • Thyroid nodule     small left lobe nodule     OB History    Para Term  AB Living   3 1 1 0 2 1   SAB TAB Ectopic Molar Multiple Live Births   0 0 0 0 0 1      # Outcome Date GA Lbr Petey/2nd Weight Sex Delivery Anes PTL Lv   3 Term 20 39w6d / 01:55 3.94 kg (8 lb 11 oz) F Vag-Spont EPI N LÁZARO   2 AB            1 AB                 Radish [raphanus sativus]; Sulfa drugs; Vancomycin; Other food; Other food; and Sulfa drugs  Patient Active Problem List    Diagnosis Date Noted   • Meningitis 2018   • Miscarriage 2018   • Class 1 obesity due to excess calories without serious comorbidity with body mass index (BMI) of 32.0 to 32.9 in adult 2018   • Restless leg syndrome 2015   • Family planning 2014   • Thyroid nodule 2013        Hospital Course:   30 y.o. , now para 1, was admitted with the above mentioned diagnosis, underwent Active Labor, vaginal, spontaneous. Patient postpartum course was unremarkable, with progressive advancement in diet , ambulation and toleration of oral analgesia. Patient without complaints today and desires discharge.      Vitals:    20 0004 20 0142 20 1800 20 0600   BP: 122/61 119/78 136/85 121/81   Pulse: 93 93 97 88   Resp:  18 20 16   Temp:  37.6 °C (99.7 °F) 36.8 °C (98.3 °F) 36.6 °C (97.8 °F)   TempSrc:  Temporal Temporal Temporal   SpO2:  94% 94% 94%   Weight:       Height:           Current Facility-Administered Medications   Medication Dose   • LR infusion     • PRN oxytocin (PITOCIN) (20 Units/1000 mL) PRN  for excessive uterine bleeding - See Admin Instr  125-999 mL/hr   • miSOPROStol (CYTOTEC) tablet 600 mcg  600 mcg   • methylergonovine (METHERGINE) injection 0.2 mg  0.2 mg   • carboPROST (HEMABATE) injection 250 mcg  250 mcg   • docusate sodium (COLACE) capsule 100 mg  100 mg   • prenatal plus vitamin (STUARTNATAL 1+1) 27-1 MG tablet 1 Tab  1 Tab   • oxytocin (PITOCIN) infusion (for postpartum)   mL/hr   • ibuprofen (MOTRIN) tablet 600 mg  600 mg   • oxyCODONE-acetaminophen (PERCOCET) 5-325 MG per tablet 1 Tab  1 Tab   • oxyCODONE-acetaminophen (PERCOCET) 5-325 MG per tablet 2 Tab  2 Tab   • ondansetron (ZOFRAN) syringe/vial injection 4 mg  4 mg   • mag hydrox-al hydrox-simeth (MAALOX PLUS ES or MYLANTA DS) suspension 10 mL  10 mL       Exam:  Gen: NAD, AAO  Abdomen: Abdomen soft, non-tender. No masses,  No organomegally, FF @ U-1. No rebound/guarding.  Fundus Tender: No  Incision: none  Extremity: 2+ edema, no redness or tenderness in the calves or thighs, 2+DPP, Homans sign is negative, no sign of DVT       Labs:  Recent Labs     02/26/20  1425 02/27/20  0827   WBC 13.3* 15.9*   RBC 4.54 3.49*   HEMOGLOBIN 14.4 11.2*   HEMATOCRIT 43.2 33.3*   MCV 95.2 94.6   MCH 31.7 31.5   MCHC 33.3* 33.3*   RDW 46.2 45.1   PLATELETCT 228 184   MPV 11.6 11.0        Activity:   Discharge to home  Pelvic Rest x 6 weeks    Assessment:  normal postpartum course  Discharge Assessment: No heavy bleeding or foul vaginal discharge      Follow up: 6wk with Lavinia Young M.D.      Discharge Meds:   Current Outpatient Medications   Medication Sig Dispense Refill   • docusate sodium 100 MG Cap Take 100 mg by mouth 2 times a day as needed for Constipation. 60 Cap 1   • ibuprofen (MOTRIN) 600 MG Tab Take 1 Tab by mouth every 6 hours as needed. 30 Tab 1   • oxyCODONE-acetaminophen (PERCOCET) 5-325 MG Tab Take 1 Tab by mouth every four hours as needed for up to 7 days. 8 Tab 0       Sajan Hendrickson M.D.

## 2020-02-28 NOTE — LACTATION NOTE
"Initial visit. P1 . 39 .  MOB reports baby is waking independently and latching on cue. MOB reports her nipples feel tender, nipples intact bilaterally but appear reddened and tender, right more than left.  I reviewed normal feeding frequency for first 2-5 days, inlcuding cluster feeding. MOB reports baby cluster fed last night. I discussed with MOB when to expect milk to become more plentiful and how to manage engorgement with frequent feedings, hand expression or pumping, warm and cool packs. KONRAD states she learned how to hand express after baby was born when baby was in nursery for a few hours.  Discussed normal feeding frequency of first 6-8 weeks and the \"4th trimester\" concept. KONRAD has questions regarding using her personal pump. KONRAD denies medical history of thyroid disease, diabetes, PCOS or HTN. She does state that it took a few years to become pregnant. Breasts appear wide spaced, MOB reports she did not have significant breast growth, but did notice areolar darkening. I gave her the injoy ysabel and encouraged followup at breastfeeding medicine clinic- info provided. MOB instructed to call when baby again cues or latches for a latch assessment.   "

## 2020-02-29 NOTE — DISCHARGE INSTRUCTIONS
POSTPARTUM DISCHARGE INSTRUCTIONS FOR MOM    YOB: 1990   Age: 30 y.o.               Admit Date: 2020     Discharge Date: 2020  Attending Doctor:  Lavinia Young M.D.                  Allergies:  Radish [raphanus sativus]; Sulfa drugs; Vancomycin; Other food; Other food; and Sulfa drugs    Discharged to home by car. Discharged via wheelchair, hospital escort: Yes.  Special equipment needed: Not Applicable  Belongings with: Personal  Be sure to schedule a follow-up appointment with your primary care doctor or any specialists as instructed.     Discharge Plan:   Diet Plan: Discussed  Activity Level: Discussed  Confirmed Follow up Appointment: Patient to Call and Schedule Appointment  Confirmed Symptoms Management: Discussed  Medication Reconciliation Updated: Yes  Influenza Vaccine Indication: Not indicated: Previously immunized this influenza season and > 8 years of age    REASONS TO CALL YOUR OBSTETRICIAN:  1.   Persistent fever or shaking chills (Temperature higher than 100.4)  2.   Heavy bleeding (soaking more than 1 pad per hour); Passing clots  3.   Foul odor from vagina  4.   Mastitis (Breast infection; breast pain, chills, fever, redness)  5.   Urinary pain, burning or frequency  6.   Episiotomy infection  7.   Abdominal incision infection  8.   Severe depression longer than 24 hours    HAND WASHING  · Prior to handling the baby.  · Before breastfeeding or bottle feeding baby.  · After using the bathroom or changing the baby's diaper.    WOUND CARE  Ask your physician for additional care instructions.  In general:    ·  Incision:      · Keep clean and dry.    · Do NOT lift anything heavier than your baby for up to 6 weeks.    · There should not be any opening or pus.      VAGINAL CARE  · Nothing inside vagina for 6 weeks: no sexual intercourse, tampons or douching.  · Bleeding may continue for 2-4 weeks.  Amount may vary.    · Call your physician for heavy bleeding which means  "soaking more than 1 pad per hour    BIRTH CONTROL  · It is possible to become pregnant at any time after delivery and while breastfeeding.  · Plan to discuss a method of birth control with your physician at your follow up visit. visit.    DIET AND ELIMINATION  · Eating more fiber (bran cereal, fruits, and vegetables) and drinking plenty of fluids will help to avoid constipation.  · Urinary frequency after childbirth is normal.    POSTPARTUM BLUES  During the first few days after birth, you may experience a sense of the \"blues\" which may include impatience, irritability or even crying.  These feeling come and go quickly.  However, as many as 1 in 10 women experience emotional symptoms known as postpartum depression.    Postpartum depression:  May start as early as the second or third day after delivery or take several weeks or months to develop.  Symptoms of \"blues\" are present, but are more intense:  Crying spells; loss of appetite; feelings of hopelessness or loss of control; fear of touching the baby; over concern or no concern at all about the baby; little or no concern about your own appearance/caring for yourself; and/or inability to sleep or excessive sleeping.  Contact your physician if you are experiencing any of these symptoms.    Crisis Hotline:  · [place] Crisis Hotline:  1-537-DCIJUQR  Or 1-478.788.2111  · [place] Crisis Hotline:  1-779.549.5700  Or 857-480-1780    PREVENTING SHAKEN BABY:  If you are angry or stressed, PUT THE BABY IN THE CRIB, step away, take some deep breaths, and wait until you are calm to care for the baby.  DO NOT SHAKE THE BABY.  You are not alone, call a supporter for help.    · Crisis Call Center 24/7 crisis line 426-319-0862 or 1-174.250.3019  · You can also text them, text \"ANSWER\" to 908360    QUIT SMOKING/TOBACCO USE:  I understand the use of any tobacco products increases my chance of suffering from future heart disease and could cause other illnesses which may shorten my " life. Quitting the use of tobacco products is the single most important thing I can do to improve my health. For further information on smoking / tobacco cessation call a Toll Free Quit Line at 1-427.947.2735 (*National [employer] [place]) or 1-558.319.9494 (American Lung Association) or you can access the web based program at www.lungusa.org.    · [place] Tobacco Users Help Line:  (559) 707-8876       Toll Free: 1-507.688.8619  · Quit Tobacco Program [facility] (797)328-7308    DEPRESSION / SUICIDE RISK:  As you are discharged from this [facility], it is important to learn how to keep safe from harming yourself.    Recognize the warning signs:  · Abrupt changes in personality, positive or negative- including increase in energy   · Giving away possessions  · Change in eating patterns- significant weight changes-  positive or negative  · Change in sleeping patterns- unable to sleep or sleeping all the time   · Unwillingness or inability to communicate  · Depression  · Unusual sadness, discouragement and loneliness  · Talk of wanting to die  · Neglect of personal appearance   · Rebelliousness- reckless behavior  · Withdrawal from people/activities they love  · Confusion- inability to concentrate     If you or a loved one observes any of these behaviors or has concerns about self-harm, here's what you can do:  · Talk about it- your feelings and reasons for harming yourself  · Remove any means that you might use to hurt yourself (examples: pills, rope, extension cords, firearm)  · Get professional help from the community (Mental Health, Substance Abuse, psychological counseling)  · Do not be alone:Call your Safe Contact- someone whom you trust who will be there for you.  · Call your local CRISIS HOTLINE 974-9483 or 625-275-1323  · Call your local Children's Mobile Crisis Response Team [place] (529) 702-3532 or www.[employer]  · Call the toll free National Suicide Prevention  Hotlines   · National Suicide Prevention Lifeline 784-224-HYNW (1061)  · National Hope Line Network 800-SUICIDE (600-8437)    DISCHARGE SURVEY:  Thank you for choosing [facility].  We hope we provided you with very good care.  You may be receiving a survey in the mail.  Please fill it out.  Your opinion is valuable to us.    ADDITIONAL EDUCATIONAL MATERIALS GIVEN TO PATIENT:        My signature on this form indicates that:  1.  I have reviewed and understand the above information  2.  My questions regarding this information have been answered to my satisfaction.  3.  I have formulated a plan with my discharge nurse to obtain my prescribed medication for home.

## 2020-03-06 ENCOUNTER — OFFICE VISIT (OUTPATIENT)
Dept: OBGYN | Facility: CLINIC | Age: 30
End: 2020-03-06
Payer: COMMERCIAL

## 2020-03-06 DIAGNOSIS — O92.29 SORE NIPPLES DUE TO LACTATION: ICD-10-CM

## 2020-03-06 DIAGNOSIS — Z91.89 AT RISK FOR POSTPARTUM DEPRESSION: ICD-10-CM

## 2020-03-06 DIAGNOSIS — O92.79 LACTATION DISORDER, POSTPARTUM CONDITION: ICD-10-CM

## 2020-03-06 DIAGNOSIS — O92.5 LACTATION SUPPRESSION: ICD-10-CM

## 2020-03-06 PROCEDURE — 99205 OFFICE O/P NEW HI 60 MIN: CPT | Performed by: NURSE PRACTITIONER

## 2020-03-07 NOTE — PROGRESS NOTES
Subjective:       Dusty Lambert is a 30 y.o. female here to establish lactation care. She is here today with baby.    Concerns:   Latch on difficulties , nipple pain , feeling that there is not enough milk  and sleepy baby Fearful that if she uses formula baby will not breast-feed    HPI:   Pertinent  history:   Mother does not have a history of advanced maternal age, GDM, hypertension prior to pregnancy, insulin resistance, multiple gestation, PCOS and thyroid disease. These are common conditions which may interfere in establishing milk supply.  Breast changes in pregnancy: Yes no from mom, yes from dad  History of breast surgeries: No      FEEDING HISTORY:    Past breastfeeding history:  first baby to breastfeed  Hospital course: Initial difficulties, worked with lactation, sore nipples.    Currently: Saw pediatrician day 5 and started on BiliBlanket, started pumping with Medela pump and style, fed back pumped milk and started formula.    Both breasts: Yes      Supplement: Expressed breast milk and Formula  Quanity: 30ml after some feedings  How given/devices:  Bottle    Nipple Shield Use: None    Breast Pumping:     Frequency: Whe she can with an 11 month old  Type of pump: Medela PNS  Flange size/type: wrmm  NO pain with pumping, suction at max    ROS:  Constitutional:   no fever, chills. Feeling well  Gastrointestinal:  Negative for nausea, vomiting  Breasts: No soreness of breasts and Soreness of nipples  Psychiatric: Experiencing anxiety and Managing ok  Mental Health:  NOT Feeling irritable, agitated, angry, overwhelmed, apathy, exhaustion  NOT having sleep changes, appetite changes       Objective:     General: no acute distress  Neurological:  Alert and oriented x3  Breasts: Symetrical , Soft, Plugged Duct - no evidence and Mastitis  - no S/S   Wider spaced breast, minimal changes. Sufficient tissue superiorly.  Nipples: intact  Psychiatric:  Normal mood and affect. Her behavior is  normal. Judgment and thought content normal   Mental Health:  Feeling overwhelmed, agitation, hypervigilance NOT exhibited  Sadness and crying situational and appropriate   Assessment/Plan & Lactation Counseling:     Infant Weight History:   2/26/20 8#11.0oz  3/6/20 Day 9 8#2.2oz 0.5oz gain per day from ped visit 2 days ago  Infant intake at Breast:: L 0.4oz     R 0.4oz    Total 0.8oz  Milk Transfer at this feeding:   Effective breastfeeding  Pumped: Type of Pump: Hospital grade    Quantity Pumped: L 1ml    R 3ml    Total 4ml  Initiation of Feeding: Infant initiates  Position of Feeding:    Right: cross cradle  Left: cross cradle  Attachment Achieved: rapidly  Nipple shield: N/A      Suck Pattern at the breast: Suck burst and normal rest  Suck Pattern on the bottle: Suck burst and normal rest  Behavior Following Observed Feeding: fussy  Nipple Pain from: Contact forces of the tongue causing nipple strain resulting in damage and Nipple damage from accumulated microtrauma which lowered failure strength resulting in sudden damage     Latch: Mom latches independently assisted latch for deeper latch.  Suckling/Feeding: attaches, baby fed effectively, elicits CORRIE and rhythmic  Milk Supply Available: low  Low milk supply:   Likely due to: delay in lactogenesis II, ineffective or infrequent breast stimulation or milk removal and possible breast hypoplasia    Diagnosis/Problem  Lactation suppression  At risk for postpartum depression  Sore nipples due to lactation  Lactation disorder postpartum condition of shallow latch      Discussed concerns and symptoms as listed above in assessment and guidance summarized below.  Topics reviewed included:  •  Herbs and medications for increasing supply and their potential side effects and efficacy. No evidence base exists to support their use  •  The nature of infants oral structure infants digital finger gloved exam shows good cupping and extension over the gumline.  No lingual  frenulum noted.  Observed and evaluated labial frenulum which is very vascular and easy to lift.  This is not interfering with latch  •  Triple feeding and its sustainability and its impact on the mother baby relationship  •  Maternal Mental Health: Discussed new mothering in encountering terror with the task to keep her baby alive, the alarming realness and sometimes feeling mechanical, the pereira of the heart and soul with the struggle to survive and there is revovery, but it is a journey and process.  Encouraged  sleep and eat at a minimum. Grief is normal. Encouraged to reach out, resources provided  •  Sleep or lack of and strategies to manage restorative sleep, although short amounts, significant to the mental health of the mother  •  Milk supply is dependent on how many times the baby removes milk and how well the breasts are emptied in a 24 hour period. This is a biological reality that we can't work around. If, for any reason, your baby is not latching, or you are not able to nurse, then it is important for you to remove the milk instead by pumping or hand expression.  There's no magic trick, tea, cookie or supplement that will maintain your milk supply. One  must  effectively remove milk to continue to make and maximize milk. In the early days and weeks that can be 8+ times in 24 hours. For older babies, on average 6-7 + times in 24 hours.    • To increase milk supply mom will stimulate the breast 8-10 times per day either with the baby at both breasts deeply and no nipple pain or using a hospital grade double electric pump.  •  Feeding: Feed your baby every 2-3 hours, more often if baby acts hungry. Awaken baby for feeding if going over 3 hours in the day. Need to get in 8-10 feedings per 24 hours.   • Offer an additional 6 ounces of pumped milk or formula per 24 hours distributed as you are reading this baby well  •  Given infants weight you should NOT allow baby to go longer at night  Positioning  "Techniques for bare breast  o Suggested positions Cross cradle  o Fine tune position by making sure your fingers beneath the breast as well as your bra, are out of the way of your baby's chin.  o Positioning:  Many positions shown, great sidelying at 7 minutes.   o See http://globalhealthmedia.org/portfolio-items/positions-for-breastfeeding/?gqdyicnpuAV=24337  •  Latch on Techniques for bare breast  o Fine tune latch:  - By holding your baby more securely at the breast, assisting your baby to stay attached by:  - Bringing your baby to your breast, not breast to the baby  - Your baby's cheek to touch breast securely, nose tipped back  - Hold your baby firmly in place so when your baby forgets to suck and picks it back up again your baby is in the correct spot. You will be extinguishing behavior and replacing it with a deeper latch to stimulate suck and provide satisfaction at the breast  - Your baby needs as much breast as deep in the mouth as possible to allow your nipples to heal and for you more importantly to maximize efficiency at the breast  • Latch is asymmetrical, leading with the chin, getting more underneath.   •  Triple feeding: A short term solution: Breast/bottle/pump:  o FIRST decide what you can do at that feeding and you may decide to double feed -pump and bottle, if you are going to offer the breast at that feeding:  - nurse first and limit time on the breasts to 20+/- min total  - finish with bottle  - pump afterwards to finish emptying your breasts which will help increase milk supply  - use your own pumped milk, formula or donor human milk  - 30gm or ml = 1oz  •  Pumping guidelines:  o Both breasts using \"Hands-on Pumping\" for 10-15 minutes to stimulate milk production. See video at : http://newborns.[employer].edu/Breastfeeding/MaxProduction.html.  o Pumping is effective but can quickly exhaust and overwhelm a new mother  o Pump 4 times in 24 hours  o Type of pump:  - Hospital " grade  - http://www.MyCordBank.com.com/healthcare-professionals/videos/ameda-platinum-with-hygienikit-video  - Always double pump  o How long will vary woman to woman, typically 8-15 minutes, or 1-2 minutes after flow slows  o Flange Fit: Freely moving nipple in the tunnel with some movement of the areola.  - Today's evaluation indicates appropriate flange  •  Increasing supply besides Galactogogues and Pumping: Warmth, Relaxation , Physical, auditory narratives, Childbirth relaxation Techniques, Shoulder Massages and Take a nap  •  Connect with other mothers:  o Facebook:   - [place] Breastfeeds: [email or web address]://[email or web address]/[place].breastfeeds/  - Well-Nourished Babies (Private group for questions and support): https://www.[employer].com/groups/883140502461496/  o [facility] for support not assessment: Tuesday, Thursday and Saturday at 11am  - DEYANIRA Christopher, IBCLARE  and JOHN Grove generally facilitate Tuesday [facility] [place]  •  Nipple care:  o May apply breastmilk  o Moist-oily ointment after feeding/pumping, ie Lanolin nipple butter, coconut or olive oil, if desired/needed 2-3 times/day until nipples are healed  o You do not need to wash this off before pumping or feeding the baby  o You may want to remove baby from breast when active swallowing stops to avoid prolonged nipple compression       Mom expressed understanding, and asked appropriate questions    Summary: Breast-feeding is been established day 9 but infant was ineffective at the breast with resulting jaundice and needing BiliBlanket.  And so breasts were not effectively stimulated resulting in a lowered milk production.  Working on reversing that and maximizing milk supply by deeper latch and hospital grade electric pump.  Baby is healthy.  Mom will provide an additional 6 ounces of milk per day    Follow-up for infant weight check and dyad breastfeeding evaluation in 1 week(s)  Please call [phone] if you have not scheduled your next  appointment    A total 75 minutes, this does not include infant assessment time, were spent face to face with more than 50% spent counseling and coordinating care as detailed in the above note.       PLEASE NOTE: Some of this note was created using voice recognition software. I have made every reasonable attempt to correct obvious errors, but I expect that there may be errors of grammar and possibly content that I did not discover prior finalizing this note.  SHIRLEY Franklin.

## 2020-03-11 ENCOUNTER — GYNECOLOGY VISIT (OUTPATIENT)
Dept: OBGYN | Facility: CLINIC | Age: 30
End: 2020-03-11
Payer: COMMERCIAL

## 2020-03-11 DIAGNOSIS — O92.5 LACTATION SUPPRESSION: ICD-10-CM

## 2020-03-11 PROCEDURE — 99215 OFFICE O/P EST HI 40 MIN: CPT | Performed by: NURSE PRACTITIONER

## 2020-03-11 NOTE — PROGRESS NOTES
Subjective:       Dusty Lambert is a 30 y.o. female here to follow up lactation care. She is here today with babyDwight.    Concerns: Weight check and feeding assessment. Would like to make sure the changes she has made to the previous plan are working.     HPI:   Pertinent  history:   Mother does not have a history of advanced maternal age, GDM, hypertension prior to pregnancy, insulin resistance, multiple gestation, PCOS and thyroid disease. These are common conditions which may interfere in establishing milk supply.  Breast changes in pregnancy: Yes no from mom, yes from dad  History of breast surgeries: No      FEEDING HISTORY:    Past breastfeeding history:  first baby to breastfeed  Hospital course: Initial difficulties, worked with lactation, sore nipples.    Prior to this Visit: Saw pediatrician day 5 and started on BiliBlanket, started pumping with Medela pump and style, fed back pumped milk and started formula.  Currently: Was following plan given at last appointment. Pumping after most feedings. In the past 3 days she has cut back to pumping 3x per day and supplementing .5-1oz after feedings when Dwight is showing signs that she is still hungry. Since the appointment 5 days ago, she has used formula 2x. Now has 4oz in the fridge.     Both breasts: Yes    Supplement: Expressed breast milk   Quanity: 15-30ml after some feedings  How given/devices:  Bottle    Nipple Shield Use: None    Breast Pumping:     Frequency: 3x  Type of pump: Medela PNS  Flange size/type: 24mm  NO pain with pumping, suction at max    ROS:  Constitutional:   no fever, chills. Feeling well  Gastrointestinal:  Negative for nausea, vomiting  Breasts: No soreness of breasts and no soreness of nipples  Psychiatric: Experiencing anxiety and Managing ok  Mental Health:  NOT Feeling irritable, agitated, angry, overwhelmed, apathy, exhaustion  NOT having sleep changes, appetite changes       Objective:     General: no  acute distress  Neurological:  Alert and oriented x3  Breasts: Symetrical , Soft, Plugged Duct - no evidence and Mastitis  - no S/S   Wider spaced breast, minimal changes. Sufficient tissue superiorly.  Nipples: intact  Psychiatric:  Normal mood and affect. Her behavior is normal. Judgment and thought content normal   Mental Health:  Feeling overwhelmed, agitation, hypervigilance NOT exhibited  Sadness and crying situational and appropriate     Assessment/Plan & Lactation Counseling:     Infant Weight History:   02/26/2020: 8# 11oz  03/06/2020: 8# 2.2oz 0.5oz gain per day from ped visit 2 days ago  03/11/2020: 8# 10oz   Infant intake at Breast:: L 18mls     R 20mls    Total 38mls  Milk Transfer at this feeding:   Effective breastfeeding due to feeding shortly before appointment  Initiation of Feeding: Infant initiates  Position of Feeding:    Right: football  Left: football  Attachment Achieved: rapidly  Nipple shield: N/A      Suck Pattern at the breast: Suck burst and normal rest  Behavior Following Observed Feeding: content    Latch: Mom latches independently     Suckling/Feeding: attaches, baby fed effectively, elicits CORRIE and rhythmic  Milk Supply Available: Normal, will continue to follow     Diagnosis/Problem  Lactation suppression      Discussed concerns and symptoms as listed above in assessment and guidance summarized below.  Topics reviewed included:  • Maternal Mental Health: Discussed new mothering in encountering terror with the task to keep her baby alive, the alarming realness and sometimes feeling mechanical, the pereira of the heart and soul with the struggle to survive and there is revovery, but it is a journey and process.  Encouraged  sleep and eat at a minimum. Grief is normal. Encouraged to reach out, resources provided  • Sleep or lack of and strategies to manage restorative sleep, although short amounts, significant to the mental health of the mother  • Milk supply is dependent on how many  "times the baby removes milk and how well the breasts are emptied in a 24 hour period. Ok to go longer stretches at night as long as the stimulation is made up during the day.   • Feeding: Feed your baby every 2-3 hours, more often if baby acts hungry. Need to get in 8-10 feedings per 24 hours.   • Given infants weight you can allow baby to go longer at night, up to 4 hours  • Pumping guidelines:  o Both breasts using \"Hands-on Pumping\" for 10-15 minutes to stimulate milk production. See video at : [email or web address]/Breastfeeding/MaxProduction.html.  o Pumping is effective but can quickly exhaust and overwhelm a new mother  o Pump 3 times in 24 hours  o Type of pump:  - Personal Pump, Medela PNS  - http://www.[place].[employer]/healthcare-professionals/videos/ameda-platinum-with-hygienikit-video  - Always double pump  • How long will vary woman to woman, typically 8-15 minutes, or 1-2 minutes after flow slows  • Connect with other mothers:  o Facebook:   - [place] [facility]: [email or web address]://[email or web address]/[place].breastfeeds/  - Well-Nourished Babies (Private group for questions and support): https://www.facebook.com/groups/404529259168995/  o Breastfeeding [place] for support not assessment: Tuesday, Thursday and Saturday at 11am  - DEYANIRA Christopher, IBCLC  and Maria C Mackey IBCLC generally facilitate Tuesday Breastfeeding [place]       Mom expressed understanding, and asked appropriate questions    Summary: Supply has increased substantially within the past 5 days. Dusty does an excellent job reading Dwight and knowing when to supplement. It was suggested by an outside source to to a \"nursing vacation\" and spend the day in bed with baby, skin to skin. We discussed options, if she wants to do it, there is no harm. If it is not enjoyable then there is no pressure to make it happen. The plan moving forward is to continue what she has been doing in the past 3 days. A follow up appointment in 7 days to check weight and " intake.     Follow-up for infant weight check and dyad breastfeeding evaluation in 1 week(s), Wednesday, 3/18/2020 with JOHN Grove.     Please call [phone] if you have not scheduled your next appointment    A total 52 minutes were spent face to face with more than 50% spent counseling and coordinating care as detailed in the above note.        Maria C Mackey

## 2020-03-17 PROBLEM — O92.29 SORE NIPPLES DUE TO LACTATION: Status: RESOLVED | Noted: 2020-03-06 | Resolved: 2020-03-17

## 2020-03-18 ENCOUNTER — OFFICE VISIT (OUTPATIENT)
Dept: OBGYN | Facility: CLINIC | Age: 30
End: 2020-03-18
Payer: COMMERCIAL

## 2020-03-18 VITALS — WEIGHT: 8.99 LBS | BODY MASS INDEX: 1.41 KG/M2

## 2020-03-18 DIAGNOSIS — O92.79 LACTATION DISORDER, POSTPARTUM CONDITION: ICD-10-CM

## 2020-03-18 PROBLEM — O92.5 LACTATION SUPPRESSION: Status: RESOLVED | Noted: 2020-03-06 | Resolved: 2020-03-18

## 2020-03-18 PROCEDURE — 99215 OFFICE O/P EST HI 40 MIN: CPT | Performed by: NURSE PRACTITIONER

## 2020-03-18 ASSESSMENT — FIBROSIS 4 INDEX: FIB4 SCORE: 0.71

## 2020-03-18 NOTE — PROGRESS NOTES
Subjective:       Dusty Lambert is a 30 y.o. female here to follow up lactation care. She is here today with baby.    Concerns:   Infant weight  Milk supply  Managing pumping  Frequent feedings in the evening    HPI:   Pertinent  history:   Mother does not have a history of advanced maternal age, GDM, hypertension prior to pregnancy, insulin resistance, multiple gestation, PCOS and thyroid disease. These are common conditions which may interfere in establishing milk supply.  Breast changes in pregnancy: Yes no from mom, yes from dad  History of breast surgeries: No      FEEDING HISTORY:    Past breastfeeding history:  first baby to breastfeed  Hospital course: Initial difficulties, worked with lactation, sore nipples.    Prior to visit on 3/6: Saw pediatrician day 5 and started on BiliBlanket, started pumping with Medela pump and style, fed back pumped milk and started formula.  Prior to visit on 3/11 Was following plan given at last appointment. Pumping after most feedings. In the past 3 days she has cut back to pumping 3x per day and supplementing .5-1oz after feedings when Dwight is showing signs that she is still hungry. Since the appointment 5 days ago, she has used formula 2x. Now has 4oz in the fridge.   Currently exclusively breast-feeding pumping 3 times per day has been giving 1 bottle per day has milk stored in the freezer  Both breasts: Yes      Supplement:Expressed breast milk  Quanity: Full bottle and  does a feeding and mom pumps   how given/devices:  Bottle    Nipple Shield Use: None    Breast Pumping:   Frequency:   3 times per day type of pump: Medela PNS  Flange size/type: 24mm  NO pain with pumping, suction at max    ROS:  Constitutional:   no fever, chills. Feeling well  Gastrointestinal:  Negative for nausea, vomiting  Breasts: No soreness of breasts and Soreness of nipples  Psychiatric: Experiencing anxiety and Managing ok  Mental Health:  NOT Feeling irritable,  agitated, angry, overwhelmed, apathy, exhaustion  NOT having sleep changes, appetite changes       Objective:     General: no acute distress  Neurological:  Alert and oriented x3  Breasts: Symetrical , Soft, Plugged Duct - no evidence and Mastitis  - no S/S   Wider spaced breast, minimal changes. Sufficient tissue superiorly.  Nipples: intact  Psychiatric:  Normal mood and affect. Her behavior is normal. Judgment and thought content normal   Mental Health:  Feeling overwhelmed, agitation, hypervigilance NOT exhibited  Sadness and crying situational and appropriate   Assessment/Plan & Lactation Counseling:     Infant Weight History:   02/26/2020: 8# 11oz  03/06/2020: 8# 2.2oz 0.5oz gain per day from ped visit 2 days ago  03/11/2020: 8# 10oz   3/18/20 8#15.9oz 6oz gain in 7 days    Infant intake at Breast:: L 0.6oz     R 0.3oz    Total 0.9oz, head fed prior to visit on one side  Milk Transfer at this feeding:   Effective breastfeeding   Initiation of Feeding: Infant initiates  Position of Feeding:    Right: cross cradle  Left: cross cradle  Attachment Achieved: rapidly  Nipple shield: N/A      Suck Pattern at the breast: Suck burst and normal rest  Behavior Following Observed Feeding: fussy  Nipple Pain no pain  Latch: Mom latches independently   Suckling/Feeding: attaches, baby fed effectively, elicits CORRIE and rhythmic  Milk Supply Available:WNL    Diagnosis/Problem  Lactation disorder, postpartum condition.  Resolving the management of breast-feeding      Discussed concerns and symptoms as listed above in assessment and guidance summarized below.  • Topics reviewed included:  •  Milk supply is very well-established with her pumping.  Mom is pumping at 1 AM.  Waking up for this.  Suggested she let baby sleep for 5 hours in a row and nurse at that time if inclined she can pump afterwards or save that pumping for earlier in the day  • .  Feeding: Parents are managing this with excellence indicated by infant growth.   Continue plan    Given infants weight you MAY allow baby to go longer at night    Mom expressed understanding, and asked appropriate questions    Summary: Over 3 weeks mom has established an excellent supply with her efforts and pumping and feeding, she has resolved her sore nipples with position and latch and time, and she is getting into her own groove with managing breast-feeding.  We will continue to follow every 2 to 3 weeks to assure proper growth and milk production remains maximized    Follow-up for infant weight check and dyad breastfeeding evaluation in 2-3 week(s)  Please call [phone] if you have not scheduled your next appointment    A total 40  minutes, this does not include infant assessment time, were spent face to face with more than 50% spent counseling and coordinating care as detailed in the above note.       PLEASE NOTE: Some of this note was created using voice recognition software. I have made every reasonable attempt to correct obvious errors, but I expect that there may be errors of grammar and possibly content that I did not discover prior finalizing this note.    SHIRLEY Franklin.

## 2021-08-05 ENCOUNTER — HOSPITAL ENCOUNTER (OUTPATIENT)
Facility: MEDICAL CENTER | Age: 31
End: 2021-08-05
Attending: NURSE PRACTITIONER
Payer: COMMERCIAL

## 2021-08-05 ENCOUNTER — OFFICE VISIT (OUTPATIENT)
Dept: URGENT CARE | Facility: PHYSICIAN GROUP | Age: 31
End: 2021-08-05
Payer: COMMERCIAL

## 2021-08-05 VITALS
TEMPERATURE: 98.6 F | RESPIRATION RATE: 16 BRPM | OXYGEN SATURATION: 97 % | SYSTOLIC BLOOD PRESSURE: 106 MMHG | HEIGHT: 67 IN | DIASTOLIC BLOOD PRESSURE: 82 MMHG | HEART RATE: 87 BPM | WEIGHT: 210 LBS | BODY MASS INDEX: 32.96 KG/M2

## 2021-08-05 DIAGNOSIS — R05.9 COUGH: ICD-10-CM

## 2021-08-05 DIAGNOSIS — R51.9 ACUTE NONINTRACTABLE HEADACHE, UNSPECIFIED HEADACHE TYPE: ICD-10-CM

## 2021-08-05 LAB — COVID ORDER STATUS COVID19: NORMAL

## 2021-08-05 PROCEDURE — U0005 INFEC AGEN DETEC AMPLI PROBE: HCPCS

## 2021-08-05 PROCEDURE — 99203 OFFICE O/P NEW LOW 30 MIN: CPT | Performed by: NURSE PRACTITIONER

## 2021-08-05 PROCEDURE — U0003 INFECTIOUS AGENT DETECTION BY NUCLEIC ACID (DNA OR RNA); SEVERE ACUTE RESPIRATORY SYNDROME CORONAVIRUS 2 (SARS-COV-2) (CORONAVIRUS DISEASE [COVID-19]), AMPLIFIED PROBE TECHNIQUE, MAKING USE OF HIGH THROUGHPUT TECHNOLOGIES AS DESCRIBED BY CMS-2020-01-R: HCPCS

## 2021-08-05 RX ORDER — LEVONORGESTREL AND ETHINYL ESTRADIOL 0.15-0.03
1 KIT ORAL DAILY
COMMUNITY
Start: 2021-07-15 | End: 2023-03-27

## 2021-08-05 ASSESSMENT — ENCOUNTER SYMPTOMS
HEADACHES: 1
NAUSEA: 0
SINUS PAIN: 0
CHILLS: 0
SHORTNESS OF BREATH: 0
VOMITING: 0
DIARRHEA: 0
WHEEZING: 0
ABDOMINAL PAIN: 0
FEVER: 0
COUGH: 1
WEAKNESS: 0
MYALGIAS: 0
EYE DISCHARGE: 0
EYE REDNESS: 0
DIZZINESS: 0
SORE THROAT: 1
CONSTIPATION: 0

## 2021-08-05 ASSESSMENT — FIBROSIS 4 INDEX: FIB4 SCORE: 0.73

## 2021-08-05 NOTE — PROGRESS NOTES
Subjective:      Dusty Lambert is a 31 y.o. female who presents with Cough (Pt reports sore throat, headache, dry cough. Onset 1 day. )            HPI  Experiencing dry cough without shortness of breath or wheeze, history of  childhood asthma. Headache, sore throat yesterday, but improved. States felt uncomfortable last night and could not sleep. Taking Nyquil, NSAID. No known exposure to others with illness or COVID.     PMH:  has a past medical history of Cervical dysplasia, Migraine, and Thyroid nodule. She also has no past medical history of Clotting disorder (HCC), Heart attack (HCC), or Liver disease.  MEDS:   Current Outpatient Medications:   •  KURVELO 0.15-30 MG-MCG per tablet, , Disp: , Rfl:   •  docusate sodium 100 MG Cap, Take 100 mg by mouth 2 times a day as needed for Constipation. (Patient not taking: Reported on 8/5/2021), Disp: 60 Cap, Rfl: 1  •  ibuprofen (MOTRIN) 600 MG Tab, Take 1 Tab by mouth every 6 hours as needed. (Patient not taking: Reported on 8/5/2021), Disp: 30 Tab, Rfl: 1  •  Prenatal Vit-Fe Fumarate-FA (PRENATAL PO), Take  by mouth. (Patient not taking: Reported on 8/5/2021), Disp: , Rfl:   ALLERGIES:   Allergies   Allergen Reactions   • Vancomycin Rash     Pt currently red and blotchy on anterior and posterior thoracic area from previous dose of Vanco. Tolerated vancomycin 6/29/18 without issue (suspect infusion related reaction).   • Other Food Hives     Cranberries and radishes   • Other Food Hives     Radishes (Raphanus Sativus)   • Sulfa Drugs Hives     SURGHX:   Past Surgical History:   Procedure Laterality Date   • ANKLE ORIF  3/2/2012    Performed by PIEDAD NEFF at [facility]   • TURBINOPLASTY  12/19/2008    Performed by EMILIANO LUNA at [facility]   • NASAL SEPTAL RECONSTRUCTION  12/19/2008    Performed by EMILIANO LUNA at [facility]   • NASAL SEPTAL RECONST  2006   • NASAL SEPTAL RECONSTRUCTION  2005   •  "TONSILLECTOMY AND ADENOIDECTOMY  1997   • OTHER ORTHOPEDIC SURGERY       SOCHX:  reports that she quit smoking about 10 years ago. She has never used smokeless tobacco. She reports current alcohol use. She reports that she does not use drugs.  FH: Family history was reviewed, no pertinent findings to report    Review of Systems   Constitutional: Negative for chills, fever and malaise/fatigue.   HENT: Positive for congestion and sore throat. Negative for ear pain and sinus pain.    Eyes: Negative for discharge and redness.   Respiratory: Positive for cough. Negative for shortness of breath and wheezing.    Gastrointestinal: Negative for abdominal pain, constipation, diarrhea, nausea and vomiting.   Musculoskeletal: Negative for myalgias.   Skin: Negative for itching and rash.   Neurological: Positive for headaches. Negative for dizziness and weakness.   Endo/Heme/Allergies: Negative for environmental allergies.   All other systems reviewed and are negative.         Objective:     /82 (BP Location: Left arm, Patient Position: Sitting, BP Cuff Size: Large adult)   Pulse 87   Temp 37 °C (98.6 °F) (Temporal)   Resp 16   Ht 1.702 m (5' 7\")   Wt 95.3 kg (210 lb)   SpO2 97%   BMI 32.89 kg/m²      Physical Exam  Vitals reviewed.   Constitutional:       General: She is awake. She is not in acute distress.     Appearance: Normal appearance. She is well-developed. She is not ill-appearing, toxic-appearing or diaphoretic.   HENT:      Head: Normocephalic.      Right Ear: Tympanic membrane, ear canal and external ear normal.      Left Ear: Tympanic membrane, ear canal and external ear normal.      Nose: Nose normal.      Mouth/Throat:      Lips: Pink.      Mouth: Mucous membranes are dry.      Pharynx: Oropharynx is clear. Uvula midline.   Eyes:      Conjunctiva/sclera: Conjunctivae normal.      Pupils: Pupils are equal, round, and reactive to light.   Cardiovascular:      Rate and Rhythm: Normal rate.   Pulmonary: "      Effort: Pulmonary effort is normal.   Musculoskeletal:         General: Normal range of motion.      Cervical back: Normal range of motion and neck supple.   Skin:     General: Skin is warm and dry.   Neurological:      Mental Status: She is alert and oriented to person, place, and time.   Psychiatric:         Attention and Perception: Attention and perception normal.         Mood and Affect: Mood and affect normal.         Speech: Speech normal.         Behavior: Behavior normal. Behavior is cooperative.         Thought Content: Thought content normal.         Cognition and Memory: Cognition and memory normal.         Judgment: Judgment normal.                        Assessment/Plan:        1. Cough    - SARS-CoV-2 PCR (24 hour In-House): Collect NP swab in VTM; Future    2. Acute nonintractable headache, unspecified headache type    - SARS-CoV-2 PCR (24 hour In-House): Collect NP swab in VTM; Future    -Stay home isolated from others until COVID test resulted the follow CDC guidelines for positive cases as discussed  -Increase water intake  -May use Tylenol/Ibuprofen as needed for fever or body aches  -Get rest  -Salt water gargle as needed   -Flonase, saline nasal spray as needed for nasal congestion  -May use over the counter cough suppressant medications like plain Robitussin/Delsym as needed   -Monitor for fevers, worse cough, shortness of breath, chest pain, chest tightness, sinus problems- need re-evaluation  -MyChart release for result, may take up to 24-36 hrs for result, patient notified

## 2021-08-06 LAB
SARS-COV-2 RNA RESP QL NAA+PROBE: DETECTED
SPECIMEN SOURCE: ABNORMAL

## 2021-10-13 ENCOUNTER — OFFICE VISIT (OUTPATIENT)
Dept: URGENT CARE | Facility: PHYSICIAN GROUP | Age: 31
End: 2021-10-13
Payer: COMMERCIAL

## 2021-10-13 ENCOUNTER — HOSPITAL ENCOUNTER (OUTPATIENT)
Dept: LAB | Facility: MEDICAL CENTER | Age: 31
End: 2021-10-13
Attending: PHYSICIAN ASSISTANT
Payer: COMMERCIAL

## 2021-10-13 VITALS
TEMPERATURE: 99.1 F | RESPIRATION RATE: 16 BRPM | DIASTOLIC BLOOD PRESSURE: 60 MMHG | HEIGHT: 67 IN | SYSTOLIC BLOOD PRESSURE: 108 MMHG | OXYGEN SATURATION: 98 % | BODY MASS INDEX: 32.96 KG/M2 | WEIGHT: 210 LBS | HEART RATE: 77 BPM

## 2021-10-13 DIAGNOSIS — Z86.16 HISTORY OF COVID-19: ICD-10-CM

## 2021-10-13 DIAGNOSIS — R07.9 CHEST PAIN, UNSPECIFIED TYPE: ICD-10-CM

## 2021-10-13 DIAGNOSIS — R00.2 INTERMITTENT PALPITATIONS: ICD-10-CM

## 2021-10-13 LAB — D DIMER PPP IA.FEU-MCNC: <0.27 UG/ML (FEU) (ref 0–0.5)

## 2021-10-13 PROCEDURE — 36415 COLL VENOUS BLD VENIPUNCTURE: CPT

## 2021-10-13 PROCEDURE — 99213 OFFICE O/P EST LOW 20 MIN: CPT | Performed by: PHYSICIAN ASSISTANT

## 2021-10-13 PROCEDURE — 85379 FIBRIN DEGRADATION QUANT: CPT

## 2021-10-13 ASSESSMENT — ENCOUNTER SYMPTOMS
CHILLS: 0
WHEEZING: 0
PALPITATIONS: 1
VOMITING: 0
DIZZINESS: 0
NAUSEA: 0
FEVER: 0
HEADACHES: 1
ABDOMINAL PAIN: 0
COUGH: 0
SHORTNESS OF BREATH: 0
MYALGIAS: 1

## 2021-10-13 NOTE — PROGRESS NOTES
Subjective:   Dusty Lambert is a 31 y.o. female who presents for Chest Pain (L sided chest pain since mid-august after having covid )      HPI  31 y.o. female presents to urgent care with new problem to provider of intermittent left-sided chest pain since diagnosis of Covid on 8/5/2021.  Patient reports she feels intermittent palpitations.  Denies symptoms currently.  She denies increased chest pain with deep breath or shortness of breath.  She denies persistent cough.  No recent fevers.  Patient denies history of DVT/PE.  She is on birth control pills, however has not taken them over the last week.  She is not a smoker. Denies other associated aggravating or alleviating factors.     Review of Systems   Constitutional: Positive for malaise/fatigue. Negative for chills and fever.   Respiratory: Negative for cough, shortness of breath and wheezing.    Cardiovascular: Positive for chest pain and palpitations. Negative for leg swelling.   Gastrointestinal: Negative for abdominal pain, nausea and vomiting.   Musculoskeletal: Positive for myalgias.   Neurological: Positive for headaches. Negative for dizziness.       Patient Active Problem List   Diagnosis   • Thyroid nodule   • Family planning   • Restless leg syndrome   • Meningitis   • Miscarriage   • Class 1 obesity due to excess calories without serious comorbidity with body mass index (BMI) of 32.0 to 32.9 in adult   • At risk for postpartum depression   • Lactation disorder, postpartum condition     Past Surgical History:   Procedure Laterality Date   • ANKLE ORIF  3/2/2012    Performed by PIEDAD NEFF at [facility]   • TURBINOPLASTY  12/19/2008    Performed by EMILIANO LUNA at [facility]   • NASAL SEPTAL RECONSTRUCTION  12/19/2008    Performed by EMILIANO LUNA at [facility]   • NASAL SEPTAL RECONST  2006   • NASAL SEPTAL RECONSTRUCTION  2005   • TONSILLECTOMY AND ADENOIDECTOMY  1997   • OTHER  "ORTHOPEDIC SURGERY       Social History     Tobacco Use   • Smoking status: Former Smoker     Quit date: 8/5/2011     Years since quitting: 10.1   • Smokeless tobacco: Never Used   • Tobacco comment: was smoking Hooka/hashish 3-4 times a week, quit 8/28/13   Vaping Use   • Vaping Use: Never used   Substance Use Topics   • Alcohol use: Yes     Comment: 1-2 per month   • Drug use: No      Family History   Problem Relation Age of Onset   • Stroke Father    • Cancer Mother 52        breast      (Allergies, Medications, & Tobacco/Substance Use were reconciled by the Medical Assistant and reviewed by myself. The family history is prepopulated)     Objective:     /60   Pulse 77   Temp 37.3 °C (99.1 °F) (Temporal)   Resp 16   Ht 1.702 m (5' 7\")   Wt 95.3 kg (210 lb)   SpO2 98%   BMI 32.89 kg/m²     Physical Exam  Vitals reviewed.   Constitutional:       General: She is not in acute distress.     Appearance: Normal appearance. She is well-developed. She is not ill-appearing.   HENT:      Head: Normocephalic and atraumatic.      Nose: Nose normal.      Mouth/Throat:      Mouth: Mucous membranes are moist.      Pharynx: Oropharynx is clear.   Eyes:      Conjunctiva/sclera: Conjunctivae normal.   Cardiovascular:      Rate and Rhythm: Normal rate and regular rhythm.      Heart sounds: Normal heart sounds.   Pulmonary:      Effort: Pulmonary effort is normal. No respiratory distress.      Breath sounds: Normal breath sounds.   Musculoskeletal:      Cervical back: Normal range of motion and neck supple.   Skin:     General: Skin is warm and dry.   Neurological:      General: No focal deficit present.      Mental Status: She is alert and oriented to person, place, and time.   Psychiatric:         Mood and Affect: Mood normal.         Behavior: Behavior normal.         Thought Content: Thought content normal.         Judgment: Judgment normal.         Assessment/Plan:     1. Chest pain, unspecified type  D-DIMER    " REFERRAL TO CARDIOLOGY   2. History of COVID-19  D-DIMER    REFERRAL TO CARDIOLOGY   3. Intermittent palpitations  REFERRAL TO CARDIOLOGY     D dimer is negative, low suspicion for DVT/PE. Patient did not have active chest pain on urgent care evaluation today.  Her vital signs are stable.  Lung sounds are clear to auscultation bilaterally.  Discussed ED precautions and red flags.  I recommend that patient proceed immediately for reevaluation or to emergency department for onset of chest pain or shortness of breath.     Patient reports intermittent feelings of palpitations and chest pain, she states her primary care provider advised her to come to the urgent care for EKG and chest x-ray.  There is no indication for these tests at this time.  Discussed with patient she may follow-up with cardiology for further evaluation. referral placed.   Differential diagnosis, natural history, supportive care, and indications for immediate follow-up discussed.    Advised the patient to follow-up with the primary care physician for recheck, reevaluation, and consideration of further management.  Patient verbalized understanding of treatment plan and has no further questions regarding care.     I personally reviewed prior external notes and test results pertinent to today's visit.     Please note that this dictation was created using voice recognition software. I have made a reasonable attempt to correct obvious errors, but I expect that there are errors of grammar and possibly content that I did not discover before finalizing the note.    This note was electronically signed by Isabell Bolivar PA-C

## 2021-11-10 ENCOUNTER — OFFICE VISIT (OUTPATIENT)
Dept: CARDIOLOGY | Facility: MEDICAL CENTER | Age: 31
End: 2021-11-10
Payer: COMMERCIAL

## 2021-11-10 VITALS
BODY MASS INDEX: 32.96 KG/M2 | HEART RATE: 70 BPM | HEIGHT: 67 IN | WEIGHT: 210 LBS | OXYGEN SATURATION: 98 % | DIASTOLIC BLOOD PRESSURE: 78 MMHG | RESPIRATION RATE: 18 BRPM | SYSTOLIC BLOOD PRESSURE: 118 MMHG

## 2021-11-10 DIAGNOSIS — R07.89 OTHER CHEST PAIN: Primary | ICD-10-CM

## 2021-11-10 LAB — EKG IMPRESSION: NORMAL

## 2021-11-10 PROCEDURE — 99204 OFFICE O/P NEW MOD 45 MIN: CPT | Performed by: INTERNAL MEDICINE

## 2021-11-10 PROCEDURE — 93000 ELECTROCARDIOGRAM COMPLETE: CPT | Performed by: INTERNAL MEDICINE

## 2021-11-10 RX ORDER — ACETAMINOPHEN 325 MG/1
650 TABLET ORAL 2 TIMES DAILY PRN
COMMUNITY
End: 2023-03-27

## 2021-11-10 RX ORDER — IBUPROFEN 200 MG
200 TABLET ORAL PRN
Status: ON HOLD | COMMUNITY
End: 2022-04-13

## 2021-11-10 ASSESSMENT — ENCOUNTER SYMPTOMS
WEIGHT GAIN: 0
NAUSEA: 0
VOMITING: 0
CLAUDICATION: 0
PND: 0
ORTHOPNEA: 0
SYNCOPE: 0
CONSTIPATION: 0
FEVER: 0
DECREASED APPETITE: 0
WEIGHT LOSS: 0
DYSPNEA ON EXERTION: 0
DIARRHEA: 0
ABDOMINAL PAIN: 0
DEPRESSION: 0
COUGH: 0
SHORTNESS OF BREATH: 0
DIZZINESS: 0
IRREGULAR HEARTBEAT: 0
BACK PAIN: 0
NEAR-SYNCOPE: 0
HEARTBURN: 0
ALTERED MENTAL STATUS: 0
FLANK PAIN: 0
PALPITATIONS: 0
BLURRED VISION: 0

## 2021-11-10 NOTE — PROGRESS NOTES
"Cardiology Note    Chief Complaint   Patient presents with   • Chest Pain     F/V Dx: Left side chest pain   • Chest Pressure     F/V Dx: deep, sharp chest pressure       History of Present Illness: Dusty Lambert is a 31 y.o. female who presents for chest pain evaluation.    covid-19 8/2021. Mostly recovered. Did not need hospitalization. However, thereafter heart rate started to increase. Also developed chest pain. Left side. Deep and sharp. \"stabbing.\" Cannot pinpoint aggravating factors. No relieving factors. Non exertional. Typically less than 5 min. Hasn't affected her function. Works out. Does cycling and hasn't had to stop. No toxic social habits. No relevant family history. Pregnancy not complicated.     Review of Systems   Constitutional: Negative for decreased appetite, fever, malaise/fatigue, weight gain and weight loss.   HENT: Negative for congestion and nosebleeds.    Eyes: Negative for blurred vision.   Cardiovascular: Negative for chest pain, claudication, dyspnea on exertion, irregular heartbeat, leg swelling, near-syncope, orthopnea, palpitations, paroxysmal nocturnal dyspnea and syncope.   Respiratory: Negative for cough and shortness of breath.    Endocrine: Negative for cold intolerance and heat intolerance.   Skin: Negative for rash.   Musculoskeletal: Negative for back pain.   Gastrointestinal: Negative for abdominal pain, constipation, diarrhea, heartburn, melena, nausea and vomiting.   Genitourinary: Negative for dysuria, flank pain and hematuria.   Neurological: Negative for dizziness.   Psychiatric/Behavioral: Negative for altered mental status and depression.         Past Medical History:   Diagnosis Date   • Cervical dysplasia    • Migraine    • Thyroid nodule     small left lobe nodule         Past Surgical History:   Procedure Laterality Date   • ANKLE ORIF  3/2/2012    Performed by PIEDAD NEFF at [facility] ORS   • TURBINOPLASTY  12/19/2008    Performed by " EMILIANO LUNA at [facility] ORS   • NASAL SEPTAL RECONSTRUCTION  12/19/2008    Performed by EMILIANO LUNA at [facility] ORS   • NASAL SEPTAL RECONST  2006   • NASAL SEPTAL RECONSTRUCTION  2005   • TONSILLECTOMY AND ADENOIDECTOMY  1997   • OTHER ORTHOPEDIC SURGERY           Current Outpatient Medications   Medication Sig Dispense Refill   • acetaminophen (TYLENOL) 325 MG Tab Take 650 mg by mouth as needed.     • ibuprofen (MOTRIN) 200 MG Tab Take 200 mg by mouth as needed.     • KURVELO 0.15-30 MG-MCG per tablet        No current facility-administered medications for this visit.         Allergies   Allergen Reactions   • Vancomycin Rash     Pt currently red and blotchy on anterior and posterior thoracic area from previous dose of Vanco. Tolerated vancomycin 6/29/18 without issue (suspect infusion related reaction).   • Other Food Hives     Cranberries and radishes   • Other Food Hives     Radishes (Raphanus Sativus)   • Sulfa Drugs Hives         Family History   Problem Relation Age of Onset   • Stroke Father    • Cancer Mother 52        breast         Social History     Socioeconomic History   • Marital status:      Spouse name: Not on file   • Number of children: 0   • Years of education: in college   • Highest education level: Not on file   Occupational History   • Occupation: College Student[employer]      Employer: student   Tobacco Use   • Smoking status: Former Smoker     Quit date: 8/5/2011     Years since quitting: 10.2   • Smokeless tobacco: Never Used   • Tobacco comment: was smoking Hooka/hashish 3-4 times a week, quit 8/28/13   Vaping Use   • Vaping Use: Never used   Substance and Sexual Activity   • Alcohol use: Yes     Alcohol/week: 1.2 oz     Types: 2 Standard drinks or equivalent per week     Comment: 1-2x a week   • Drug use: No   • Sexual activity: Yes     Partners: Male     Birth control/protection: Condom, Pill   Other Topics Concern   •  "Not on file   Social History Narrative    ** Merged History Encounter **          Social Determinants of Health     Financial Resource Strain:    • Difficulty of Paying Living Expenses: Not on file   Food Insecurity:    • Worried About Running Out of Food in the Last Year: Not on file   • Ran Out of Food in the Last Year: Not on file   Transportation Needs:    • Lack of Transportation (Medical): Not on file   • Lack of Transportation (Non-Medical): Not on file   Physical Activity:    • Days of Exercise per Week: Not on file   • Minutes of Exercise per Session: Not on file   Stress:    • Feeling of Stress : Not on file   Social Connections:    • Frequency of Communication with Friends and Family: Not on file   • Frequency of Social Gatherings with Friends and Family: Not on file   • Attends Bahai Services: Not on file   • Active Member of Clubs or Organizations: Not on file   • Attends Club or Organization Meetings: Not on file   • Marital Status: Not on file   Intimate Partner Violence:    • Fear of Current or Ex-Partner: Not on file   • Emotionally Abused: Not on file   • Physically Abused: Not on file   • Sexually Abused: Not on file   Housing Stability:    • Unable to Pay for Housing in the Last Year: Not on file   • Number of Places Lived in the Last Year: Not on file   • Unstable Housing in the Last Year: Not on file         Physical Exam:  Ambulatory Vitals  /78 (BP Location: Left arm, Patient Position: Sitting, BP Cuff Size: Adult)   Pulse 70   Resp 18   Ht 1.702 m (5' 7\")   Wt 95.3 kg (210 lb)   SpO2 98%    BP Readings from Last 4 Encounters:   11/10/21 118/78   10/13/21 108/60   08/05/21 106/82   02/28/20 125/76     Weight/BMI:   Vitals:    11/10/21 1348   BP: 118/78   Weight: 95.3 kg (210 lb)   Height: 1.702 m (5' 7\")    Body mass index is 32.89 kg/m².  Wt Readings from Last 4 Encounters:   11/10/21 95.3 kg (210 lb)   10/13/21 95.3 kg (210 lb)   08/05/21 95.3 kg (210 lb)   03/18/20 (!) 4.08 " kg (8 lb 15.9 oz)       Physical Exam  Constitutional:       General: She is not in acute distress.  HENT:      Head: Normocephalic and atraumatic.   Eyes:      Conjunctiva/sclera: Conjunctivae normal.      Pupils: Pupils are equal, round, and reactive to light.   Neck:      Vascular: No JVD.   Cardiovascular:      Rate and Rhythm: Normal rate and regular rhythm.      Heart sounds: Normal heart sounds. No murmur heard.  No friction rub. No gallop.    Pulmonary:      Effort: Pulmonary effort is normal. No respiratory distress.      Breath sounds: Normal breath sounds. No wheezing or rales.   Chest:      Chest wall: No tenderness.   Abdominal:      General: Bowel sounds are normal. There is no distension.      Palpations: Abdomen is soft.   Musculoskeletal:      Cervical back: Normal range of motion and neck supple.   Skin:     General: Skin is warm and dry.   Neurological:      Mental Status: She is alert and oriented to person, place, and time.   Psychiatric:         Mood and Affect: Affect normal.         Judgment: Judgment normal.         Lab Data Review:  No results found for: CHOLSTRLTOT, LDL, HDL, TRIGLYCERIDE    Lab Results   Component Value Date/Time    SODIUM 137 09/16/2019 08:32 AM    POTASSIUM 3.3 (L) 09/16/2019 08:32 AM    CHLORIDE 107 09/16/2019 08:32 AM    CO2 23 09/16/2019 08:32 AM    GLUCOSE 110 (H) 09/16/2019 08:32 AM    BUN 11 09/16/2019 08:32 AM    CREATININE 0.59 09/16/2019 08:32 AM     CrCl cannot be calculated (Patient's most recent lab result is older than the maximum 7 days allowed.).  Lab Results   Component Value Date/Time    ALKPHOSPHAT 38 09/16/2019 08:32 AM    ASTSGOT 13 09/16/2019 08:32 AM    ALTSGPT 9 09/16/2019 08:32 AM    TBILIRUBIN 0.5 09/16/2019 08:32 AM      Lab Results   Component Value Date/Time    WBC 15.9 (H) 02/27/2020 08:27 AM    WBC 11.3 (H) 02/01/2010 03:11 PM     Lab Results   Component Value Date/Time    HBA1C 5.2 06/28/2018 09:53 AM     No components found for:  TROP      Cardiac Imaging and Procedures Review:      EKG 11/10/21 interpreted by me sinus, LVH    Medical Decision Making:  Problem List Items Addressed This Visit     Other chest pain - Primary    Relevant Orders    EKG (Completed)    EC-ECHOCARDIOGRAM COMPLETE W/O CONT    EC-ECHOCARDIOGRAM REST/STRESS W/O CONT        Chest pain following covid-19 without typical cardiac features with LVH on EKG - check TTE and stress echo. If normal, no further studies.     It was my pleasure to meet with Ms. Lambert.

## 2021-11-10 NOTE — PATIENT INSTRUCTIONS
Nonspecific Chest Pain, Adult  Chest pain can be caused by many different conditions. It can be caused by a condition that is life-threatening and requires treatment right away. It can also be caused by something that is not life-threatening. If you have chest pain, it can be hard to know the difference, so it is important to get help right away to make sure that you do not have a serious condition.  Some life-threatening causes of chest pain include:  · Heart attack.  · A tear in the body's main blood vessel (aortic dissection).  · Inflammation around your heart (pericarditis).  · A problem in the lungs, such as a blood clot (pulmonary embolism) or a collapsed lung (pneumothorax).  Some non life-threatening causes of chest pain include:  · Heartburn.  · Anxiety or stress.  · Damage to the bones, muscles, and cartilage that make up your chest wall.  · Pneumonia or bronchitis.  · Shingles infection (varicella-zoster virus).  Chest pain can feel like:  · Pain or discomfort on the surface of your chest or deep in your chest.  · Crushing, pressure, aching, or squeezing pain.  · Burning or tingling.  · Dull or sharp pain that is worse when you move, cough, or take a deep breath.  · Pain or discomfort that is also felt in your back, neck, jaw, shoulder, or arm, or pain that spreads to any of these areas.  Your chest pain may come and go. It may also be constant. Your health care provider will do lab tests and other studies to find the cause of your pain. Treatment will depend on the cause of your chest pain.  Follow these instructions at home:  Medicines  · Take over-the-counter and prescription medicines only as told by your health care provider.  · If you were prescribed an antibiotic, take it as told by your health care provider. Do not stop taking the antibiotic even if you start to feel better.  Lifestyle    · Rest as directed by your health care provider.  · Do not use any products that contain nicotine or tobacco,  such as cigarettes and e-cigarettes. If you need help quitting, ask your health care provider.  · Do not drink alcohol.  · Make healthy lifestyle choices as recommended. These may include:  ? Getting regular exercise. Ask your health care provider to suggest some activities that are safe for you.  ? Eating a heart-healthy diet. This includes plenty of fresh fruits and vegetables, whole grains, low-fat (lean) protein, and low-fat dairy products. A dietitian can help you find healthy eating options.  ? Maintaining a healthy weight.  ? Managing any other health conditions you have, such as high blood pressure (hypertension) or diabetes.  ? Reducing stress, such as with yoga or relaxation techniques.  General instructions  · Pay attention to any changes in your symptoms. Tell your health care provider about them or any new symptoms.  · Avoid any activities that cause chest pain.  · Keep all follow-up visits as told by your health care provider. This is important. This includes visits for any further testing if your chest pain does not go away.  Contact a health care provider if:  · Your chest pain does not go away.  · You feel depressed.  · You have a fever.  Get help right away if:  · Your chest pain gets worse.  · You have a cough that gets worse, or you cough up blood.  · You have severe pain in your abdomen.  · You faint.  · You have sudden, unexplained chest discomfort.  · You have sudden, unexplained discomfort in your arms, back, neck, or jaw.  · You have shortness of breath at any time.  · You suddenly start to sweat, or your skin gets clammy.  · You feel nausea or you vomit.  · You suddenly feel lightheaded or dizzy.  · You have severe weakness, or unexplained weakness or fatigue.  · Your heart begins to beat quickly, or it feels like it is skipping beats.  These symptoms may represent a serious problem that is an emergency. Do not wait to see if the symptoms will go away. Get medical help right away. Call your  local emergency services (911 in the [place] Do not drive yourself to the hospital.  Summary  · Chest pain can be caused by a condition that is serious and requires urgent treatment. It may also be caused by something that is not life-threatening.  · If you have chest pain, it is very important to see your health care provider. Your health care provider may do lab tests and other studies to find the cause of your pain.  · Follow your health care provider's instructions on taking medicines, making lifestyle changes, and getting emergency treatment if symptoms become worse.  · Keep all follow-up visits as told by your health care provider. This includes visits for any further testing if your chest pain does not go away.  This information is not intended to replace advice given to you by your health care provider. Make sure you discuss any questions you have with your health care provider.  Document Released: 09/27/2006 Document Revised: 06/20/2019 Document Reviewed: 06/20/2019  [employer] Patient Education © 2020 [employer]

## 2021-11-18 ENCOUNTER — PATIENT MESSAGE (OUTPATIENT)
Dept: CARDIOLOGY | Facility: MEDICAL CENTER | Age: 31
End: 2021-11-18

## 2021-11-18 NOTE — PATIENT COMMUNICATION
Gabriele Briones M.D.  You 20 minutes ago (10:00 AM)       Maryann Read,   Lets see if we can move up her testing and reorder as stat. Should get her vaccinated asap.   Thank you!      D/w VR. Echo and stress echo orders can be changed to urgent so pt can try to get in sooner, but he is not comfortable filling out form for a temporary exemption for vaccine.

## 2021-12-21 ENCOUNTER — HOSPITAL ENCOUNTER (OUTPATIENT)
Dept: CARDIOLOGY | Facility: MEDICAL CENTER | Age: 31
End: 2021-12-21
Attending: INTERNAL MEDICINE
Payer: COMMERCIAL

## 2021-12-21 DIAGNOSIS — R07.89 OTHER CHEST PAIN: ICD-10-CM

## 2021-12-21 PROCEDURE — 93306 TTE W/DOPPLER COMPLETE: CPT

## 2021-12-21 PROCEDURE — 93017 CV STRESS TEST TRACING ONLY: CPT

## 2021-12-22 LAB
LV EJECT FRACT  99904: 65
LV EJECT FRACT  99904: 65
LV EJECT FRACT MOD 2C 99903: 66.36
LV EJECT FRACT MOD 4C 99902: 63.57
LV EJECT FRACT MOD BP 99901: 64.44

## 2021-12-22 PROCEDURE — 93306 TTE W/DOPPLER COMPLETE: CPT | Mod: 26 | Performed by: INTERNAL MEDICINE

## 2021-12-22 PROCEDURE — 93350 STRESS TTE ONLY: CPT | Mod: 26 | Performed by: INTERNAL MEDICINE

## 2021-12-22 PROCEDURE — 93018 CV STRESS TEST I&R ONLY: CPT | Performed by: INTERNAL MEDICINE

## 2022-01-05 ENCOUNTER — OFFICE VISIT (OUTPATIENT)
Dept: CARDIOLOGY | Facility: MEDICAL CENTER | Age: 32
End: 2022-01-05
Payer: COMMERCIAL

## 2022-01-05 VITALS
DIASTOLIC BLOOD PRESSURE: 74 MMHG | OXYGEN SATURATION: 95 % | HEIGHT: 67 IN | WEIGHT: 215.6 LBS | RESPIRATION RATE: 14 BRPM | SYSTOLIC BLOOD PRESSURE: 110 MMHG | BODY MASS INDEX: 33.84 KG/M2 | HEART RATE: 71 BPM

## 2022-01-05 DIAGNOSIS — R07.89 OTHER CHEST PAIN: ICD-10-CM

## 2022-01-05 PROCEDURE — 99213 OFFICE O/P EST LOW 20 MIN: CPT | Performed by: INTERNAL MEDICINE

## 2022-01-05 RX ORDER — AMITRIPTYLINE HYDROCHLORIDE 10 MG/1
10 TABLET, FILM COATED ORAL NIGHTLY
COMMUNITY
Start: 2021-12-22 | End: 2023-03-27

## 2022-01-05 ASSESSMENT — ENCOUNTER SYMPTOMS
ABDOMINAL PAIN: 0
NEAR-SYNCOPE: 0
NAUSEA: 0
DYSPNEA ON EXERTION: 0
PALPITATIONS: 0
CLAUDICATION: 0
WEIGHT GAIN: 0
WEIGHT LOSS: 0
FEVER: 0
DIARRHEA: 0
FLANK PAIN: 0
COUGH: 0
ALTERED MENTAL STATUS: 0
HEARTBURN: 0
DEPRESSION: 0
BLURRED VISION: 0
VOMITING: 0
SYNCOPE: 0
IRREGULAR HEARTBEAT: 0
DIZZINESS: 0
ORTHOPNEA: 0
BACK PAIN: 0
CONSTIPATION: 0
SHORTNESS OF BREATH: 0
PND: 0
DECREASED APPETITE: 0

## 2022-01-05 NOTE — PROGRESS NOTES
Cardiology Note    Chief Complaint   Patient presents with   • Chest Pain     F/V Dx: Other chest pain       History of Present Illness: Dusty Lambert is a 31 y.o. female who presents for follow up for nonspecific chest pain.    Still with left sided sharp pain however less frequent and lasting short time. Not limiting function. Able to exercise. Still without clear aggravating or relieving factors. Underwent cardiac testing, see below.    Review of Systems   Constitutional: Negative for decreased appetite, fever, malaise/fatigue, weight gain and weight loss.   HENT: Negative for congestion and nosebleeds.    Eyes: Negative for blurred vision.   Cardiovascular: Negative for chest pain, claudication, dyspnea on exertion, irregular heartbeat, leg swelling, near-syncope, orthopnea, palpitations, paroxysmal nocturnal dyspnea and syncope.   Respiratory: Negative for cough and shortness of breath.    Endocrine: Negative for cold intolerance and heat intolerance.   Skin: Negative for rash.   Musculoskeletal: Negative for back pain.   Gastrointestinal: Negative for abdominal pain, constipation, diarrhea, heartburn, melena, nausea and vomiting.   Genitourinary: Negative for dysuria, flank pain and hematuria.   Neurological: Negative for dizziness.   Psychiatric/Behavioral: Negative for altered mental status and depression.         Past Medical History:   Diagnosis Date   • Cervical dysplasia    • Migraine    • Thyroid nodule     small left lobe nodule         Past Surgical History:   Procedure Laterality Date   • ORIF, ANKLE  3/2/2012    Performed by PIEDAD NEFF at [facility]   • TURBINOPLASTY  12/19/2008    Performed by EMILIANO LUNA at [facility]   • NASAL SEPTAL RECONSTRUCTION  12/19/2008    Performed by EMILIANO LUNA at [facility]   • NASAL SEPTAL RECONST  2006   • NASAL SEPTAL RECONSTRUCTION  2005   • TONSILLECTOMY AND ADENOIDECTOMY  1997   • OTHER  ORTHOPEDIC SURGERY           Current Outpatient Medications   Medication Sig Dispense Refill   • amitriptyline (ELAVIL) 10 MG Tab      • acetaminophen (TYLENOL) 325 MG Tab Take 650 mg by mouth as needed.     • ibuprofen (MOTRIN) 200 MG Tab Take 200 mg by mouth as needed.     • KURVELO 0.15-30 MG-MCG per tablet        No current facility-administered medications for this visit.         Allergies   Allergen Reactions   • Vancomycin Rash     Pt currently red and blotchy on anterior and posterior thoracic area from previous dose of Vanco. Tolerated vancomycin 6/29/18 without issue (suspect infusion related reaction).   • Other Food Hives     Cranberries and radishes   • Other Food Hives     Radishes (Raphanus Sativus)   • Sulfa Drugs Hives         Family History   Problem Relation Age of Onset   • Stroke Father    • Cancer Mother 52        breast         Social History     Socioeconomic History   • Marital status:      Spouse name: Not on file   • Number of children: 0   • Years of education: in college   • Highest education level: Not on file   Occupational History   • Occupation: College Student/[employer]      Employer: student   Tobacco Use   • Smoking status: Former Smoker     Quit date: 8/5/2011     Years since quitting: 10.4   • Smokeless tobacco: Never Used   • Tobacco comment: was smoking Hooka/hashish 3-4 times a week, quit 8/28/13   Vaping Use   • Vaping Use: Never used   Substance and Sexual Activity   • Alcohol use: Yes     Alcohol/week: 1.2 oz     Types: 2 Standard drinks or equivalent per week     Comment: 1-2x a week   • Drug use: No   • Sexual activity: Yes     Partners: Male     Birth control/protection: Condom, Pill   Other Topics Concern   • Not on file   Social History Narrative    ** Merged History Encounter **          Social Determinants of Health     Financial Resource Strain:    • Difficulty of Paying Living Expenses: Not on file   Food Insecurity:    • Worried About  "Running Out of Food in the Last Year: Not on file   • Ran Out of Food in the Last Year: Not on file   Transportation Needs:    • Lack of Transportation (Medical): Not on file   • Lack of Transportation (Non-Medical): Not on file   Physical Activity:    • Days of Exercise per Week: Not on file   • Minutes of Exercise per Session: Not on file   Stress:    • Feeling of Stress : Not on file   Social Connections:    • Frequency of Communication with Friends and Family: Not on file   • Frequency of Social Gatherings with Friends and Family: Not on file   • Attends Yarsanism Services: Not on file   • Active Member of Clubs or Organizations: Not on file   • Attends Club or Organization Meetings: Not on file   • Marital Status: Not on file   Intimate Partner Violence:    • Fear of Current or Ex-Partner: Not on file   • Emotionally Abused: Not on file   • Physically Abused: Not on file   • Sexually Abused: Not on file   Housing Stability:    • Unable to Pay for Housing in the Last Year: Not on file   • Number of Places Lived in the Last Year: Not on file   • Unstable Housing in the Last Year: Not on file         Physical Exam:  Ambulatory Vitals  /74 (BP Location: Left arm, Patient Position: Sitting, BP Cuff Size: Adult)   Pulse 71   Resp 14   Ht 1.702 m (5' 7\")   Wt 97.8 kg (215 lb 9.6 oz)   SpO2 95%    BP Readings from Last 4 Encounters:   01/05/22 110/74   11/10/21 118/78   10/13/21 108/60   08/05/21 106/82     Weight/BMI:   Vitals:    01/05/22 0742   BP: 110/74   Weight: 97.8 kg (215 lb 9.6 oz)   Height: 1.702 m (5' 7\")    Body mass index is 33.77 kg/m².  Wt Readings from Last 4 Encounters:   01/05/22 97.8 kg (215 lb 9.6 oz)   11/10/21 95.3 kg (210 lb)   10/13/21 95.3 kg (210 lb)   08/05/21 95.3 kg (210 lb)       Physical Exam  Constitutional:       General: She is not in acute distress.  HENT:      Head: Normocephalic and atraumatic.   Eyes:      Conjunctiva/sclera: Conjunctivae normal.      Pupils: Pupils are " equal, round, and reactive to light.   Neck:      Vascular: No JVD.   Cardiovascular:      Rate and Rhythm: Normal rate and regular rhythm.      Heart sounds: Normal heart sounds. No murmur heard.  No friction rub. No gallop.    Pulmonary:      Effort: Pulmonary effort is normal. No respiratory distress.      Breath sounds: Normal breath sounds. No wheezing or rales.   Chest:      Chest wall: No tenderness.   Abdominal:      General: Bowel sounds are normal. There is no distension.      Palpations: Abdomen is soft.   Musculoskeletal:      Cervical back: Normal range of motion and neck supple.   Skin:     General: Skin is warm and dry.   Neurological:      Mental Status: She is alert and oriented to person, place, and time.   Psychiatric:         Mood and Affect: Affect normal.         Judgment: Judgment normal.         Lab Data Review:  No results found for: CHOLSTRLTOT, LDL, HDL, TRIGLYCERIDE    Lab Results   Component Value Date/Time    SODIUM 137 09/16/2019 08:32 AM    POTASSIUM 3.3 (L) 09/16/2019 08:32 AM    CHLORIDE 107 09/16/2019 08:32 AM    CO2 23 09/16/2019 08:32 AM    GLUCOSE 110 (H) 09/16/2019 08:32 AM    BUN 11 09/16/2019 08:32 AM    CREATININE 0.59 09/16/2019 08:32 AM     CrCl cannot be calculated (Patient's most recent lab result is older than the maximum 7 days allowed.).  Lab Results   Component Value Date/Time    ALKPHOSPHAT 38 09/16/2019 08:32 AM    ASTSGOT 13 09/16/2019 08:32 AM    ALTSGPT 9 09/16/2019 08:32 AM    TBILIRUBIN 0.5 09/16/2019 08:32 AM      Lab Results   Component Value Date/Time    WBC 15.9 (H) 02/27/2020 08:27 AM    WBC 11.3 (H) 02/01/2010 03:11 PM     Lab Results   Component Value Date/Time    HBA1C 5.2 06/28/2018 09:53 AM     No components found for: TROP      Cardiac Imaging and Procedures Review:      EKG 11/10/21 interpreted by me sinus, LVH    TTE 12/21/21  CONCLUSIONS  Normal transthoracic echocardiogram study.    Stress echo 12/21/21  CONCLUSIONS  Negative stress  echocardiogram for ischemia.   Appropriate augmentation of left ventricular function after maximal exercise   with decrease in end-systolic   dimensions.  No ischemic changes with stress compared to baseline ECG.  Normal blood pressure response to exercise.   Yao treadmill score +10; low risk.    Medical Decision Making:  Problem List Items Addressed This Visit     Other chest pain        Improving symptoms with normal cardiac workup. No further cardiac testing.     It was my pleasure to meet with Ms. Lambert.

## 2022-01-20 ENCOUNTER — HOSPITAL ENCOUNTER (OUTPATIENT)
Dept: RADIOLOGY | Facility: MEDICAL CENTER | Age: 32
End: 2022-01-20

## 2022-01-20 ENCOUNTER — APPOINTMENT (OUTPATIENT)
Dept: RADIOLOGY | Facility: MEDICAL CENTER | Age: 32
End: 2022-01-20
Attending: FAMILY MEDICINE
Payer: COMMERCIAL

## 2022-01-20 DIAGNOSIS — E34.8: ICD-10-CM

## 2022-01-20 PROCEDURE — 70553 MRI BRAIN STEM W/O & W/DYE: CPT

## 2022-01-20 PROCEDURE — 700117 HCHG RX CONTRAST REV CODE 255: Performed by: FAMILY MEDICINE

## 2022-01-20 PROCEDURE — A9576 INJ PROHANCE MULTIPACK: HCPCS | Performed by: FAMILY MEDICINE

## 2022-01-20 RX ADMIN — GADOTERIDOL 20 ML: 279.3 INJECTION, SOLUTION INTRAVENOUS at 08:29

## 2022-02-01 ENCOUNTER — HOSPITAL ENCOUNTER (OUTPATIENT)
Dept: RADIOLOGY | Facility: MEDICAL CENTER | Age: 32
End: 2022-02-01
Attending: FAMILY MEDICINE
Payer: COMMERCIAL

## 2022-02-01 DIAGNOSIS — I72.0 ANEURYSM OF ARTERY OF NECK (HCC): ICD-10-CM

## 2022-02-01 PROCEDURE — 70544 MR ANGIOGRAPHY HEAD W/O DYE: CPT

## 2022-04-06 ENCOUNTER — PRE-ADMISSION TESTING (OUTPATIENT)
Dept: ADMISSIONS | Facility: MEDICAL CENTER | Age: 32
DRG: 039 | End: 2022-04-06
Attending: NEUROLOGICAL SURGERY
Payer: COMMERCIAL

## 2022-04-06 DIAGNOSIS — Z01.812 PRE-OPERATIVE LABORATORY EXAMINATION: ICD-10-CM

## 2022-04-06 DIAGNOSIS — Z01.810 PRE-OPERATIVE CARDIOVASCULAR EXAMINATION: ICD-10-CM

## 2022-04-06 LAB
ABO GROUP BLD: NORMAL
ANION GAP SERPL CALC-SCNC: 12 MMOL/L (ref 7–16)
APTT PPP: 30.1 SEC (ref 24.7–36)
BASOPHILS # BLD AUTO: 0.7 % (ref 0–1.8)
BASOPHILS # BLD: 0.03 K/UL (ref 0–0.12)
BLD GP AB SCN SERPL QL: NORMAL
BUN SERPL-MCNC: 15 MG/DL (ref 8–22)
CALCIUM SERPL-MCNC: 8.8 MG/DL (ref 8.5–10.5)
CHLORIDE SERPL-SCNC: 102 MMOL/L (ref 96–112)
CO2 SERPL-SCNC: 24 MMOL/L (ref 20–33)
CREAT SERPL-MCNC: 0.86 MG/DL (ref 0.5–1.4)
EOSINOPHIL # BLD AUTO: 0.09 K/UL (ref 0–0.51)
EOSINOPHIL NFR BLD: 2.1 % (ref 0–6.9)
ERYTHROCYTE [DISTWIDTH] IN BLOOD BY AUTOMATED COUNT: 43.7 FL (ref 35.9–50)
GFR SERPLBLD CREATININE-BSD FMLA CKD-EPI: 92 ML/MIN/1.73 M 2
GLUCOSE SERPL-MCNC: 75 MG/DL (ref 65–99)
HCT VFR BLD AUTO: 47.2 % (ref 37–47)
HGB BLD-MCNC: 15.4 G/DL (ref 12–16)
IMM GRANULOCYTES # BLD AUTO: 0.02 K/UL (ref 0–0.11)
IMM GRANULOCYTES NFR BLD AUTO: 0.5 % (ref 0–0.9)
INR PPP: 1.1 (ref 0.87–1.13)
LYMPHOCYTES # BLD AUTO: 1.35 K/UL (ref 1–4.8)
LYMPHOCYTES NFR BLD: 31.2 % (ref 22–41)
MCH RBC QN AUTO: 30.4 PG (ref 27–33)
MCHC RBC AUTO-ENTMCNC: 32.6 G/DL (ref 33.6–35)
MCV RBC AUTO: 93.3 FL (ref 81.4–97.8)
MONOCYTES # BLD AUTO: 0.36 K/UL (ref 0–0.85)
MONOCYTES NFR BLD AUTO: 8.3 % (ref 0–13.4)
NEUTROPHILS # BLD AUTO: 2.48 K/UL (ref 2–7.15)
NEUTROPHILS NFR BLD: 57.2 % (ref 44–72)
NRBC # BLD AUTO: 0 K/UL
NRBC BLD-RTO: 0 /100 WBC
PLATELET # BLD AUTO: 282 K/UL (ref 164–446)
PMV BLD AUTO: 10.2 FL (ref 9–12.9)
POTASSIUM SERPL-SCNC: 3.7 MMOL/L (ref 3.6–5.5)
PROTHROMBIN TIME: 13.9 SEC (ref 12–14.6)
RBC # BLD AUTO: 5.06 M/UL (ref 4.2–5.4)
RH BLD: NORMAL
SARS-COV-2 RNA RESP QL NAA+PROBE: NOTDETECTED
SODIUM SERPL-SCNC: 138 MMOL/L (ref 135–145)
SPECIMEN SOURCE: NORMAL
WBC # BLD AUTO: 4.3 K/UL (ref 4.8–10.8)

## 2022-04-06 PROCEDURE — 85730 THROMBOPLASTIN TIME PARTIAL: CPT

## 2022-04-06 PROCEDURE — 85610 PROTHROMBIN TIME: CPT

## 2022-04-06 PROCEDURE — 86900 BLOOD TYPING SEROLOGIC ABO: CPT

## 2022-04-06 PROCEDURE — 86850 RBC ANTIBODY SCREEN: CPT

## 2022-04-06 PROCEDURE — 80048 BASIC METABOLIC PNL TOTAL CA: CPT

## 2022-04-06 PROCEDURE — 36415 COLL VENOUS BLD VENIPUNCTURE: CPT

## 2022-04-06 PROCEDURE — U0003 INFECTIOUS AGENT DETECTION BY NUCLEIC ACID (DNA OR RNA); SEVERE ACUTE RESPIRATORY SYNDROME CORONAVIRUS 2 (SARS-COV-2) (CORONAVIRUS DISEASE [COVID-19]), AMPLIFIED PROBE TECHNIQUE, MAKING USE OF HIGH THROUGHPUT TECHNOLOGIES AS DESCRIBED BY CMS-2020-01-R: HCPCS

## 2022-04-06 PROCEDURE — 85025 COMPLETE CBC W/AUTO DIFF WBC: CPT

## 2022-04-06 PROCEDURE — U0005 INFEC AGEN DETEC AMPLI PROBE: HCPCS

## 2022-04-06 PROCEDURE — 86901 BLOOD TYPING SEROLOGIC RH(D): CPT

## 2022-04-06 PROCEDURE — C9803 HOPD COVID-19 SPEC COLLECT: HCPCS

## 2022-04-06 RX ORDER — CLOPIDOGREL BISULFATE 75 MG/1
75 TABLET ORAL DAILY
COMMUNITY
End: 2023-03-27

## 2022-04-06 RX ORDER — ASPIRIN 81 MG/1
81 TABLET, CHEWABLE ORAL DAILY
COMMUNITY

## 2022-04-12 ENCOUNTER — ANESTHESIA EVENT (OUTPATIENT)
Dept: RADIOLOGY | Facility: MEDICAL CENTER | Age: 32
DRG: 039 | End: 2022-04-12
Payer: COMMERCIAL

## 2022-04-12 NOTE — OR NURSING
COVID-19 Pre-surgery screening:     ?     Left message for Pt to call back if any Covid symptoms are present, also advised of mask/visitor policies.

## 2022-04-12 NOTE — OR NURSING
COVID-19 Pre-surgery screening:  ?     1. Do you have an undiagnosed respiratory illness or symptoms such as coughing or sneezing?      No     · Onset of Sx:  N/A  · Acute vs. chronic respiratory illness: N/A     2. Do you have an unexplained fever greater than 100.4 degrees Fahrenheit or 38 degrees Celsius?                  No     3. Have you had direct exposure to a patient who tested positive for Covid-19?                  No  ?  4. Have you had any loss of your sense of taste or smell? Have you had N/V or sore throat?     No     Patient has been informed of visitor policy and asked to wear a mask upon entering the hospital

## 2022-04-13 ENCOUNTER — APPOINTMENT (OUTPATIENT)
Dept: RADIOLOGY | Facility: MEDICAL CENTER | Age: 32
DRG: 039 | End: 2022-04-13
Attending: NEUROLOGICAL SURGERY
Payer: COMMERCIAL

## 2022-04-13 ENCOUNTER — ANESTHESIA (OUTPATIENT)
Dept: RADIOLOGY | Facility: MEDICAL CENTER | Age: 32
DRG: 039 | End: 2022-04-13
Payer: COMMERCIAL

## 2022-04-13 ENCOUNTER — HOSPITAL ENCOUNTER (INPATIENT)
Facility: MEDICAL CENTER | Age: 32
LOS: 1 days | DRG: 039 | End: 2022-04-14
Attending: NEUROLOGICAL SURGERY | Admitting: NEUROLOGICAL SURGERY
Payer: COMMERCIAL

## 2022-04-13 DIAGNOSIS — I67.1 CEREBRAL ANEURYSM, NONRUPTURED: Primary | ICD-10-CM

## 2022-04-13 PROBLEM — I72.9 ANEURYSM (HCC): Status: ACTIVE | Noted: 2022-04-13

## 2022-04-13 LAB
ABO + RH BLD: NORMAL
ACT BLD: 208 SEC (ref 74–137)
ACT BLD: 208 SEC (ref 74–137)
ACT BLD: 214 SEC (ref 74–137)
ACT BLD: 214 SEC (ref 74–137)
ACT BLD: 220 SEC (ref 74–137)
ACT BLD: 220 SEC (ref 74–137)
ACT BLD: 226 SEC (ref 74–137)
ACT BLD: 231 SEC (ref 74–137)
GLUCOSE BLD STRIP.AUTO-MCNC: 88 MG/DL (ref 65–99)
HCG SERPL QL: NEGATIVE

## 2022-04-13 PROCEDURE — 96374 THER/PROPH/DIAG INJ IV PUSH: CPT

## 2022-04-13 PROCEDURE — 700105 HCHG RX REV CODE 258: Performed by: STUDENT IN AN ORGANIZED HEALTH CARE EDUCATION/TRAINING PROGRAM

## 2022-04-13 PROCEDURE — 160036 HCHG PACU - EA ADDL 30 MINS PHASE I

## 2022-04-13 PROCEDURE — 700105 HCHG RX REV CODE 258: Performed by: NEUROLOGICAL SURGERY

## 2022-04-13 PROCEDURE — 61624 TCAT PERM OCCLS/EMBOLJ CNS: CPT

## 2022-04-13 PROCEDURE — 770022 HCHG ROOM/CARE - ICU (200)

## 2022-04-13 PROCEDURE — 700117 HCHG RX CONTRAST REV CODE 255: Performed by: NEUROLOGICAL SURGERY

## 2022-04-13 PROCEDURE — 160035 HCHG PACU - 1ST 60 MINS PHASE I

## 2022-04-13 PROCEDURE — 700101 HCHG RX REV CODE 250: Performed by: STUDENT IN AN ORGANIZED HEALTH CARE EDUCATION/TRAINING PROGRAM

## 2022-04-13 PROCEDURE — A9270 NON-COVERED ITEM OR SERVICE: HCPCS | Performed by: STUDENT IN AN ORGANIZED HEALTH CARE EDUCATION/TRAINING PROGRAM

## 2022-04-13 PROCEDURE — 96375 TX/PRO/DX INJ NEW DRUG ADDON: CPT

## 2022-04-13 PROCEDURE — B3141ZZ FLUOROSCOPY OF LEFT COMMON CAROTID ARTERY USING LOW OSMOLAR CONTRAST: ICD-10-PCS | Performed by: NEUROLOGICAL SURGERY

## 2022-04-13 PROCEDURE — 84703 CHORIONIC GONADOTROPIN ASSAY: CPT

## 2022-04-13 PROCEDURE — 36620 INSERTION CATHETER ARTERY: CPT | Performed by: STUDENT IN AN ORGANIZED HEALTH CARE EDUCATION/TRAINING PROGRAM

## 2022-04-13 PROCEDURE — 01926 ANES IVNTL RAD ICR ICAR/AORT: CPT | Performed by: STUDENT IN AN ORGANIZED HEALTH CARE EDUCATION/TRAINING PROGRAM

## 2022-04-13 PROCEDURE — 76937 US GUIDE VASCULAR ACCESS: CPT | Mod: 26 | Performed by: STUDENT IN AN ORGANIZED HEALTH CARE EDUCATION/TRAINING PROGRAM

## 2022-04-13 PROCEDURE — B3171ZZ FLUOROSCOPY OF LEFT INTERNAL CAROTID ARTERY USING LOW OSMOLAR CONTRAST: ICD-10-PCS | Performed by: NEUROLOGICAL SURGERY

## 2022-04-13 PROCEDURE — 700111 HCHG RX REV CODE 636 W/ 250 OVERRIDE (IP): Performed by: STUDENT IN AN ORGANIZED HEALTH CARE EDUCATION/TRAINING PROGRAM

## 2022-04-13 PROCEDURE — 160002 HCHG RECOVERY MINUTES (STAT)

## 2022-04-13 PROCEDURE — A9270 NON-COVERED ITEM OR SERVICE: HCPCS | Performed by: NEUROLOGICAL SURGERY

## 2022-04-13 PROCEDURE — 700111 HCHG RX REV CODE 636 W/ 250 OVERRIDE (IP): Performed by: INTERNAL MEDICINE

## 2022-04-13 PROCEDURE — 700111 HCHG RX REV CODE 636 W/ 250 OVERRIDE (IP): Performed by: NEUROLOGICAL SURGERY

## 2022-04-13 PROCEDURE — 03VL3HZ RESTRICTION OF LEFT INTERNAL CAROTID ARTERY WITH INTRALUMINAL DEVICE, FLOW DIVERTER, PERCUTANEOUS APPROACH: ICD-10-PCS | Performed by: NEUROLOGICAL SURGERY

## 2022-04-13 PROCEDURE — 99223 1ST HOSP IP/OBS HIGH 75: CPT | Performed by: INTERNAL MEDICINE

## 2022-04-13 PROCEDURE — 700102 HCHG RX REV CODE 250 W/ 637 OVERRIDE(OP): Performed by: STUDENT IN AN ORGANIZED HEALTH CARE EDUCATION/TRAINING PROGRAM

## 2022-04-13 PROCEDURE — 700102 HCHG RX REV CODE 250 W/ 637 OVERRIDE(OP): Performed by: NEUROLOGICAL SURGERY

## 2022-04-13 PROCEDURE — 96376 TX/PRO/DX INJ SAME DRUG ADON: CPT

## 2022-04-13 PROCEDURE — 75898 FOLLOW-UP ANGIOGRAPHY: CPT

## 2022-04-13 PROCEDURE — 82962 GLUCOSE BLOOD TEST: CPT

## 2022-04-13 PROCEDURE — G0378 HOSPITAL OBSERVATION PER HR: HCPCS

## 2022-04-13 PROCEDURE — 36415 COLL VENOUS BLD VENIPUNCTURE: CPT

## 2022-04-13 PROCEDURE — 85347 COAGULATION TIME ACTIVATED: CPT | Mod: 91

## 2022-04-13 RX ORDER — SCOLOPAMINE TRANSDERMAL SYSTEM 1 MG/1
PATCH, EXTENDED RELEASE TRANSDERMAL
Status: COMPLETED
Start: 2022-04-13 | End: 2022-04-13

## 2022-04-13 RX ORDER — BISACODYL 10 MG
10 SUPPOSITORY, RECTAL RECTAL
Status: DISCONTINUED | OUTPATIENT
Start: 2022-04-13 | End: 2022-04-14 | Stop reason: HOSPADM

## 2022-04-13 RX ORDER — ONDANSETRON 2 MG/ML
4 INJECTION INTRAMUSCULAR; INTRAVENOUS
Status: COMPLETED | OUTPATIENT
Start: 2022-04-13 | End: 2022-04-13

## 2022-04-13 RX ORDER — DIPHENHYDRAMINE HYDROCHLORIDE 50 MG/ML
25 INJECTION INTRAMUSCULAR; INTRAVENOUS EVERY 6 HOURS PRN
Status: DISCONTINUED | OUTPATIENT
Start: 2022-04-13 | End: 2022-04-14 | Stop reason: HOSPADM

## 2022-04-13 RX ORDER — HYDROMORPHONE HYDROCHLORIDE 1 MG/ML
0.1 INJECTION, SOLUTION INTRAMUSCULAR; INTRAVENOUS; SUBCUTANEOUS
Status: DISCONTINUED | OUTPATIENT
Start: 2022-04-13 | End: 2022-04-13 | Stop reason: HOSPADM

## 2022-04-13 RX ORDER — HYDRALAZINE HYDROCHLORIDE 20 MG/ML
10 INJECTION INTRAMUSCULAR; INTRAVENOUS
Status: DISCONTINUED | OUTPATIENT
Start: 2022-04-13 | End: 2022-04-14 | Stop reason: HOSPADM

## 2022-04-13 RX ORDER — POLYETHYLENE GLYCOL 3350 17 G/17G
1 POWDER, FOR SOLUTION ORAL 2 TIMES DAILY PRN
Status: DISCONTINUED | OUTPATIENT
Start: 2022-04-13 | End: 2022-04-14 | Stop reason: HOSPADM

## 2022-04-13 RX ORDER — OXYCODONE HYDROCHLORIDE 5 MG/1
10 TABLET ORAL
Status: DISCONTINUED | OUTPATIENT
Start: 2022-04-13 | End: 2022-04-14 | Stop reason: HOSPADM

## 2022-04-13 RX ORDER — ASPIRIN 81 MG/1
81 TABLET, CHEWABLE ORAL DAILY
Status: DISCONTINUED | OUTPATIENT
Start: 2022-04-14 | End: 2022-04-14 | Stop reason: HOSPADM

## 2022-04-13 RX ORDER — AMITRIPTYLINE HYDROCHLORIDE 10 MG/1
10 TABLET, FILM COATED ORAL NIGHTLY
Status: DISCONTINUED | OUTPATIENT
Start: 2022-04-13 | End: 2022-04-14 | Stop reason: HOSPADM

## 2022-04-13 RX ORDER — HEPARIN SODIUM 1000 [USP'U]/ML
INJECTION, SOLUTION INTRAVENOUS; SUBCUTANEOUS
Status: COMPLETED
Start: 2022-04-13 | End: 2022-04-13

## 2022-04-13 RX ORDER — ENEMA 19; 7 G/133ML; G/133ML
1 ENEMA RECTAL
Status: DISCONTINUED | OUTPATIENT
Start: 2022-04-13 | End: 2022-04-14 | Stop reason: HOSPADM

## 2022-04-13 RX ORDER — SCOLOPAMINE TRANSDERMAL SYSTEM 1 MG/1
1 PATCH, EXTENDED RELEASE TRANSDERMAL
Status: DISCONTINUED | OUTPATIENT
Start: 2022-04-13 | End: 2022-04-14 | Stop reason: HOSPADM

## 2022-04-13 RX ORDER — ACETAMINOPHEN 325 MG/1
TABLET ORAL PRN
Status: DISCONTINUED | OUTPATIENT
Start: 2022-04-13 | End: 2022-04-13 | Stop reason: SURG

## 2022-04-13 RX ORDER — LIDOCAINE HYDROCHLORIDE 40 MG/ML
SOLUTION TOPICAL PRN
Status: DISCONTINUED | OUTPATIENT
Start: 2022-04-13 | End: 2022-04-13 | Stop reason: SURG

## 2022-04-13 RX ORDER — DIPHENHYDRAMINE HYDROCHLORIDE 50 MG/ML
12.5 INJECTION INTRAMUSCULAR; INTRAVENOUS
Status: DISCONTINUED | OUTPATIENT
Start: 2022-04-13 | End: 2022-04-13 | Stop reason: HOSPADM

## 2022-04-13 RX ORDER — HYDROMORPHONE HYDROCHLORIDE 1 MG/ML
0.4 INJECTION, SOLUTION INTRAMUSCULAR; INTRAVENOUS; SUBCUTANEOUS
Status: DISCONTINUED | OUTPATIENT
Start: 2022-04-13 | End: 2022-04-13 | Stop reason: HOSPADM

## 2022-04-13 RX ORDER — OXYCODONE HCL 5 MG/5 ML
5 SOLUTION, ORAL ORAL
Status: DISCONTINUED | OUTPATIENT
Start: 2022-04-13 | End: 2022-04-13 | Stop reason: HOSPADM

## 2022-04-13 RX ORDER — ACETAMINOPHEN 500 MG
1000 TABLET ORAL EVERY 6 HOURS
Status: DISCONTINUED | OUTPATIENT
Start: 2022-04-13 | End: 2022-04-14 | Stop reason: HOSPADM

## 2022-04-13 RX ORDER — ACETAMINOPHEN 500 MG
1000 TABLET ORAL EVERY 6 HOURS PRN
Status: DISCONTINUED | OUTPATIENT
Start: 2022-04-18 | End: 2022-04-14 | Stop reason: HOSPADM

## 2022-04-13 RX ORDER — ONDANSETRON 2 MG/ML
INJECTION INTRAMUSCULAR; INTRAVENOUS PRN
Status: DISCONTINUED | OUTPATIENT
Start: 2022-04-13 | End: 2022-04-13 | Stop reason: SURG

## 2022-04-13 RX ORDER — ACETAMINOPHEN 500 MG
TABLET ORAL
Status: COMPLETED
Start: 2022-04-13 | End: 2022-04-13

## 2022-04-13 RX ORDER — MORPHINE SULFATE 4 MG/ML
4 INJECTION INTRAVENOUS
Status: DISCONTINUED | OUTPATIENT
Start: 2022-04-13 | End: 2022-04-14 | Stop reason: HOSPADM

## 2022-04-13 RX ORDER — PHENYLEPHRINE HYDROCHLORIDE 10 MG/ML
INJECTION, SOLUTION INTRAMUSCULAR; INTRAVENOUS; SUBCUTANEOUS
Status: DISPENSED
Start: 2022-04-13 | End: 2022-04-13

## 2022-04-13 RX ORDER — LEVONORGESTREL AND ETHINYL ESTRADIOL 0.15-0.03
1 KIT ORAL DAILY
Status: DISCONTINUED | OUTPATIENT
Start: 2022-04-13 | End: 2022-04-13

## 2022-04-13 RX ORDER — AMOXICILLIN 250 MG
1 CAPSULE ORAL
Status: DISCONTINUED | OUTPATIENT
Start: 2022-04-13 | End: 2022-04-14 | Stop reason: HOSPADM

## 2022-04-13 RX ORDER — OXYCODONE HYDROCHLORIDE 5 MG/1
5 TABLET ORAL
Status: DISCONTINUED | OUTPATIENT
Start: 2022-04-13 | End: 2022-04-14 | Stop reason: HOSPADM

## 2022-04-13 RX ORDER — DOCUSATE SODIUM 100 MG/1
100 CAPSULE, LIQUID FILLED ORAL 2 TIMES DAILY
Status: DISCONTINUED | OUTPATIENT
Start: 2022-04-13 | End: 2022-04-14 | Stop reason: HOSPADM

## 2022-04-13 RX ORDER — CEFAZOLIN SODIUM 1 G/3ML
INJECTION, POWDER, FOR SOLUTION INTRAMUSCULAR; INTRAVENOUS PRN
Status: DISCONTINUED | OUTPATIENT
Start: 2022-04-13 | End: 2022-04-13 | Stop reason: SURG

## 2022-04-13 RX ORDER — HALOPERIDOL 5 MG/ML
1 INJECTION INTRAMUSCULAR
Status: DISCONTINUED | OUTPATIENT
Start: 2022-04-13 | End: 2022-04-13 | Stop reason: HOSPADM

## 2022-04-13 RX ORDER — LABETALOL HYDROCHLORIDE 5 MG/ML
10 INJECTION, SOLUTION INTRAVENOUS
Status: DISCONTINUED | OUTPATIENT
Start: 2022-04-13 | End: 2022-04-14 | Stop reason: HOSPADM

## 2022-04-13 RX ORDER — METOCLOPRAMIDE HYDROCHLORIDE 5 MG/ML
5 INJECTION INTRAMUSCULAR; INTRAVENOUS EVERY 6 HOURS PRN
Status: DISCONTINUED | OUTPATIENT
Start: 2022-04-13 | End: 2022-04-14 | Stop reason: HOSPADM

## 2022-04-13 RX ORDER — DEXAMETHASONE SODIUM PHOSPHATE 4 MG/ML
INJECTION, SOLUTION INTRA-ARTICULAR; INTRALESIONAL; INTRAMUSCULAR; INTRAVENOUS; SOFT TISSUE PRN
Status: DISCONTINUED | OUTPATIENT
Start: 2022-04-13 | End: 2022-04-13 | Stop reason: SURG

## 2022-04-13 RX ORDER — CLOPIDOGREL BISULFATE 75 MG/1
75 TABLET ORAL DAILY
Status: DISCONTINUED | OUTPATIENT
Start: 2022-04-14 | End: 2022-04-14 | Stop reason: HOSPADM

## 2022-04-13 RX ORDER — LIDOCAINE HYDROCHLORIDE 20 MG/ML
INJECTION, SOLUTION EPIDURAL; INFILTRATION; INTRACAUDAL; PERINEURAL PRN
Status: DISCONTINUED | OUTPATIENT
Start: 2022-04-13 | End: 2022-04-13 | Stop reason: SURG

## 2022-04-13 RX ORDER — ONDANSETRON 2 MG/ML
4 INJECTION INTRAMUSCULAR; INTRAVENOUS EVERY 4 HOURS PRN
Status: DISCONTINUED | OUTPATIENT
Start: 2022-04-13 | End: 2022-04-14 | Stop reason: HOSPADM

## 2022-04-13 RX ORDER — CLONIDINE HYDROCHLORIDE 0.1 MG/1
0.1 TABLET ORAL EVERY 4 HOURS PRN
Status: DISCONTINUED | OUTPATIENT
Start: 2022-04-13 | End: 2022-04-14 | Stop reason: HOSPADM

## 2022-04-13 RX ORDER — HEPARIN SODIUM,PORCINE 1000/ML
VIAL (ML) INJECTION PRN
Status: DISCONTINUED | OUTPATIENT
Start: 2022-04-13 | End: 2022-04-13 | Stop reason: SURG

## 2022-04-13 RX ORDER — HYDROMORPHONE HYDROCHLORIDE 1 MG/ML
0.2 INJECTION, SOLUTION INTRAMUSCULAR; INTRAVENOUS; SUBCUTANEOUS
Status: DISCONTINUED | OUTPATIENT
Start: 2022-04-13 | End: 2022-04-13 | Stop reason: HOSPADM

## 2022-04-13 RX ORDER — SCOLOPAMINE TRANSDERMAL SYSTEM 1 MG/1
PATCH, EXTENDED RELEASE TRANSDERMAL PRN
Status: DISCONTINUED | OUTPATIENT
Start: 2022-04-13 | End: 2022-04-13 | Stop reason: SURG

## 2022-04-13 RX ORDER — SODIUM CHLORIDE, SODIUM LACTATE, POTASSIUM CHLORIDE, CALCIUM CHLORIDE 600; 310; 30; 20 MG/100ML; MG/100ML; MG/100ML; MG/100ML
INJECTION, SOLUTION INTRAVENOUS CONTINUOUS
Status: ACTIVE | OUTPATIENT
Start: 2022-04-13 | End: 2022-04-13

## 2022-04-13 RX ORDER — OXYCODONE HCL 5 MG/5 ML
10 SOLUTION, ORAL ORAL
Status: DISCONTINUED | OUTPATIENT
Start: 2022-04-13 | End: 2022-04-13 | Stop reason: HOSPADM

## 2022-04-13 RX ORDER — MIDAZOLAM HYDROCHLORIDE 1 MG/ML
INJECTION INTRAMUSCULAR; INTRAVENOUS
Status: COMPLETED
Start: 2022-04-13 | End: 2022-04-13

## 2022-04-13 RX ORDER — AMOXICILLIN 250 MG
1 CAPSULE ORAL NIGHTLY
Status: DISCONTINUED | OUTPATIENT
Start: 2022-04-13 | End: 2022-04-14 | Stop reason: HOSPADM

## 2022-04-13 RX ADMIN — SENNOSIDES AND DOCUSATE SODIUM 1 TABLET: 50; 8.6 TABLET ORAL at 20:46

## 2022-04-13 RX ADMIN — ONDANSETRON 4 MG: 2 INJECTION INTRAMUSCULAR; INTRAVENOUS at 16:46

## 2022-04-13 RX ADMIN — HEPARIN SODIUM 500 UNITS: 1000 INJECTION, SOLUTION INTRAVENOUS; SUBCUTANEOUS at 13:42

## 2022-04-13 RX ADMIN — LIDOCAINE HYDROCHLORIDE 50 MG: 20 INJECTION, SOLUTION EPIDURAL; INFILTRATION; INTRACAUDAL at 15:54

## 2022-04-13 RX ADMIN — SODIUM CHLORIDE, POTASSIUM CHLORIDE, SODIUM LACTATE AND CALCIUM CHLORIDE: 600; 310; 30; 20 INJECTION, SOLUTION INTRAVENOUS at 10:56

## 2022-04-13 RX ADMIN — FENTANYL CITRATE 50 MCG: 50 INJECTION, SOLUTION INTRAMUSCULAR; INTRAVENOUS at 12:28

## 2022-04-13 RX ADMIN — HEPARIN SODIUM 1000 UNITS: 1000 INJECTION, SOLUTION INTRAVENOUS; SUBCUTANEOUS at 12:50

## 2022-04-13 RX ADMIN — ROCURONIUM BROMIDE 10 MG: 10 INJECTION, SOLUTION INTRAVENOUS at 14:11

## 2022-04-13 RX ADMIN — AMITRIPTYLINE HYDROCHLORIDE 10 MG: 10 TABLET, FILM COATED ORAL at 20:46

## 2022-04-13 RX ADMIN — PROPOFOL 180 MG: 10 INJECTION, EMULSION INTRAVENOUS at 11:56

## 2022-04-13 RX ADMIN — FENTANYL CITRATE 100 MCG: 50 INJECTION, SOLUTION INTRAMUSCULAR; INTRAVENOUS at 11:56

## 2022-04-13 RX ADMIN — HEPARIN SODIUM 1000 UNITS: 1000 INJECTION, SOLUTION INTRAVENOUS; SUBCUTANEOUS at 14:33

## 2022-04-13 RX ADMIN — ROCURONIUM BROMIDE 10 MG: 10 INJECTION, SOLUTION INTRAVENOUS at 13:28

## 2022-04-13 RX ADMIN — DIPHENHYDRAMINE HYDROCHLORIDE 25 MG: 50 INJECTION INTRAMUSCULAR; INTRAVENOUS at 20:34

## 2022-04-13 RX ADMIN — METOCLOPRAMIDE 5 MG: 5 INJECTION, SOLUTION INTRAMUSCULAR; INTRAVENOUS at 18:02

## 2022-04-13 RX ADMIN — HEPARIN SODIUM 500 UNITS: 1000 INJECTION, SOLUTION INTRAVENOUS; SUBCUTANEOUS at 15:19

## 2022-04-13 RX ADMIN — IOHEXOL 125 ML: 300 INJECTION, SOLUTION INTRAVENOUS at 16:30

## 2022-04-13 RX ADMIN — FENTANYL CITRATE 50 MCG: 50 INJECTION, SOLUTION INTRAMUSCULAR; INTRAVENOUS at 15:56

## 2022-04-13 RX ADMIN — ACETAMINOPHEN 1000 MG: 325 TABLET, FILM COATED ORAL at 11:25

## 2022-04-13 RX ADMIN — FENTANYL CITRATE 25 MCG: 50 INJECTION, SOLUTION INTRAMUSCULAR; INTRAVENOUS at 16:50

## 2022-04-13 RX ADMIN — FENTANYL CITRATE 50 MCG: 50 INJECTION, SOLUTION INTRAMUSCULAR; INTRAVENOUS at 15:24

## 2022-04-13 RX ADMIN — LIDOCAINE HYDROCHLORIDE 50 MG: 20 INJECTION, SOLUTION EPIDURAL; INFILTRATION; INTRACAUDAL at 15:58

## 2022-04-13 RX ADMIN — LIDOCAINE HYDROCHLORIDE 40 MG: 20 INJECTION, SOLUTION EPIDURAL; INFILTRATION; INTRACAUDAL at 11:56

## 2022-04-13 RX ADMIN — HEPARIN SODIUM 1000 UNITS: 1000 INJECTION, SOLUTION INTRAVENOUS; SUBCUTANEOUS at 13:06

## 2022-04-13 RX ADMIN — ONDANSETRON 4 MG: 2 INJECTION INTRAMUSCULAR; INTRAVENOUS at 19:26

## 2022-04-13 RX ADMIN — HEPARIN SODIUM 500 UNITS: 1000 INJECTION, SOLUTION INTRAVENOUS; SUBCUTANEOUS at 14:55

## 2022-04-13 RX ADMIN — DEXAMETHASONE SODIUM PHOSPHATE 4 MG: 4 INJECTION, SOLUTION INTRA-ARTICULAR; INTRALESIONAL; INTRAMUSCULAR; INTRAVENOUS; SOFT TISSUE at 12:15

## 2022-04-13 RX ADMIN — ONDANSETRON 4 MG: 2 INJECTION INTRAMUSCULAR; INTRAVENOUS at 15:47

## 2022-04-13 RX ADMIN — ROCURONIUM BROMIDE 70 MG: 10 INJECTION, SOLUTION INTRAVENOUS at 11:56

## 2022-04-13 RX ADMIN — LIDOCAINE HYDROCHLORIDE 4 ML: 40 SOLUTION TOPICAL at 11:59

## 2022-04-13 RX ADMIN — OXYCODONE HYDROCHLORIDE 5 MG: 5 TABLET ORAL at 18:22

## 2022-04-13 RX ADMIN — PHENYLEPHRINE HYDROCHLORIDE 30 MCG/MIN: 10 INJECTION INTRAVENOUS at 12:09

## 2022-04-13 RX ADMIN — ROCURONIUM BROMIDE 10 MG: 10 INJECTION, SOLUTION INTRAVENOUS at 14:46

## 2022-04-13 RX ADMIN — ACETAMINOPHEN 1000 MG: 500 TABLET ORAL at 23:05

## 2022-04-13 RX ADMIN — SCOPALAMINE 1 PATCH: 1 PATCH, EXTENDED RELEASE TRANSDERMAL at 11:25

## 2022-04-13 RX ADMIN — MIDAZOLAM 2 MG: 1 INJECTION INTRAMUSCULAR; INTRAVENOUS at 11:51

## 2022-04-13 RX ADMIN — HEPARIN SODIUM 1000 UNITS: 1000 INJECTION, SOLUTION INTRAVENOUS; SUBCUTANEOUS at 14:07

## 2022-04-13 RX ADMIN — SUGAMMADEX 200 MG: 100 INJECTION, SOLUTION INTRAVENOUS at 15:59

## 2022-04-13 RX ADMIN — CEFAZOLIN 2 G: 330 INJECTION, POWDER, FOR SOLUTION INTRAMUSCULAR; INTRAVENOUS at 12:13

## 2022-04-13 RX ADMIN — HEPARIN SODIUM 5000 UNITS: 1000 INJECTION, SOLUTION INTRAVENOUS; SUBCUTANEOUS at 12:29

## 2022-04-13 ASSESSMENT — PAIN DESCRIPTION - PAIN TYPE
TYPE: ACUTE PAIN;SURGICAL PAIN

## 2022-04-13 ASSESSMENT — ENCOUNTER SYMPTOMS
SEIZURES: 0
NAUSEA: 1
COUGH: 0
HALLUCINATIONS: 0
INSOMNIA: 0
SENSORY CHANGE: 0
CONSTIPATION: 0
ABDOMINAL PAIN: 1
WEIGHT LOSS: 0
WEAKNESS: 0
FOCAL WEAKNESS: 0
SPUTUM PRODUCTION: 0
SHORTNESS OF BREATH: 0
NERVOUS/ANXIOUS: 0
CLAUDICATION: 0
BACK PAIN: 0
BLOOD IN STOOL: 0
TINGLING: 0
DEPRESSION: 0
HEADACHES: 0
DOUBLE VISION: 0
SINUS PAIN: 0
FEVER: 0
MYALGIAS: 0
VOMITING: 0
SPEECH CHANGE: 0
EYE PAIN: 0
DIARRHEA: 0
EYE DISCHARGE: 0
BLURRED VISION: 0
TREMORS: 0

## 2022-04-13 ASSESSMENT — LIFESTYLE VARIABLES: SUBSTANCE_ABUSE: 0

## 2022-04-13 NOTE — PROGRESS NOTES
IR Nursing Note:    Patient underwent a Cerebral angiogram with placement of a flow diverter into the left internal carotid artery aneurym by Dr. Jensen anesthesia by Dr. Fulton. Procedure site was marked by MD and verified using imaging guidance.  Patient was placed in a supine position.  Pre-procedure +2 pedal pulses, patient BALL.  Anesthesia was induced without incident.  Left radial arterial line was placed and a temp probe jackson.  Right femoral artery was accessed. Perclose was used to achieve hemostasis.   A Tegaderm and gauze dressing was placed over surgical site. Report called to Linda DOAN. Pt transported by ajit with anesthesia and RN to [facility]..    Aamir Surpass Evolve Flow Diverter System Left internal Carotid Artery aneurysm 4.0 mm x 12 mm Ref # [id number] Lot # [id number] Expiration 02-    [employer]ott Perclose Prostyle Re f# [id number] Lot # [id number]

## 2022-04-13 NOTE — ANESTHESIA PROCEDURE NOTES
Airway    Date/Time: 4/13/2022 11:59 AM  Performed by: Lg Fulton M.D.  Authorized by: Lg Fulton M.D.     Location:  OR  Urgency:  Elective  Indications for Airway Management:  Anesthesia      Spontaneous Ventilation: absent    Sedation Level:  Deep  Preoxygenated: Yes    Patient Position:  Sniffing  Mask Difficulty Assessment:  1 - vent by mask  Final Airway Type:  Endotracheal airway  Final Endotracheal Airway:  ETT  Cuffed: Yes    Technique Used for Successful ETT Placement:  Direct laryngoscopy  Devices/Methods Used in Placement:  Intubating stylet    Insertion Site:  Oral  Blade Type:  Gloria  Laryngoscope Blade/Videolaryngoscope Blade Size:  3  ETT Size (mm):  7.0  Measured from:  Teeth  ETT to Teeth (cm):  21  Placement Verified by: capnometry    Cormack-Lehane Classification:  Grade IIb - view of arytenoids or posterior of glottis only  Number of Attempts at Approach:  1

## 2022-04-13 NOTE — OR NURSING
1615: Pt arrived from IR, handoff received from anesthesiologist and RN. Patient A&Ox4, moving all extremities-denies numbness or tingling. States 2/10 pain to access site, declines intervention. Blood sugar test per anesthesia with result of 88. Left radial art line in place-correlating with cuff pressure. Right groin acces site with perclose, gauze and tegaderm dressing, C/D/I. Four hours flat/strict bed rest until 2015.     1645: Patient medicated for nausea and pain per MAR. Patient's  updated on status.     1700: Report called to stephanie RN, Janett     1715 Right groin access site remains soft, no hematoma or bleeding noted. Dressing C/D/I.

## 2022-04-13 NOTE — ANESTHESIA PROCEDURE NOTES
Arterial Line  Performed by: Lg Fulton M.D.  Authorized by: Lg Fulton M.D.     Start Time:  4/13/2022 12:06 PM  End Time:  4/13/2022 12:08 PM  Localization: ultrasound guidance  Image captured, interpreted and electronically stored.  Patient Location:  OR  Indication: continuous blood pressure monitoring        Catheter Size:  20 G  Seldinger Technique?: Yes    Laterality:  Left  Site:  Radial artery  Line Secured:  Antimicrobial disc, tape and transparent dressing  Events: patient tolerated procedure well with no complications

## 2022-04-13 NOTE — ANESTHESIA PREPROCEDURE EVALUATION
Date/Time: 04/13/22 1200    Scheduled providers: Jaime Jensen M.D.; Lg Fulton M.D.    Procedure: IR-EMBOLIZE-NEURO-INTRACRANIAL    Diagnosis:       Cerebral aneurysm, nonruptured [I67.1]      Cerebral aneurysm, nonruptured [I67.1]    Indications:       Cerebral aneurysm, nonruptured      Flow diverter Stent placement, left internal carotid artery. Aamir, to be performed by Dr. Jensen, 2.5 GA.    Location: [facility]      33 yo F w/ hx of left ICA aneurysm. NPO. Took her aspirin, plavix and OCP today. Pregnancy test neg. METS >4.    Relevant Problems   No relevant active problems       Physical Exam    Airway   Mallampati: II  TM distance: >3 FB  Neck ROM: full       Cardiovascular - normal exam  Rhythm: regular  Rate: normal  (-) murmur     Dental - normal exam           Pulmonary - normal exam  Breath sounds clear to auscultation     Abdominal    Neurological - normal exam                 Anesthesia Plan    ASA 2       Plan - general       Airway plan will be ETT          Induction: intravenous    Postoperative Plan: Postoperative administration of opioids is intended.    Pertinent diagnostic labs and testing reviewed    Informed Consent:    Anesthetic plan and risks discussed with patient.    Use of blood products discussed with: patient whom consented to blood products.

## 2022-04-13 NOTE — ANESTHESIA TIME REPORT
Anesthesia Start and Stop Event Times     Date Time Event    4/13/2022 1123 Ready for Procedure     1148 Anesthesia Start     1616 Anesthesia Stop        Responsible Staff  04/13/22    Name Role Begin End    Lg Fulton M.D. Anesth 1148 1616        Overtime Reason:  overtime    Comments:

## 2022-04-14 VITALS
HEART RATE: 82 BPM | SYSTOLIC BLOOD PRESSURE: 115 MMHG | DIASTOLIC BLOOD PRESSURE: 60 MMHG | TEMPERATURE: 96.9 F | WEIGHT: 214.07 LBS | BODY MASS INDEX: 33.6 KG/M2 | HEIGHT: 67 IN | RESPIRATION RATE: 17 BRPM | OXYGEN SATURATION: 94 %

## 2022-04-14 PROBLEM — R11.2 NON-INTRACTABLE VOMITING WITH NAUSEA: Status: ACTIVE | Noted: 2022-04-14

## 2022-04-14 LAB
ANION GAP SERPL CALC-SCNC: 11 MMOL/L (ref 7–16)
BUN SERPL-MCNC: 10 MG/DL (ref 8–22)
CALCIUM SERPL-MCNC: 7.8 MG/DL (ref 8.5–10.5)
CHLORIDE SERPL-SCNC: 107 MMOL/L (ref 96–112)
CO2 SERPL-SCNC: 21 MMOL/L (ref 20–33)
CREAT SERPL-MCNC: 0.65 MG/DL (ref 0.5–1.4)
ERYTHROCYTE [DISTWIDTH] IN BLOOD BY AUTOMATED COUNT: 43.5 FL (ref 35.9–50)
GFR SERPLBLD CREATININE-BSD FMLA CKD-EPI: 120 ML/MIN/1.73 M 2
GLUCOSE SERPL-MCNC: 119 MG/DL (ref 65–99)
HCT VFR BLD AUTO: 36.8 % (ref 37–47)
HGB BLD-MCNC: 12.4 G/DL (ref 12–16)
MAGNESIUM SERPL-MCNC: 1.7 MG/DL (ref 1.5–2.5)
MCH RBC QN AUTO: 31.6 PG (ref 27–33)
MCHC RBC AUTO-ENTMCNC: 33.7 G/DL (ref 33.6–35)
MCV RBC AUTO: 93.6 FL (ref 81.4–97.8)
PLATELET # BLD AUTO: 250 K/UL (ref 164–446)
PMV BLD AUTO: 10 FL (ref 9–12.9)
POTASSIUM SERPL-SCNC: 3.5 MMOL/L (ref 3.6–5.5)
RBC # BLD AUTO: 3.93 M/UL (ref 4.2–5.4)
SODIUM SERPL-SCNC: 139 MMOL/L (ref 135–145)
WBC # BLD AUTO: 8.6 K/UL (ref 4.8–10.8)

## 2022-04-14 PROCEDURE — 80048 BASIC METABOLIC PNL TOTAL CA: CPT

## 2022-04-14 PROCEDURE — G0378 HOSPITAL OBSERVATION PER HR: HCPCS

## 2022-04-14 PROCEDURE — 83735 ASSAY OF MAGNESIUM: CPT

## 2022-04-14 PROCEDURE — 700102 HCHG RX REV CODE 250 W/ 637 OVERRIDE(OP): Performed by: NURSE PRACTITIONER

## 2022-04-14 PROCEDURE — A9270 NON-COVERED ITEM OR SERVICE: HCPCS | Performed by: NEUROLOGICAL SURGERY

## 2022-04-14 PROCEDURE — A9270 NON-COVERED ITEM OR SERVICE: HCPCS | Performed by: NURSE PRACTITIONER

## 2022-04-14 PROCEDURE — 99291 CRITICAL CARE FIRST HOUR: CPT | Performed by: NURSE PRACTITIONER

## 2022-04-14 PROCEDURE — 85027 COMPLETE CBC AUTOMATED: CPT

## 2022-04-14 PROCEDURE — 700102 HCHG RX REV CODE 250 W/ 637 OVERRIDE(OP): Performed by: NEUROLOGICAL SURGERY

## 2022-04-14 RX ORDER — ONDANSETRON 4 MG/1
4 TABLET, ORALLY DISINTEGRATING ORAL EVERY 6 HOURS PRN
Status: DISCONTINUED | OUTPATIENT
Start: 2022-04-14 | End: 2022-04-14 | Stop reason: HOSPADM

## 2022-04-14 RX ORDER — POTASSIUM CHLORIDE 20 MEQ/1
60 TABLET, EXTENDED RELEASE ORAL ONCE
Status: COMPLETED | OUTPATIENT
Start: 2022-04-14 | End: 2022-04-14

## 2022-04-14 RX ORDER — ONDANSETRON 4 MG/1
4 TABLET, ORALLY DISINTEGRATING ORAL EVERY 6 HOURS PRN
Qty: 10 TABLET | Refills: 0 | Status: SHIPPED
Start: 2022-04-14 | End: 2023-03-27

## 2022-04-14 RX ADMIN — CLOPIDOGREL BISULFATE 75 MG: 75 TABLET ORAL at 05:19

## 2022-04-14 RX ADMIN — DOCUSATE SODIUM 100 MG: 100 CAPSULE, LIQUID FILLED ORAL at 05:05

## 2022-04-14 RX ADMIN — ACETAMINOPHEN 1000 MG: 500 TABLET ORAL at 05:05

## 2022-04-14 RX ADMIN — ASPIRIN 81 MG 81 MG: 81 TABLET ORAL at 05:19

## 2022-04-14 RX ADMIN — POTASSIUM CHLORIDE 60 MEQ: 20 TABLET, EXTENDED RELEASE ORAL at 10:09

## 2022-04-14 ASSESSMENT — ENCOUNTER SYMPTOMS
FOCAL WEAKNESS: 0
COUGH: 0
VOMITING: 1
SHORTNESS OF BREATH: 0
NAUSEA: 1
DIZZINESS: 0
CHILLS: 0
ABDOMINAL PAIN: 0
SPUTUM PRODUCTION: 0
HEADACHES: 0
SPEECH CHANGE: 0
BLURRED VISION: 0
FEVER: 0
TINGLING: 0
SENSORY CHANGE: 0
TREMORS: 0
HEMOPTYSIS: 0
MYALGIAS: 0
SEIZURES: 0
HEARTBURN: 0
WEAKNESS: 0

## 2022-04-14 ASSESSMENT — PAIN DESCRIPTION - PAIN TYPE: TYPE: ACUTE PAIN

## 2022-04-14 NOTE — OP REPORT
NEUROSURGERY OPERATIVE NOTE  DATE:  9:08 PM 2022    PATIENT NAME:  Dusty Lambert   1990 MRN [id number]      PROCEDURE:  1.  Left common carotid artery angiography  2.  Left internal carotid artery angiography  3.  Surpass Evolve flow diverter stent placement   4.  Ultrasound guided right common femoral arteriotomy  5.  6 Ukrainian Perclose device placement right common femoral artery  6.  Modifier 22: Significant increased technical difficulty and significant increase and time of procedure secondary to excessive internal carotid artery tortuosity making stent deployment extremely difficult.    Surgeon:  Jaime Jensen MD, PhD  Assistant:  none    Anesthesia:  GETA    Diagnosis:  Unruptured left paraophthalmic ICA aneurysm    Contrast: Visipaque 300 100 cc    Indication:  32 year old female with 4 mm left paraophthalic ICA aneurysm noted during evaluation of migraines.  GIven her young age, endovascular treatment of the aneurysm with a flow diverter stent is planned.    Procedure:  The patient was identified in the holding area, and the surgery site marked, consent was obtained.  The patient was brought back to the Angiography Suite  and intubated by anesthesia service.  Ancef was administered intravenously.  They were transferred to the angiography table in a prone manner and all pressure points well padded.  Their bilateral groin regions were prepped with hair clipping, chlorhexidine and betadine scrub, and Chloroprep.  Sterile drapes were applied.    A 8 fr sheath was placed in the right common femoral artery using standard Seldinger technique and ultrasound guidance. 5000 units of heparin was administered intravenously by Anesthesia service; ACT was kept at 250 with additional heparin boluses.   A 6 Fr Infinity sheath was advanced over a 6 Fr Berenstein catheter and positioned in the left common carotid artery.  Standard oblique and lateral views of the cervical carotid arteries were obtained.   A XT27 microcatheter was passed through a Catalyst 5 catheter, and the XT27 catheter  positioned in the left M2 segment of the MCA; the Catalyst 5 catheter was positioned just proximal to the aneurysm.  A 4.5 x 20 mm Surpass Evolve stent was passed thorough the XT27 catheter.  The distal end of the stent was deployed and resheathed to flip the distal petals.  The stent was positioned in the ICA just proximal to the anterior choroidal artery, and the stent slowly deployed across the aneurysm neck into the genu of the cavernous ICA.  The stent continued to ribbon on the outside of the ICA genu.  Multiple loading/unloading manuevers were attempted but the ribbon persisted.  The stent was eventually resheathed, but the proximal platform had detached.  The Catalyst 5 cattheter was advanced over the stent, and the XT27, stent were removed.  A 4.0x12 mm Surpass Evolve stent was then passed thorugh the XT 27 catheter which has been repositioned in the left M2 segment of the MCA.  The distal end of the stent was deployed and resheathed, then repositioned just distal to the aneurysm neck.  The stent was deployed ; the proximal aspect of the stent was just at the proximal aspect of the aneurysm neck.  A second 4.5x20 mm stent was attempted to be positioned overlapping the previously placed stent; once again the stent ribboned in the anterior genu.  The stent was resheathed and removed.  Standard AP and lateral views of the intracranial circulation were obtained.  This demonstrated some flow diverter affect at the aneurysm with stagnation within the aneurysm occurring extending into the late venous phase.    FINDINGS:   The left common carotid artery injection demonstrates anterograde opacification of the cervical carotid arteries.  No stenosis occurs.    The left internal carotid artery injection demonstrates anterograde opacification the anterior middle cerebral arteries.  No opacification of the posterior anterior  communicating arteries occurs.  The capillary and venous phases are unremarkable.  The cavernous and paraclinoid internal carotid arteries were quite tortuous with a very strong hairpin type turn at the ophthalmic artery.  Approximate 4 mm tall by 4 mm wide aneurysm projects superomedially from the paraophthalmic internal carotid artery, originating just distal to the ophthalmic artery.  Flow diverter stent placement (Surpass evolve, Aamir) was placed just spanning the aneurysm with flow diverter effect noted on follow-up angiography.    EBL:  30 CC  COMPLICATIONS:  None apparent at end of procedure.

## 2022-04-14 NOTE — ANESTHESIA POSTPROCEDURE EVALUATION
Patient: Dusty Birmingham Melanaphy    Procedure Summary     Date: 04/13/22 Room / Location: [facility]    Anesthesia Start: 1148 Anesthesia Stop: 1616    Procedure: IR-EMBOLIZE-NEURO-INTRACRANIAL Diagnosis:       Cerebral aneurysm, nonruptured      Cerebral aneurysm, nonruptured      (Cerebral aneurysm, nonruptured)      (Flow diverter Stent placement, left internal carotid artery. Aamir, to be performed by Dr. Jensen, 2.5 GA.)    Scheduled Providers: Jaime Jensen M.D.; Lg Fulton M.D. Responsible Provider: Lg Fulton M.D.    Anesthesia Type: general ASA Status: 2          Final Anesthesia Type: general  Last vitals  BP   Blood Pressure: 115/67, Arterial BP: 128/73    Temp   36.1 °C (96.9 °F)    Pulse   90   Resp   (!) 22    SpO2   94 %      Anesthesia Post Evaluation    Patient location during evaluation: PACU  Patient participation: complete - patient participated  Level of consciousness: awake and alert    Airway patency: patent  Anesthetic complications: no  Cardiovascular status: hemodynamically stable  Respiratory status: acceptable  Hydration status: euvolemic    PONV: none          No complications documented.     Nurse Pain Score: 6 (NPRS)

## 2022-04-14 NOTE — CONSULTS
Critical Care Consultation    Date of consult: 4/13/2022    Referring Physician  Jaime Jensen M.D.    Reason for Consultation  Post operative cerebral angiogram and flow diverter into left internal carotid aneursym.     History of Presenting Illness  32 y.o. female who presented 4/13/2022 with Hx of meningitis, migraines two miscarriages and continued to have frequent headache so a MRI was preformed which showed and 4mm supraclinoid left internal carotid aneurysm. She was referred to Dr Jensen and today is post operative from flow diverter placement with cerebral angiogram. She is currently without complaints other then nausea and some low abdominal pain. She also has a hx of T&A, ankle fracture and hardware. She has a sulfur allergy works at the [employer] and has a home photography hobby, rare occasional alcohol use no drug use. No family hx of aneurysm.     Code Status  Full Code    Review of Systems  Review of Systems   Constitutional: Negative for fever, malaise/fatigue and weight loss.   HENT: Negative for hearing loss and sinus pain.    Eyes: Negative for blurred vision, double vision, pain and discharge.   Respiratory: Negative for cough, sputum production and shortness of breath.    Cardiovascular: Negative for chest pain, claudication and leg swelling.   Gastrointestinal: Positive for abdominal pain and nausea. Negative for blood in stool, constipation, diarrhea and vomiting.   Genitourinary: Negative for dysuria, frequency and hematuria.   Musculoskeletal: Negative for back pain, joint pain and myalgias.   Neurological: Negative for tingling, tremors, sensory change, speech change, focal weakness, seizures, weakness and headaches.   Psychiatric/Behavioral: Negative for depression, hallucinations, substance abuse and suicidal ideas. The patient is not nervous/anxious and does not have insomnia.        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    She has no past medical history of Clotting disorder (HCC), Heart attack (HCC), or Liver disease.    Surgical History   has a past surgical history that includes tonsillectomy and adenoidectomy (1997); nasal septal reconstruction (2005); nasal septal reconst (2006); turbinoplasty (12/19/2008); nasal septal reconstruction (12/19/2008); orif, ankle (3/2/2012); and other orthopedic surgery.    Family History  family history includes Cancer (age of onset: 52) in her mother; Stroke in her father.    Social History   reports that she quit smoking about 10 years ago. She has never used smokeless tobacco. She reports previous alcohol use of about 1.2 oz of alcohol per week. She reports that she does not use drugs.    Medications  Home Medications     Reviewed by Anthony Grande (Pharmacy Tech) on 04/13/22 at 0944  Med List Status: Complete   Medication Last Dose Status   acetaminophen (TYLENOL) 325 MG Tab 4/3/2022 Active   amitriptyline (ELAVIL) 10 MG Tab 4/12/2022 Active   aspirin (ASA) 81 MG Chew Tab chewable tablet 4/13/2022 Active   clopidogrel (PLAVIX) 75 MG Tab 4/13/2022 Active   KURVELO 0.15-30 MG-MCG per tablet 4/13/2022 Active              Current Facility-Administered Medications   Medication Dose Route Frequency Provider Last Rate Last Admin   • PHENYLEPHRINE HCL 10 MG/ML INJ SOLN (WRAPPED)            • amitriptyline (ELAVIL) tablet 10 mg  10 mg Oral Nightly Lindsey Shahid, A.P.R.N.       • [START ON 4/14/2022] aspirin (ASA) chewable tab 81 mg  81 mg Oral DAILY Lindsey Shahid, A.P.R.N.       • [START ON 4/14/2022] clopidogrel (PLAVIX) tablet 75 mg  75 mg Oral DAILY Lindsey Shahid, A.P.R.N.       • levonorgestrel-ethinyl estradiol (NORDETTE) 0.15-30 MG-MCG TABS 1 Tablet  1 Tablet Oral DAILY Lindsey Shahid, A.P.R.N.       • Pharmacy Consult Request ...Pain Management Review 1 Each  1 Each Other  PHARMACY TO DOSE Lindsey Shahid, A.P.R.N.       • ondansetron (ZOFRAN) syringe/vial injection 4 mg  4 mg Intravenous Q4HRS PRN Lindsey Shahid, A.P.R.N.       • diphenhydrAMINE (BENADRYL) injection 25 mg  25 mg Intravenous Q6HRS PRN Lindsey Shahid, A.P.R.N.       • scopolamine (TRANSDERM-SCOP) patch 1 Patch  1 Patch Transdermal Q72HRS PRN Lindsey Shahid, A.P.R.N.       • labetalol (NORMODYNE/TRANDATE) injection 10 mg  10 mg Intravenous Q HOUR PRN Lindsey Shahid, A.P.R.N.       • hydrALAZINE (APRESOLINE) injection 10 mg  10 mg Intravenous Q HOUR PRN Lindsey Shahid, A.P.R.N.       • cloNIDine (CATAPRES) tablet 0.1 mg  0.1 mg Oral Q4HRS PRN Lindsey Shahid, A.P.R.N.       • niCARdipine (CARDENE) 25 mg in  mL Infusion  0-15 mg/hr Intravenous Continuous Lindsey Shahid, A.P.R.N.   Held at 04/13/22 1700   • docusate sodium (COLACE) capsule 100 mg  100 mg Oral BID Lindsey Shahid, A.P.R.N.       • senna-docusate (PERICOLACE or SENOKOT S) 8.6-50 MG per tablet 1 Tablet  1 Tablet Oral Nightly Lindsey Shahid, A.P.R.N.       • senna-docusate (PERICOLACE or SENOKOT S) 8.6-50 MG per tablet 1 Tablet  1 Tablet Oral Q24HRS PRN Lindsey Shahid, A.P.R.N.       • polyethylene glycol/lytes (MIRALAX) PACKET 1 Packet  1 Packet Oral BID PRN Lindsey Shahid, A.P.R.N.       • magnesium hydroxide (MILK OF MAGNESIA) suspension 30 mL  30 mL Oral QDAY PRN Lindsey Shahid, A.P.R.N.       • bisacodyl (DULCOLAX) suppository 10 mg  10 mg Rectal Q24HRS PRN Lindsey Shahid, A.P.R.N.       • sodium phosphate (Fleet) enema 133 mL  1 Each Rectal Once PRN Lindsey Shahid, A.P.R.N.       • artificial tears (EYE LUBRICANT) ophth ointment 1 Application  1 Application Both Eyes Q8HRS Lindsey Shahid, A.P.R.N.       • acetaminophen (TYLENOL) tablet 1,000 mg  1,000 mg Oral Q6HRS Lindsey Shahid, A.P.R.N.        Followed by   • [START ON 4/18/2022] acetaminophen (TYLENOL) tablet 1,000 mg  1,000 mg Oral Q6HRS PRN Lindsey Shahid, A.P.R.N.       • oxyCODONE  immediate-release (ROXICODONE) tablet 5 mg  5 mg Oral Q3HRS PRN Lindsey Shahid, A.P.R.N.        Or   • oxyCODONE immediate-release (ROXICODONE) tablet 10 mg  10 mg Oral Q3HRS PRN Lindsey Shahid, A.P.R.N.        Or   • morphine 4 MG/ML injection 4 mg  4 mg Intravenous Q3HRS PRN Lindsey Shahid, A.P.R.N.       • benzocaine-menthol (Cepacol) lozenge 1 Lozenge  1 Lozenge Mouth/Throat Q2HRS PRN Lindsey Shahid, A.P.R.N.       • metoclopramide (REGLAN) injection 5 mg  5 mg Intravenous Q6HRS PRN Brian Roth M.D.   5 mg at 04/13/22 1802       Allergies  Allergies   Allergen Reactions   • Sulfa Drugs Hives   • Other Food Hives     Cranberries and radishes   • Other Food Hives     Radishes (Raphanus Sativus)   • Vancomycin Rash     Pt currently red and blotchy on anterior and posterior thoracic area from previous dose of Vanco. Tolerated vancomycin 6/29/18 without issue (suspect infusion related reaction).       Vital Signs last 24 hours  Temp:  [35.7 °C (96.3 °F)-36.1 °C (96.9 °F)] 36.1 °C (96.9 °F)  Pulse:  [66-94] 78  Resp:  [12-21] 20  BP: (112-125)/(57-77) 115/74  SpO2:  [95 %-100 %] 96 %    Physical Exam  Physical Exam  Vitals and nursing note reviewed.   HENT:      Head: Normocephalic.      Mouth/Throat:      Mouth: Mucous membranes are moist.   Eyes:      Extraocular Movements: Extraocular movements intact.      Pupils: Pupils are equal, round, and reactive to light.   Cardiovascular:      Rate and Rhythm: Normal rate.      Heart sounds: No murmur heard.  Pulmonary:      Effort: No respiratory distress.      Breath sounds: No stridor. No wheezing or rhonchi.   Abdominal:      General: There is no distension.      Palpations: There is no mass.      Tenderness: There is no abdominal tenderness. There is no guarding or rebound.      Hernia: No hernia is present.      Comments: Mild suprapubic tenderness, groin site no bruising or tenderness   Musculoskeletal:         General: No swelling, tenderness, deformity or  signs of injury.      Cervical back: No rigidity.   Skin:     Coloration: Skin is not jaundiced or pale.   Neurological:      Mental Status: She is alert and oriented to person, place, and time.      Cranial Nerves: No cranial nerve deficit.      Sensory: No sensory deficit.      Motor: No weakness.      Coordination: Coordination normal.   Psychiatric:         Mood and Affect: Mood normal.         Fluids    Intake/Output Summary (Last 24 hours) at 4/13/2022 1813  Last data filed at 4/13/2022 1615  Gross per 24 hour   Intake 1800 ml   Output 460 ml   Net 1340 ml       Laboratory  Recent Results (from the past 48 hour(s))   ABO Rh Confirm    Collection Time: 04/13/22 10:50 AM   Result Value Ref Range    ABO Rh Confirm A POS    HCG QUAL SERUM    Collection Time: 04/13/22 10:50 AM   Result Value Ref Range    Beta-Hcg Qualitative Serum Negative Negative   POCT activated clotting time device results    Collection Time: 04/13/22 12:47 PM   Result Value Ref Range    Istat Activated Clotting Time 208 (H) 74 - 137 sec   POCT activated clotting time device results    Collection Time: 04/13/22  1:03 PM   Result Value Ref Range    Istat Activated Clotting Time 214 (H) 74 - 137 sec   POCT activated clotting time device results    Collection Time: 04/13/22  1:21 PM   Result Value Ref Range    Istat Activated Clotting Time 220 (H) 74 - 137 sec   POCT activated clotting time device results    Collection Time: 04/13/22  2:03 PM   Result Value Ref Range    Istat Activated Clotting Time 214 (H) 74 - 137 sec   POCT activated clotting time device results    Collection Time: 04/13/22  2:29 PM   Result Value Ref Range    Istat Activated Clotting Time 208 (H) 74 - 137 sec   POCT activated clotting time device results    Collection Time: 04/13/22  2:50 PM   Result Value Ref Range    Istat Activated Clotting Time 226 (H) 74 - 137 sec   POCT activated clotting time device results    Collection Time: 04/13/22  3:16 PM   Result Value Ref Range     Istat Activated Clotting Time 220 (H) 74 - 137 sec   POCT activated clotting time device results    Collection Time: 04/13/22  3:42 PM   Result Value Ref Range    Istat Activated Clotting Time 231 (H) 74 - 137 sec   POCT glucose device results    Collection Time: 04/13/22  4:24 PM   Result Value Ref Range    POC Glucose, Blood 88 65 - 99 mg/dL       Imaging  IR-EMBOLIZE-NEURO-INTRACRANIAL    (Results Pending)       Assessment/Plan  * Aneurysm of internal carotid artery- (present on admission)  Assessment & Plan  Post operative cerebral angiogram   Strict blood pressure contro < 160  Serial neurologic exams  Monitor vascular access site  Aspirin and plavix        Discussed patient condition and risk of morbidity and/or mortality with RN, Pharmacy, Charge nurse / hot rounds and Patient.      Brian Roth MD  Critical Care Medicine

## 2022-04-14 NOTE — ASSESSMENT & PLAN NOTE
POD #1 for left common carotid artery angiography, Left internal carotid artery angiography, flow diverter stent placement with Dr Jensen for left paraopthalict ICA aneurysm  Strict blood pressure contro < 160- with art line monitoring and management.  Plan to DC Art line  Serial neurologic exams  Monitor vascular access site  Aspirin and plavix

## 2022-04-14 NOTE — DISCHARGE SUMMARY
Discharge Summary    CHIEF COMPLAINT ON ADMISSION  No chief complaint on file.      Reason for Admission  Cerebral aneurysm, nonruptured     Admission Date  4/13/2022    CODE STATUS  Full Code    HPI & HOSPITAL COURSE  This is a 32 y.o. female admitted the date of surgery. Surgery went as planned without complication. POD#1 she is ready to discharge home.       Therefore, she is discharged in good and stable condition to home with close outpatient follow-up.    The patient recovered much more quickly than anticipated on admission.    Discharge Date  4/14/2022    FOLLOW UP ITEMS POST DISCHARGE  [facility] as scheduled    DISCHARGE DIAGNOSES  Principal Problem:    Aneurysm of internal carotid artery POA: Yes  Active Problems:    Aneurysm (HCC) POA: Yes  Resolved Problems:    * No resolved hospital problems. *      FOLLOW UP  No future appointments.  No follow-up provider specified.    MEDICATIONS ON DISCHARGE     Medication List      CONTINUE taking these medications      Instructions   acetaminophen 325 MG Tabs  Commonly known as: Tylenol   Take 650 mg by mouth 2 times a day as needed for Moderate Pain.  Dose: 650 mg     amitriptyline 10 MG Tabs  Commonly known as: ELAVIL   Take 10 mg by mouth every evening.  Dose: 10 mg     aspirin 81 MG Chew chewable tablet  Commonly known as: ASA   Chew 81 mg every day.  Dose: 81 mg     clopidogrel 75 MG Tabs  Commonly known as: PLAVIX   Take 75 mg by mouth every day.  Dose: 75 mg     Kurvelo 0.15-30 MG-MCG per tablet  Generic drug: levonorgestrel-ethinyl estradiol   Take 1 Tablet by mouth every day.  Dose: 1 Tablet            Allergies  Allergies   Allergen Reactions   • Sulfa Drugs Hives   • Other Food Hives     Cranberries and radishes   • Other Food Hives     Radishes (Raphanus Sativus)   • Vancomycin Rash     Pt currently red and blotchy on anterior and posterior thoracic area from previous dose of Vanco. Tolerated vancomycin 6/29/18 without issue (suspect infusion  related reaction).       DIET  Orders Placed This Encounter   Procedures   • Diet Order Diet: Regular     Standing Status:   Standing     Number of Occurrences:   1     Order Specific Question:   Diet:     Answer:   Regular [1]       ACTIVITY  As tolerated.  No high impact activity x 2 weeks  No lifting more than 10-15 pounds x 2 weeks    CONSULTATIONS  non    PROCEDURES  1.  Left common carotid artery angiography  2.  Left internal carotid artery angiography  3.  Surpass Evolve flow diverter stent placement   4.  Ultrasound guided right common femoral arteriotomy  5.  6 Ivorian Perclose device placement right common femoral artery  6.  Modifier 22: Significant increased technical difficulty and significant increase and time of procedure secondary to excessive internal carotid artery tortuosity making stent deployment extremely difficult.    LABORATORY  Lab Results   Component Value Date    SODIUM 139 04/14/2022    POTASSIUM 3.5 (L) 04/14/2022    CHLORIDE 107 04/14/2022    CO2 21 04/14/2022    GLUCOSE 119 (H) 04/14/2022    BUN 10 04/14/2022    CREATININE 0.65 04/14/2022        Lab Results   Component Value Date    WBC 8.6 04/14/2022    WBC 11.3 (H) 02/01/2010    HEMOGLOBIN 12.4 04/14/2022    HEMATOCRIT 36.8 (L) 04/14/2022    PLATELETCT 250 04/14/2022        Total time of the discharge process exceeds 25 minutes.

## 2022-04-14 NOTE — PROGRESS NOTES
1730: Pt arrived to R-117 from PACU. Right groin access site clean, dry, intact, and soft. Pt to remain flat/strict BR until 2015 per order. Pt complains of nausea at this time, contacted Dr. Roth and Link ordered.     1745: Bedside swallow eval performed; pt passed with no issues and diet ordered.

## 2022-04-14 NOTE — PROGRESS NOTES
Neurosurgery Progress Note    Subjective:  No acute events    Exam:  A&O x3. Fluent Speech.   PERRL, EOMI. Denies blurry or double vision.   Tongue midline without fasciculation. No facial droop.   Hearing intact.   Strength: Bilateral shoulder shrug, bicep, tricep, deltoid,  5/5. No Rosenberg's                  Bilateral IP, DF, PF 5/5. No Clonus.   Sensation: Intact throughout.   Incision: dressing c/d/i   Eating, drinking. Denies n/v.  Pain controlled.   Ambulatory.     BP  Min: 103/58  Max: 125/62  Pulse  Av.6  Min: 56  Max: 94  Resp  Av.4  Min: 12  Max: 39  Temp  Av.1 °C (96.9 °F)  Min: 36 °C (96.8 °F)  Max: 36.1 °C (96.9 °F)  Monitored Temp 2  Av.9 °C (98.4 °F)  Min: 36.3 °C (97.3 °F)  Max: 37.2 °C (98.96 °F)  SpO2  Av.3 %  Min: 93 %  Max: 100 %    No data recorded    Recent Labs     22  0511   WBC 8.6   RBC 3.93*   HEMOGLOBIN 12.4   HEMATOCRIT 36.8*   MCV 93.6   MCH 31.6   MCHC 33.7   RDW 43.5   PLATELETCT 250   MPV 10.0     Recent Labs     22  0511   SODIUM 139   POTASSIUM 3.5*   CHLORIDE 107   CO2 21   GLUCOSE 119*   BUN 10   CREATININE 0.65   CALCIUM 7.8*               Intake/Output                       22 0700 - 22 0659 22 07 - 04/15/22 0659      Total  8901-7085 Total                 Intake    P.O.  --  -- --  250  -- 250    P.O. -- -- -- 250 -- 250    I.V.  1800  -- 1800  --  -- --    Volume (mL) (lactated ringers infusion) 1800 -- 1800 -- -- --    Total Intake 1800 -- 1800 250 -- 250       Output    Urine  525  550 1075  --  -- --    Urine 450 -- 450 -- -- --    Output (mL) (Urethral Catheter Temperature probe) 75 550 625 -- -- --    Blood  10  -- 10  --  -- --    Est. Blood Loss 10 -- 10 -- -- --    Total Output  -- -- --       Net I/O     1265 -550 715 250 -- 250            Intake/Output Summary (Last 24 hours) at 2022 1013  Last data filed at 2022 0800  Gross per 24 hour   Intake 2050 ml    Output 1085 ml   Net 965 ml            • amitriptyline  10 mg Nightly   • aspirin  81 mg DAILY   • clopidogrel  75 mg DAILY   • Pharmacy Consult Request  1 Each PHARMACY TO DOSE   • ondansetron  4 mg Q4HRS PRN   • diphenhydrAMINE  25 mg Q6HRS PRN   • scopolamine  1 Patch Q72HRS PRN   • labetalol  10 mg Q HOUR PRN   • hydrALAZINE  10 mg Q HOUR PRN   • cloNIDine  0.1 mg Q4HRS PRN   • niCARdipine infusion  0-15 mg/hr Continuous   • docusate sodium  100 mg BID   • senna-docusate  1 Tablet Nightly   • senna-docusate  1 Tablet Q24HRS PRN   • polyethylene glycol/lytes  1 Packet BID PRN   • magnesium hydroxide  30 mL QDAY PRN   • bisacodyl  10 mg Q24HRS PRN   • sodium phosphate  1 Each Once PRN   • artificial tears  1 Application Q8HRS   • acetaminophen  1,000 mg Q6HRS    Followed by   • [START ON 4/18/2022] acetaminophen  1,000 mg Q6HRS PRN   • oxyCODONE immediate-release  5 mg Q3HRS PRN    Or   • oxyCODONE immediate-release  10 mg Q3HRS PRN    Or   • morphine injection  4 mg Q3HRS PRN   • benzocaine-menthol  1 Lozenge Q2HRS PRN   • metoclopramide  5 mg Q6HRS PRN       Assessment and Plan:  Hospital day #1  POD #1    Plan:  Patient is ready to dc home today  DC instructions discussed with pt at bedside by Dr Jensen

## 2022-04-14 NOTE — DISCHARGE INSTRUCTIONS
Discharge Instructions    Discharged to home by car with . Discharged via wheelchair, hospital escort: .  Special equipment needed: NA    Be sure to schedule a follow-up appointment with your primary care doctor or any specialists as instructed.     Discharge Plan:   Diet Plan: Discussed  Activity Level: Discussed  Confirmed Follow up Appointment: Appointment Scheduled  Confirmed Symptoms Management: Discussed  Medication Reconciliation Updated: Yes    I understand that a diet low in cholesterol, fat, and sodium is recommended for good health. Unless I have been given specific instructions below for another diet, I accept this instruction as my diet prescription.   Other diet: regular  Special Instructions: monitor for bleed risk    · Is patient discharged on Warfarin / Coumadin? NO    Depression / Suicide Risk    As you are discharged from this [facility] facility, it is important to learn how to keep safe from harming yourself.    Recognize the warning signs:  · Abrupt changes in personality, positive or negative- including increase in energy   · Giving away possessions  · Change in eating patterns- significant weight changes-  positive or negative  · Change in sleeping patterns- unable to sleep or sleeping all the time   · Unwillingness or inability to communicate  · Depression  · Unusual sadness, discouragement and loneliness  · Talk of wanting to die  · Neglect of personal appearance   · Rebelliousness- reckless behavior  · Withdrawal from people/activities they love  · Confusion- inability to concentrate     If you or a loved one observes any of these behaviors or has concerns about self-harm, here's what you can do:  · Talk about it- your feelings and reasons for harming yourself  · Remove any means that you might use to hurt yourself (examples: pills, rope, extension cords, firearm)  · Get professional help from the community (Mental Health, Substance Abuse, psychological counseling)  · Do  not be alone:Call your Safe Contact- someone whom you trust who will be there for you.  · Call your local CRISIS HOTLINE 327-1199 or 431-039-1852  · Call your local Children's Mobile Crisis Response Team [place] (080) 654-8468 or www.[employer]  · Call the toll free National Suicide Prevention Hotlines   · National Suicide Prevention Lifeline 269-749-JLBH (1792)  · National [employer] Line Network 800-SUICIDE (882-5033)

## 2022-04-14 NOTE — CARE PLAN
The patient is experiencing decrease nausea this morning and no head ache at this time,     Shift Goals  Clinical Goals: Neuro stability and pain management  Patient Goals: nausea management  Family Goals: TANGELA    Progress made toward(s) clinical / shift goals:  yes    Patient is not progressing towards the following goals:      Problem: Pain - Standard  Goal: Alleviation of pain or a reduction in pain to the patient’s comfort goal  Outcome: Progressing     Problem: Knowledge Deficit - Standard  Goal: Patient and family/care givers will demonstrate understanding of plan of care, disease process/condition, diagnostic tests and medications  Outcome: Progressing

## 2022-04-14 NOTE — PROGRESS NOTES
Critical Care Progress Note    Date of admission  4/13/2022    Chief Complaint  32 y.o. female admitted 4/13/2022 with history of migraines.    Hospital Course  32 yom female who presented 4/13/2022 with Hx of meningitis, migraines, miscarriages and  was found to have a 4mm supraclinoid left internal carotid aneurysm upon FU MRI for her frequent migraines.  She underwent  a flow diverter placement with cerebral angiogram by Dr Jensen 4/13/22. She was transferred to ICU post op for frequent hourly neruro checks as well as continuous BP monitoring with art line       Interval Problem Update  Reviewed last 24 hour events:    Daily exam:  POD #1 for left common carotid artery angiography, Left internal carotid artery angiography, flow diverter stent placement with Dr Jensen for left paraopthalict ICA aneurysm    Daily Multi discipline Rounding Report:  Neuro: No neruo def.   HR: 65-70s  SBP:Low 100s- 120s-  strict BP control of <160  Tmax: 98.6  GI: intermittent nausea  UOP: Jackson dcd-   Lines: Art line with BP within parameters may dc this am   Resp: RA  Vte: contraindicated at this time- waiting for okay by NS- DC with Plavix and ASA  PPI/H2:not indicated  Antibx: none  IO: +715  Other drips - no gtts   Plan of care:  DC jackson, DC art line. PO Zofran for intermittent nausea  With NS permission plan for pt to transfer to floor or home.      Review of Systems  Review of Systems   Constitutional: Negative for chills and fever.   HENT: Negative for hearing loss.    Eyes: Negative for blurred vision.   Respiratory: Negative for cough, hemoptysis, sputum production and shortness of breath.    Cardiovascular: Negative for chest pain.   Gastrointestinal: Positive for nausea and vomiting. Negative for abdominal pain and heartburn.   Genitourinary: Negative for dysuria.   Musculoskeletal: Negative for myalgias.   Skin: Negative for rash.   Neurological: Negative for dizziness, tingling, tremors, sensory change, speech change,  focal weakness, seizures, weakness and headaches.        Vital Signs for last 24 hours   Temp:  [36 °C (96.8 °F)-36.1 °C (96.9 °F)] 36.1 °C (96.9 °F)  Pulse:  [56-94] 82  Resp:  [12-39] 17  BP: (103-125)/(56-77) 115/60  SpO2:  [93 %-100 %] 94 %    Hemodynamic parameters for last 24 hours       Respiratory Information for the last 24 hours       Physical Exam   Physical Exam  Vitals and nursing note reviewed.   Constitutional:       Appearance: Normal appearance. She is well-groomed.   HENT:      Head: Normocephalic.      Mouth/Throat:      Mouth: Mucous membranes are moist.   Eyes:      Pupils: Pupils are equal, round, and reactive to light.   Cardiovascular:      Rate and Rhythm: Normal rate and regular rhythm.      Pulses:           Radial pulses are 2+ on the right side and 2+ on the left side.        Dorsalis pedis pulses are 2+ on the right side and 2+ on the left side.      Heart sounds: Normal heart sounds.   Pulmonary:      Breath sounds: Normal breath sounds and air entry.   Chest:   Breasts: Breasts are symmetrical.       Abdominal:      General: Bowel sounds are normal.      Palpations: Abdomen is soft.   Genitourinary:     Comments: Jimenez cath in place- clear yellow urine  R groin cath site WNL  Musculoskeletal:      Cervical back: Full passive range of motion without pain and normal range of motion.   Skin:     General: Skin is warm.      Capillary Refill: Capillary refill takes less than 2 seconds.   Neurological:      General: No focal deficit present.      Mental Status: She is alert and oriented to person, place, and time. Mental status is at baseline.      GCS: GCS eye subscore is 4. GCS verbal subscore is 5. GCS motor subscore is 6.      Cranial Nerves: Cranial nerves are intact.      Sensory: Sensation is intact.      Motor: Motor function is intact.      Coordination: Coordination is intact.      Gait: Gait is intact.      Comments: FRANCISCO 3mm  AAO X 4  MAEE and strong]  No Neruo defacits    Psychiatric:         Attention and Perception: Attention normal.         Mood and Affect: Mood normal.         Speech: Speech normal.         Behavior: Behavior normal. Behavior is cooperative.         Thought Content: Thought content normal.         Cognition and Memory: Cognition and memory normal.         Medications  Current Facility-Administered Medications   Medication Dose Route Frequency Provider Last Rate Last Admin   • ondansetron (ZOFRAN ODT) dispertab 4 mg  4 mg Oral Q6HRS PRN Martha Alexandra A.P.R.N.       • amitriptyline (ELAVIL) tablet 10 mg  10 mg Oral Nightly Lindsey Shahid, A.P.R.N.   10 mg at 04/13/22 2046   • aspirin (ASA) chewable tab 81 mg  81 mg Oral DAILY Lindsey Shahid, A.P.R.N.   81 mg at 04/14/22 0519   • clopidogrel (PLAVIX) tablet 75 mg  75 mg Oral DAILY Lindsey Shahid, A.P.R.N.   75 mg at 04/14/22 0519   • Pharmacy Consult Request ...Pain Management Review 1 Each  1 Each Other PHARMACY TO DOSE Lindsey Shahid, A.P.R.N.       • ondansetron (ZOFRAN) syringe/vial injection 4 mg  4 mg Intravenous Q4HRS PRN Lindsey Shahid, A.P.R.N.   4 mg at 04/13/22 1926   • diphenhydrAMINE (BENADRYL) injection 25 mg  25 mg Intravenous Q6HRS PRN Lindsey Shahid, A.P.R.N.   25 mg at 04/13/22 2034   • scopolamine (TRANSDERM-SCOP) patch 1 Patch  1 Patch Transdermal Q72HRS PRN Lindsey Shahid, A.P.R.N.       • labetalol (NORMODYNE/TRANDATE) injection 10 mg  10 mg Intravenous Q HOUR PRN Lindsey Shahid, A.P.R.N.       • hydrALAZINE (APRESOLINE) injection 10 mg  10 mg Intravenous Q HOUR PRN Lindsey Shahid, A.P.R.N.       • cloNIDine (CATAPRES) tablet 0.1 mg  0.1 mg Oral Q4HRS PRN Lindsey Shahid, A.P.R.N.       • niCARdipine (CARDENE) 25 mg in  mL Infusion  0-15 mg/hr Intravenous Continuous TATIANNA Bhatt.P.R.N.   Held at 04/13/22 1700   • docusate sodium (COLACE) capsule 100 mg  100 mg Oral BID TATIANNA Bhatt.P.R.N.   100 mg at 04/14/22 0505   • senna-docusate (PERICOLACE or SENOKOT S) 8.6-50 MG per tablet  1 Tablet  1 Tablet Oral Nightly Lindsey Shahid, A.P.R.N.   1 Tablet at 04/13/22 2046   • senna-docusate (PERICOLACE or SENOKOT S) 8.6-50 MG per tablet 1 Tablet  1 Tablet Oral Q24HRS PRN Lindsey Shahid, A.P.R.N.       • polyethylene glycol/lytes (MIRALAX) PACKET 1 Packet  1 Packet Oral BID PRN Lindsey Shahid, A.P.R.N.       • magnesium hydroxide (MILK OF MAGNESIA) suspension 30 mL  30 mL Oral QDAY PRN Lindsey Shahid, A.P.R.N.       • bisacodyl (DULCOLAX) suppository 10 mg  10 mg Rectal Q24HRS PRN Lindsey Shahid, A.P.R.N.       • sodium phosphate (Fleet) enema 133 mL  1 Each Rectal Once PRN Lindsey Shahid, A.P.R.N.       • artificial tears (EYE LUBRICANT) ophth ointment 1 Application  1 Application Both Eyes Q8HRS Lindsey Shahid, A.P.R.N.       • acetaminophen (TYLENOL) tablet 1,000 mg  1,000 mg Oral Q6HRS Lindsey Shahid, A.P.R.N.   1,000 mg at 04/14/22 0505    Followed by   • [START ON 4/18/2022] acetaminophen (TYLENOL) tablet 1,000 mg  1,000 mg Oral Q6HRS PRN Lindsey Shahid, A.P.R.N.       • oxyCODONE immediate-release (ROXICODONE) tablet 5 mg  5 mg Oral Q3HRS PRN Lindsey Shahid, A.P.R.N.   5 mg at 04/13/22 1822    Or   • oxyCODONE immediate-release (ROXICODONE) tablet 10 mg  10 mg Oral Q3HRS PRN Lindsey Shahid, A.P.R.N.        Or   • morphine 4 MG/ML injection 4 mg  4 mg Intravenous Q3HRS PRN Lindsey Shahid, A.P.R.N.       • benzocaine-menthol (Cepacol) lozenge 1 Lozenge  1 Lozenge Mouth/Throat Q2HRS PRN SHIRLEY Bhatt.       • metoclopramide (REGLAN) injection 5 mg  5 mg Intravenous Q6HRS PRN Brian Roth M.D.   5 mg at 04/13/22 1802     Current Outpatient Medications   Medication Sig Dispense Refill   • ondansetron (ZOFRAN ODT) 4 MG TABLET DISPERSIBLE Take 1 Tablet by mouth every 6 hours as needed for Nausea. 10 Tablet 0   • clopidogrel (PLAVIX) 75 MG Tab Take 75 mg by mouth every day.     • aspirin (ASA) 81 MG Chew Tab chewable tablet Chew 81 mg every day.     • amitriptyline (ELAVIL) 10 MG Tab  Take 10 mg by mouth every evening.     • acetaminophen (TYLENOL) 325 MG Tab Take 650 mg by mouth 2 times a day as needed for Moderate Pain.     • KURVELO 0.15-30 MG-MCG per tablet Take 1 Tablet by mouth every day.         Fluids    Intake/Output Summary (Last 24 hours) at 4/14/2022 1134  Last data filed at 4/14/2022 1000  Gross per 24 hour   Intake 2050 ml   Output 1485 ml   Net 565 ml       Laboratory          Recent Labs     04/14/22  0511   SODIUM 139   POTASSIUM 3.5*   CHLORIDE 107   CO2 21   BUN 10   CREATININE 0.65   MAGNESIUM 1.7   CALCIUM 7.8*     Recent Labs     04/14/22  0511   GLUCOSE 119*     Recent Labs     04/14/22  0511   WBC 8.6     Recent Labs     04/14/22  0511   RBC 3.93*   HEMOGLOBIN 12.4   HEMATOCRIT 36.8*   PLATELETCT 250       Imaging  X-Ray:  I have personally reviewed the images and compared with prior images.  EKG:  I have personally reviewed the images and compared with prior images.  CT:    Reviewed  Ultrasound:  Reviewed    Assessment/Plan  * Aneurysm of internal carotid artery- (present on admission)  Assessment & Plan  POD #1 for left common carotid artery angiography, Left internal carotid artery angiography, flow diverter stent placement with Dr Jensen for left paraopthalict ICA aneurysm  Strict blood pressure contro < 160- with art line monitoring and management.  Plan to DC Art line  Serial neurologic exams  Monitor vascular access site  Aspirin and plavix        Non-intractable vomiting with nausea  Assessment & Plan  Nausea and vomiting post left common carotid artery angiography, Left internal carotid artery angiography, flow diverter stent placement   Zofran  4mg Q4-6 hrs PRN nausea., vomiting.          VTE:  Contraindicated Plavix, ASA okay   Ulcer: Not Indicated  Lines: Arterial Line  Ongoing indication addressed and Jimenez Catheter  Ongoing indication addressed    I have performed a physical exam and reviewed and updated ROS and Plan today (4/14/2022). In review of  yesterday's note (4/13/2022), there are no changes except as documented above.     Discussed patient condition and risk of morbidity and/or mortality with Family, RN, RT, Therapies, Pharmacy, Dietary, Charge nurse / hot rounds, Patient and neurosurgery  The patient remains critically ill.  Critical care time = 35 minutes in directly providing and coordinating critical care and extensive data review.  No time overlap and excludes procedures.

## 2022-04-14 NOTE — HOSPITAL COURSE
32 yom female who presented 4/13/2022 with Hx of meningitis, migraines, miscarriages and  was found to have a 4mm supraclinoid left internal carotid aneurysm upon FU MRI for her frequent migraines.  She underwent  a flow diverter placement with cerebral angiogram by Dr Jensen 4/13/22. She was transferred to ICU post op for frequent hourly neruro checks as well as continuous BP monitoring with art line

## 2022-04-14 NOTE — CARE PLAN
The patient is Stable - Low risk of patient condition declining or worsening    Shift Goals  Clinical Goals: stable neuros  Patient Goals: decreased nausea  Family Goals: tolerate meals    Progress made toward(s) clinical / shift goals:  Patient educated on pain and interventions. Assessed q2h. Patient educated on plan of care. Encouraged to express questions and concerns.    Patient is not progressing towards the following goals:

## 2022-04-14 NOTE — ASSESSMENT & PLAN NOTE
Nausea and vomiting post left common carotid artery angiography, Left internal carotid artery angiography, flow diverter stent placement   Zofran  4mg Q4-6 hrs PRN nausea., vomiting.

## 2022-07-25 ENCOUNTER — HOSPITAL ENCOUNTER (OUTPATIENT)
Dept: RADIOLOGY | Facility: MEDICAL CENTER | Age: 32
End: 2022-07-25
Attending: NURSE PRACTITIONER
Payer: COMMERCIAL

## 2022-07-25 DIAGNOSIS — I67.1 CEREBRAL ANEURYSM, NONRUPTURED: ICD-10-CM

## 2022-07-25 PROCEDURE — 70544 MR ANGIOGRAPHY HEAD W/O DYE: CPT

## 2022-12-13 ENCOUNTER — HOSPITAL ENCOUNTER (OUTPATIENT)
Dept: RADIOLOGY | Facility: MEDICAL CENTER | Age: 32
End: 2022-12-13
Attending: OBSTETRICS & GYNECOLOGY
Payer: COMMERCIAL

## 2022-12-13 DIAGNOSIS — Z80.3 FH: BREAST CANCER: ICD-10-CM

## 2022-12-13 DIAGNOSIS — N64.4 BREAST PAIN: ICD-10-CM

## 2022-12-13 PROCEDURE — G0279 TOMOSYNTHESIS, MAMMO: HCPCS

## 2022-12-13 PROCEDURE — 76642 ULTRASOUND BREAST LIMITED: CPT | Mod: RT

## 2023-02-16 RX ADMIN — TRETIONIN 1: 0.25 CREAM TOPICAL at 00:00

## 2023-02-17 ENCOUNTER — APPOINTMENT (RX ONLY)
Dept: URBAN - METROPOLITAN AREA CLINIC 22 | Facility: CLINIC | Age: 33
Setting detail: DERMATOLOGY
End: 2023-02-17

## 2023-02-17 DIAGNOSIS — L70.0 ACNE VULGARIS: ICD-10-CM

## 2023-02-17 DIAGNOSIS — D18.0 HEMANGIOMA: ICD-10-CM

## 2023-02-17 DIAGNOSIS — L81.4 OTHER MELANIN HYPERPIGMENTATION: ICD-10-CM

## 2023-02-17 DIAGNOSIS — L82.1 OTHER SEBORRHEIC KERATOSIS: ICD-10-CM

## 2023-02-17 DIAGNOSIS — Z71.89 OTHER SPECIFIED COUNSELING: ICD-10-CM

## 2023-02-17 DIAGNOSIS — D22 MELANOCYTIC NEVI: ICD-10-CM

## 2023-02-17 PROBLEM — D48.5 NEOPLASM OF UNCERTAIN BEHAVIOR OF SKIN: Status: ACTIVE | Noted: 2023-02-17

## 2023-02-17 PROBLEM — D18.01 HEMANGIOMA OF SKIN AND SUBCUTANEOUS TISSUE: Status: ACTIVE | Noted: 2023-02-17

## 2023-02-17 PROBLEM — D22.5 MELANOCYTIC NEVI OF TRUNK: Status: ACTIVE | Noted: 2023-02-17

## 2023-02-17 PROCEDURE — ? PRESCRIPTION

## 2023-02-17 PROCEDURE — ? BIOPSY BY SHAVE METHOD

## 2023-02-17 PROCEDURE — ? COUNSELING

## 2023-02-17 PROCEDURE — ? SUNSCREEN RECOMMENDATIONS

## 2023-02-17 PROCEDURE — 99203 OFFICE O/P NEW LOW 30 MIN: CPT | Mod: 25

## 2023-02-17 PROCEDURE — 11102 TANGNTL BX SKIN SINGLE LES: CPT

## 2023-02-17 RX ORDER — TRETIONIN 0.25 MG/G
1 CREAM TOPICAL QD
Qty: 45 | Refills: 1 | Status: ERX | COMMUNITY
Start: 2023-02-16

## 2023-02-17 ASSESSMENT — LOCATION SIMPLE DESCRIPTION DERM
LOCATION SIMPLE: LEFT UPPER BACK
LOCATION SIMPLE: LEFT FOREARM
LOCATION SIMPLE: RIGHT FOREHEAD
LOCATION SIMPLE: ABDOMEN
LOCATION SIMPLE: LEFT THIGH
LOCATION SIMPLE: RIGHT UPPER BACK

## 2023-02-17 ASSESSMENT — LOCATION DETAILED DESCRIPTION DERM
LOCATION DETAILED: RIGHT INFERIOR MEDIAL FOREHEAD
LOCATION DETAILED: RIGHT MID-UPPER BACK
LOCATION DETAILED: LEFT INFERIOR UPPER BACK
LOCATION DETAILED: LEFT ANTERIOR PROXIMAL THIGH
LOCATION DETAILED: LEFT LATERAL ABDOMEN
LOCATION DETAILED: LEFT PROXIMAL DORSAL FOREARM

## 2023-02-17 ASSESSMENT — LOCATION ZONE DERM
LOCATION ZONE: ARM
LOCATION ZONE: LEG
LOCATION ZONE: FACE
LOCATION ZONE: TRUNK

## 2023-02-17 NOTE — PROCEDURE: COUNSELING
Detail Level: Generalized
Detail Level: Zone
Detail Level: Detailed
Dapsone Counseling: I discussed with the patient the risks of dapsone including but not limited to hemolytic anemia, agranulocytosis, rashes, methemoglobinemia, kidney failure, peripheral neuropathy, headaches, GI upset, and liver toxicity.  Patients who start dapsone require monitoring including baseline LFTs and weekly CBCs for the first month, then every month thereafter.  The patient verbalized understanding of the proper use and possible adverse effects of dapsone.  All of the patient's questions and concerns were addressed.
Isotretinoin Pregnancy And Lactation Text: This medication is Pregnancy Category X and is considered extremely dangerous during pregnancy. It is unknown if it is excreted in breast milk.
Spironolactone Counseling: Patient advised regarding risks of diarrhea, abdominal pain, hyperkalemia, birth defects (for female patients), liver toxicity and renal toxicity. The patient may need blood work to monitor liver and kidney function and potassium levels while on therapy. The patient verbalized understanding of the proper use and possible adverse effects of spironolactone.  All of the patient's questions and concerns were addressed.
Azelaic Acid Pregnancy And Lactation Text: This medication is considered safe during pregnancy and breast feeding.
Use Enhanced Medication Counseling?: No
Tazorac Counseling:  Patient advised that medication is irritating and drying.  Patient may need to apply sparingly and wash off after an hour before eventually leaving it on overnight.  The patient verbalized understanding of the proper use and possible adverse effects of tazorac.  All of the patient's questions and concerns were addressed.
Birth Control Pills Counseling: Birth Control Pill Counseling: I discussed with the patient the potential side effects of OCPs including but not limited to increased risk of stroke, heart attack, thrombophlebitis, deep venous thrombosis, hepatic adenomas, breast changes, GI upset, headaches, and depression.  The patient verbalized understanding of the proper use and possible adverse effects of OCPs. All of the patient's questions and concerns were addressed.
Winlevi Pregnancy And Lactation Text: This medication is considered safe during pregnancy and breastfeeding.
Erythromycin Pregnancy And Lactation Text: This medication is Pregnancy Category B and is considered safe during pregnancy. It is also excreted in breast milk.
Sarecycline Counseling: Patient advised regarding possible photosensitivity and discoloration of the teeth, skin, lips, tongue and gums.  Patient instructed to avoid sunlight, if possible.  When exposed to sunlight, patients should wear protective clothing, sunglasses, and sunscreen.  The patient was instructed to call the office immediately if the following severe adverse effects occur:  hearing changes, easy bruising/bleeding, severe headache, or vision changes.  The patient verbalized understanding of the proper use and possible adverse effects of sarecycline.  All of the patient's questions and concerns were addressed.
Aklief Pregnancy And Lactation Text: It is unknown if this medication is safe to use during pregnancy.  It is unknown if this medication is excreted in breast milk.  Breastfeeding women should use the topical cream on the smallest area of the skin for the shortest time needed while breastfeeding.  Do not apply to nipple and areola.
Topical Clindamycin Counseling: Patient counseled that this medication may cause skin irritation or allergic reactions.  In the event of skin irritation, the patient was advised to reduce the amount of the drug applied or use it less frequently.   The patient verbalized understanding of the proper use and possible adverse effects of clindamycin.  All of the patient's questions and concerns were addressed.
Bactrim Counseling:  I discussed with the patient the risks of sulfa antibiotics including but not limited to GI upset, allergic reaction, drug rash, diarrhea, dizziness, photosensitivity, and yeast infections.  Rarely, more serious reactions can occur including but not limited to aplastic anemia, agranulocytosis, methemoglobinemia, blood dyscrasias, liver or kidney failure, lung infiltrates or desquamative/blistering drug rashes.
Spironolactone Pregnancy And Lactation Text: This medication can cause feminization of the male fetus and should be avoided during pregnancy. The active metabolite is also found in breast milk.
Dapsone Pregnancy And Lactation Text: This medication is Pregnancy Category C and is not considered safe during pregnancy or breast feeding.
Azithromycin Counseling:  I discussed with the patient the risks of azithromycin including but not limited to GI upset, allergic reaction, drug rash, diarrhea, and yeast infections.
High Dose Vitamin A Counseling: Side effects reviewed, pt to contact office should one occur.
Topical Sulfur Applications Pregnancy And Lactation Text: This medication is Pregnancy Category C and has an unknown safety profile during pregnancy. It is unknown if this topical medication is excreted in breast milk.
Topical Retinoid counseling:  Patient advised to apply a pea-sized amount only at bedtime and wait 30 minutes after washing their face before applying.  If too drying, patient may add a non-comedogenic moisturizer. The patient verbalized understanding of the proper use and possible adverse effects of retinoids.  All of the patient's questions and concerns were addressed.
Tetracycline Pregnancy And Lactation Text: This medication is Pregnancy Category D and not consider safe during pregnancy. It is also excreted in breast milk.
Doxycycline Pregnancy And Lactation Text: This medication is Pregnancy Category D and not consider safe during pregnancy. It is also excreted in breast milk but is considered safe for shorter treatment courses.
Minocycline Counseling: Patient advised regarding possible photosensitivity and discoloration of the teeth, skin, lips, tongue and gums.  Patient instructed to avoid sunlight, if possible.  When exposed to sunlight, patients should wear protective clothing, sunglasses, and sunscreen.  The patient was instructed to call the office immediately if the following severe adverse effects occur:  hearing changes, easy bruising/bleeding, severe headache, or vision changes.  The patient verbalized understanding of the proper use and possible adverse effects of minocycline.  All of the patient's questions and concerns were addressed.
Benzoyl Peroxide Counseling: Patient counseled that medicine may cause skin irritation and bleach clothing.  In the event of skin irritation, the patient was advised to reduce the amount of the drug applied or use it less frequently.   The patient verbalized understanding of the proper use and possible adverse effects of benzoyl peroxide.  All of the patient's questions and concerns were addressed.
Topical Clindamycin Pregnancy And Lactation Text: This medication is Pregnancy Category B and is considered safe during pregnancy. It is unknown if it is excreted in breast milk.
Azelaic Acid Counseling: Patient counseled that medicine may cause skin irritation and to avoid applying near the eyes.  In the event of skin irritation, the patient was advised to reduce the amount of the drug applied or use it less frequently.   The patient verbalized understanding of the proper use and possible adverse effects of azelaic acid.  All of the patient's questions and concerns were addressed.
Tazorac Pregnancy And Lactation Text: This medication is not safe during pregnancy. It is unknown if this medication is excreted in breast milk.
Birth Control Pills Pregnancy And Lactation Text: This medication should be avoided if pregnant and for the first 30 days post-partum.
Isotretinoin Counseling: Patient should get monthly blood tests, not donate blood, not drive at night if vision affected, not share medication, and not undergo elective surgery for 6 months after tx completed. Side effects reviewed, pt to contact office should one occur.
Aklief counseling:  Patient advised to apply a pea-sized amount only at bedtime and wait 30 minutes after washing their face before applying.  If too drying, patient may add a non-comedogenic moisturizer.  The most commonly reported side effects including irritation, redness, scaling, dryness, stinging, burning, itching, and increased risk of sunburn.  The patient verbalized understanding of the proper use and possible adverse effects of retinoids.  All of the patient's questions and concerns were addressed.
Topical Retinoid Pregnancy And Lactation Text: This medication is Pregnancy Category C. It is unknown if this medication is excreted in breast milk.
Winlevi Counseling:  I discussed with the patient the risks of topical clascoterone including but not limited to erythema, scaling, itching, and stinging. Patient voiced their understanding.
Bactrim Pregnancy And Lactation Text: This medication is Pregnancy Category D and is known to cause fetal risk.  It is also excreted in breast milk.
Erythromycin Counseling:  I discussed with the patient the risks of erythromycin including but not limited to GI upset, allergic reaction, drug rash, diarrhea, increase in liver enzymes, and yeast infections.
Topical Sulfur Applications Counseling: Topical Sulfur Counseling: Patient counseled that this medication may cause skin irritation or allergic reactions.  In the event of skin irritation, the patient was advised to reduce the amount of the drug applied or use it less frequently.   The patient verbalized understanding of the proper use and possible adverse effects of topical sulfur application.  All of the patient's questions and concerns were addressed.
Tetracycline Counseling: Patient counseled regarding possible photosensitivity and increased risk for sunburn.  Patient instructed to avoid sunlight, if possible.  When exposed to sunlight, patients should wear protective clothing, sunglasses, and sunscreen.  The patient was instructed to call the office immediately if the following severe adverse effects occur:  hearing changes, easy bruising/bleeding, severe headache, or vision changes.  The patient verbalized understanding of the proper use and possible adverse effects of tetracycline.  All of the patient's questions and concerns were addressed. Patient understands to avoid pregnancy while on therapy due to potential birth defects.
High Dose Vitamin A Pregnancy And Lactation Text: High dose vitamin A therapy is contraindicated during pregnancy and breast feeding.
Benzoyl Peroxide Pregnancy And Lactation Text: This medication is Pregnancy Category C. It is unknown if benzoyl peroxide is excreted in breast milk.
Doxycycline Counseling:  Patient counseled regarding possible photosensitivity and increased risk for sunburn.  Patient instructed to avoid sunlight, if possible.  When exposed to sunlight, patients should wear protective clothing, sunglasses, and sunscreen.  The patient was instructed to call the office immediately if the following severe adverse effects occur:  hearing changes, easy bruising/bleeding, severe headache, or vision changes.  The patient verbalized understanding of the proper use and possible adverse effects of doxycycline.  All of the patient's questions and concerns were addressed.
Azithromycin Pregnancy And Lactation Text: This medication is considered safe during pregnancy and is also secreted in breast milk.

## 2023-02-17 NOTE — PROCEDURE: BIOPSY BY SHAVE METHOD
Detail Level: Detailed
Depth Of Biopsy: dermis
Was A Bandage Applied: Yes
Size Of Lesion In Cm: 0.5
X Size Of Lesion In Cm: 0.3
Biopsy Type: H and E
Biopsy Method: Dermablade
Anesthesia Type: 0.05% lidocaine without epinephrine
Additional Anesthesia Volume In Cc (Will Not Render If 0): 0
Hemostasis: Drysol
Wound Care: Petrolatum
Dressing: bandage
Destruction After The Procedure: No
Type Of Destruction Used: Curettage
Curettage Text: The wound bed was treated with curettage after the biopsy was performed.
Cryotherapy Text: The wound bed was treated with cryotherapy after the biopsy was performed.
Electrodesiccation Text: The wound bed was treated with electrodesiccation after the biopsy was performed.
Electrodesiccation And Curettage Text: The wound bed was treated with electrodesiccation and curettage after the biopsy was performed.
Silver Nitrate Text: The wound bed was treated with silver nitrate after the biopsy was performed.
Lab: [id number]
Lab Facility: 
Consent: Written consent was obtained and risks were reviewed including but not limited to scarring, infection, bleeding, scabbing, incomplete removal, nerve damage and allergy to anesthesia.
Post-Care Instructions: I reviewed with the patient in detail post-care instructions. Patient is to keep the biopsy site dry overnight, and then apply bacitracin twice daily until healed. Patient may apply hydrogen peroxide soaks to remove any crusting.
Notification Instructions: Patient will be notified of biopsy results. However, patient instructed to call the office if not contacted within 2 weeks.
Billing Type: Third-Party Bill
Information: Selecting Yes will display possible errors in your note based on the variables you have selected. This validation is only offered as a suggestion for you. PLEASE NOTE THAT THE VALIDATION TEXT WILL BE REMOVED WHEN YOU FINALIZE YOUR NOTE. IF YOU WANT TO FAX A PRELIMINARY NOTE YOU WILL NEED TO TOGGLE THIS TO 'NO' IF YOU DO NOT WANT IT IN YOUR FAXED NOTE.

## 2023-03-01 ENCOUNTER — NON-PROVIDER VISIT (OUTPATIENT)
Dept: GENETICS | Facility: MEDICAL CENTER | Age: 33
End: 2023-03-01
Payer: COMMERCIAL

## 2023-03-01 DIAGNOSIS — Z80.3 FAMILY HISTORY OF MALIGNANT NEOPLASM OF BREAST: ICD-10-CM

## 2023-03-01 PROCEDURE — 36415 COLL VENOUS BLD VENIPUNCTURE: CPT | Performed by: STUDENT IN AN ORGANIZED HEALTH CARE EDUCATION/TRAINING PROGRAM

## 2023-03-01 NOTE — PROGRESS NOTES
Dusty Lambert is a 33 y.o. female here for a non-provider visit for: Lab Draws  on 3/1/2023 at 10:49 AM    Procedure Performed: Venipuncture     Anatomical site: Left Antecubital Area (AC)    Equipment used: 25 butterfly    Labs drawn: [employer] (two lavender EDTA tubes)    Ordering Provider: Tasted Menu    Cody By: Symone Luis, Steve Ass't

## 2023-03-27 ENCOUNTER — OFFICE VISIT (OUTPATIENT)
Dept: URGENT CARE | Facility: PHYSICIAN GROUP | Age: 33
End: 2023-03-27
Payer: COMMERCIAL

## 2023-03-27 VITALS
BODY MASS INDEX: 35 KG/M2 | RESPIRATION RATE: 18 BRPM | HEIGHT: 67 IN | TEMPERATURE: 96.8 F | DIASTOLIC BLOOD PRESSURE: 60 MMHG | SYSTOLIC BLOOD PRESSURE: 120 MMHG | WEIGHT: 223 LBS | HEART RATE: 77 BPM | OXYGEN SATURATION: 98 %

## 2023-03-27 DIAGNOSIS — J06.9 UPPER RESPIRATORY INFECTION, ACUTE: Primary | ICD-10-CM

## 2023-03-27 DIAGNOSIS — R11.0 NAUSEA: ICD-10-CM

## 2023-03-27 DIAGNOSIS — R05.1 ACUTE COUGH: ICD-10-CM

## 2023-03-27 DIAGNOSIS — J02.9 SORE THROAT: ICD-10-CM

## 2023-03-27 LAB — S PYO DNA SPEC NAA+PROBE: NOT DETECTED

## 2023-03-27 PROCEDURE — 87651 STREP A DNA AMP PROBE: CPT | Performed by: NURSE PRACTITIONER

## 2023-03-27 PROCEDURE — 99214 OFFICE O/P EST MOD 30 MIN: CPT | Performed by: NURSE PRACTITIONER

## 2023-03-27 RX ORDER — BENZONATATE 100 MG/1
100 CAPSULE ORAL 3 TIMES DAILY PRN
Qty: 60 CAPSULE | Refills: 0 | Status: SHIPPED | OUTPATIENT
Start: 2023-03-27

## 2023-03-27 RX ORDER — ONDANSETRON 4 MG/1
4 TABLET, ORALLY DISINTEGRATING ORAL EVERY 8 HOURS PRN
Qty: 10 TABLET | Refills: 0 | Status: SHIPPED | OUTPATIENT
Start: 2023-03-27

## 2023-03-27 RX ORDER — TOPIRAMATE 25 MG/1
25 TABLET ORAL 2 TIMES DAILY
COMMUNITY
Start: 2023-03-17

## 2023-03-27 NOTE — PROGRESS NOTES
Dusty Lambert is a 33 y.o. female who presents for Pharyngitis (X 3 days sore throat, chest burning, cough)      HPI  This is a new problem. Dusty Lambert is a 33 y.o. patient who presents to urgent care with c/o: coughing and burning sore throat. Cough started first. Hurts in her throat to breath. Hurts to swallow. Dtr is sick with coughing illness.  No fever, body aches. Felt extra cold this morning. Intermittently nauseated. Ran out of zofran. No emesis.  Would like to have her zofran refilled.   Treatments tried: none  Denies fever, chills, sob, ear pain, nasal drainage.    No other aggravating or alleviating factors.       ROS See HPI    Allergies:       Allergies   Allergen Reactions    Sulfa Drugs Hives    Other Food Hives     Cranberries and radishes    Other Food Hives     Radishes (Raphanus Sativus)    Vancomycin Rash     Pt currently red and blotchy on anterior and posterior thoracic area from previous dose of Vanco. Tolerated vancomycin 6/29/18 without issue (suspect infusion related reaction).       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    Chest pain recently    Cervical dysplasia     Dental disorder     Invisaline upper and lower teeth    Hemorrhagic disorder (HCC)     Brain aneurysm    Migraine     Thyroid nodule     small left lobe nodule     Past Surgical History:   Procedure Laterality Date    ORIF, ANKLE  3/2/2012    Performed by PIEDAD NEFF at SURGERY [facility] ORS    TURBINOPLASTY  12/19/2008    Performed by EMILIANO LUNA at [facility] ORS    NASAL SEPTAL RECONSTRUCTION  12/19/2008    Performed by EMILIANO LUNA at [facility] ORS    NASAL SEPTAL RECONST  2006    NASAL SEPTAL RECONSTRUCTION  2005    TONSILLECTOMY AND ADENOIDECTOMY  1997    OTHER ORTHOPEDIC SURGERY       Family History   Problem Relation Age of Onset    Stroke Father     Cancer Mother 52        breast     Social History      Tobacco Use    Smoking status: Former     Types: Cigarettes     Quit date: 2011     Years since quittin.6    Smokeless tobacco: Never    Tobacco comments:     was smoking Hooka/hashish 3-4 times a week, quit 13   Substance Use Topics    Alcohol use: Not Currently     Alcohol/week: 1.2 oz     Types: 2 Standard drinks or equivalent per week     Comment: 1-2x a week       Problems:   Patient Active Problem List   Diagnosis    Thyroid nodule    Family planning    Restless leg syndrome    Meningitis    Miscarriage    Class 1 obesity due to excess calories without serious comorbidity with body mass index (BMI) of 32.0 to 32.9 in adult    At risk for postpartum depression    Lactation disorder, postpartum condition    Other chest pain    Aneurysm of internal carotid artery    Aneurysm (HCC)    Non-intractable vomiting with nausea       Medications:   Current Outpatient Medications on File Prior to Visit   Medication Sig Dispense Refill    topiramate (TOPAMAX) 25 MG Tab Take 25 mg by mouth 2 times a day.      aspirin (ASA) 81 MG Chew Tab chewable tablet Chew 81 mg every day.      ondansetron (ZOFRAN ODT) 4 MG TABLET DISPERSIBLE Take 1 Tablet by mouth every 6 hours as needed for Nausea. (Patient not taking: Reported on 3/27/2023) 10 Tablet 0    clopidogrel (PLAVIX) 75 MG Tab Take 75 mg by mouth every day. (Patient not taking: Reported on 3/27/2023)      amitriptyline (ELAVIL) 10 MG Tab Take 10 mg by mouth every evening. (Patient not taking: Reported on 3/27/2023)      acetaminophen (TYLENOL) 325 MG Tab Take 650 mg by mouth 2 times a day as needed for Moderate Pain. (Patient not taking: Reported on 3/27/2023)      KURVELO 0.15-30 MG-MCG per tablet Take 1 Tablet by mouth every day. (Patient not taking: Reported on 3/27/2023)       No current facility-administered medications on file prior to visit.          Objective:     /60 (BP Location: Left arm, Patient Position: Sitting, BP Cuff Size: Adult)    "Pulse 77   Temp 36 °C (96.8 °F) (Temporal)   Resp 18   Ht 1.702 m (5' 7\")   Wt 101 kg (223 lb)   SpO2 98%   BMI 34.93 kg/m²     Physical Exam  Vitals and nursing note reviewed.   Constitutional:       General: She is not in acute distress.     Appearance: Normal appearance. She is well-developed. She is not toxic-appearing.   HENT:      Head: Normocephalic.      Right Ear: Hearing, tympanic membrane, ear canal and external ear normal.      Left Ear: Hearing, tympanic membrane, ear canal and external ear normal.      Nose: Nose normal.      Right Sinus: No frontal sinus tenderness.      Left Sinus: No frontal sinus tenderness.      Mouth/Throat:      Mouth: Mucous membranes are moist.      Pharynx: Uvula midline. Posterior oropharyngeal erythema present. No oropharyngeal exudate.      Tonsils: No tonsillar abscesses.   Eyes:      General: Lids are normal.   Neck:      Trachea: Trachea and phonation normal.   Cardiovascular:      Rate and Rhythm: Normal rate and regular rhythm.      Pulses: Normal pulses.      Heart sounds: Normal heart sounds.   Pulmonary:      Effort: Pulmonary effort is normal. No respiratory distress.      Breath sounds: Normal breath sounds.   Musculoskeletal:         General: Normal range of motion.      Cervical back: Full passive range of motion without pain, normal range of motion and neck supple.   Lymphadenopathy:      Head:      Right side of head: No tonsillar adenopathy.      Left side of head: No tonsillar adenopathy.      Cervical: No cervical adenopathy.      Upper Body:      Right upper body: No supraclavicular adenopathy.      Left upper body: No supraclavicular adenopathy.   Skin:     General: Skin is warm and dry.      Capillary Refill: Capillary refill takes less than 2 seconds.      Findings: No rash.   Neurological:      Mental Status: She is alert and oriented to person, place, and time.      Deep Tendon Reflexes: Reflexes are normal and symmetric.   Psychiatric:         " Mood and Affect: Mood normal.         Speech: Speech normal.         Behavior: Behavior normal.         Thought Content: Thought content normal.       Results for orders placed or performed in visit on 03/27/23   POCT GROUP A STREP, PCR   Result Value Ref Range    POC Group A Strep, PCR Not Detected Not Detected, Invalid       Assessment /Associated Orders:      1. Upper respiratory infection, acute        2. Sore throat  POCT GROUP A STREP, PCR      3. Nausea  ondansetron (ZOFRAN ODT) 4 MG TABLET DISPERSIBLE      4. Acute cough  benzonatate (TESSALON) 100 MG Cap            Medical Decision Making:    Pt is clinically stable at today's acute urgent care visit.  No acute distress noted. Appropriate for outpatient care at this time.   Acute problem today with uncertain prognosis.   Educated in proper administration of  prescription medication(s) ordered today including safety, possible SE, risks, benefits, rationale and alternatives to therapy.    Salt water gargles BID and prn. Suggested 1/4 to 1/2 teaspoon (1.5 to 3.0 g) of salt per one cup (8 ounces or 250 mL) of warm water.   OTC throat analgesic spray or lozenge of choice prn throat pain. Dosage and directions per   Keep well hydrated  Educated in proper administration of  prescription medication(s) ordered today including safety, possible SE, risks, benefits, rationale and alternatives to therapy.      Discussed Dx, management options (risks,benefits, and alternatives to planned treatment), natural progression and supportive care.  Expressed understanding and the treatment plan was agreed upon.   Questions were encouraged and answered   Return to urgent care prn if new or worsening sx or if there is no improvement in condition prn.    Educated in Red flags and indications to immediately call 911 or present to the Emergency Department.       Time I spent evaluating Dusty Lambert in urgent care today was 32  minutes. This time includes  preparing for visit, reviewing any pertinent notes or test results, counseling/education, exam, obtaining HPI, interpretation of lab tests, medication management and documentation as indicated above.Time does not include separately billable procedures noted .       Please note that this dictation was created using voice recognition software. I have worked with consultants from the vendor as well as technical experts from [facility] to optimize the interface. I have made every reasonable attempt to correct obvious errors, but I expect that there are errors of grammar and possibly content that I did not discover before finalizing the note.  This note was electronically signed by provider

## 2023-05-24 DIAGNOSIS — R11.0 NAUSEA: ICD-10-CM

## 2023-08-22 RX ORDER — ONDANSETRON 4 MG/1
TABLET, ORALLY DISINTEGRATING ORAL
Qty: 10 TABLET | Refills: 0 | OUTPATIENT
Start: 2023-08-22

## 2023-08-25 ENCOUNTER — APPOINTMENT (OUTPATIENT)
Dept: RADIOLOGY | Facility: MEDICAL CENTER | Age: 33
End: 2023-08-25
Attending: NEUROLOGICAL SURGERY
Payer: COMMERCIAL

## 2023-08-25 DIAGNOSIS — I67.1 CEREBRAL ANEURYSM, NONRUPTURED: ICD-10-CM

## 2023-08-25 PROCEDURE — 70544 MR ANGIOGRAPHY HEAD W/O DYE: CPT

## 2024-01-28 ENCOUNTER — APPOINTMENT (OUTPATIENT)
Dept: URGENT CARE | Facility: PHYSICIAN GROUP | Age: 34
End: 2024-01-28
Payer: COMMERCIAL

## 2025-02-14 ENCOUNTER — HOSPITAL ENCOUNTER (OUTPATIENT)
Dept: LAB | Facility: MEDICAL CENTER | Age: 35
End: 2025-02-14
Attending: FAMILY MEDICINE
Payer: COMMERCIAL

## 2025-02-14 PROCEDURE — 86147 CARDIOLIPIN ANTIBODY EA IG: CPT | Mod: 91

## 2025-02-14 PROCEDURE — 85730 THROMBOPLASTIN TIME PARTIAL: CPT

## 2025-02-14 PROCEDURE — 82595 ASSAY OF CRYOGLOBULIN: CPT

## 2025-02-14 PROCEDURE — 86038 ANTINUCLEAR ANTIBODIES: CPT

## 2025-02-14 PROCEDURE — 36415 COLL VENOUS BLD VENIPUNCTURE: CPT

## 2025-02-14 PROCEDURE — 85610 PROTHROMBIN TIME: CPT

## 2025-02-14 PROCEDURE — 85613 RUSSELL VIPER VENOM DILUTED: CPT

## 2025-02-16 LAB — NUCLEAR IGG SER QL IA: NORMAL

## 2025-02-19 LAB
APTT SCREEN TO CONFIRM RATIO: 0.93 {RATIO}
CONFIRM DRVVT STA-STACLOT: NORMAL S
DRVVT SCREEN TO CONFIRM RATIO: 0.92 {RATIO}
DRVVT SCREEN TO CONFIRM RATIO: NORMAL {RATIO}
DRVVT/DRVVT CFM P DOAC NEUT NORM PPP-RTO: NORMAL {RATIO}
HEPARIN NT PPP QL: NORMAL
LA 3 SCREEN W REFLEX-IMP: NORMAL
LMW HEPARIN IND PLT AB SER QL: NORMAL
MIXING DRVVT: NORMAL S
PROTHROMBIN TIME: 13 S (ref 12–15.5)
SCREEN APTT: NORMAL S
THROMBIN TIME: NORMAL S

## 2025-02-21 LAB — CRYOGLOB SER QL 3D COLD INC: NORMAL

## 2025-02-22 LAB
CARDIOLIPIN IGA SER IA-ACNC: <10 APL
CARDIOLIPIN IGG SER IA-ACNC: <10 GPL
CARDIOLIPIN IGM SER IA-ACNC: <10 MPL

## 2025-06-23 ENCOUNTER — HOSPITAL ENCOUNTER (OUTPATIENT)
Facility: MEDICAL CENTER | Age: 35
End: 2025-06-23
Attending: FAMILY MEDICINE
Payer: COMMERCIAL

## 2025-06-23 PROCEDURE — 88142 CYTOPATH C/V THIN LAYER: CPT

## 2025-06-23 PROCEDURE — 87624 HPV HI-RISK TYP POOLED RSLT: CPT

## 2025-06-27 ENCOUNTER — HOSPITAL ENCOUNTER (OUTPATIENT)
Dept: LAB | Facility: MEDICAL CENTER | Age: 35
End: 2025-06-27
Attending: FAMILY MEDICINE
Payer: COMMERCIAL

## 2025-06-27 LAB
25(OH)D3 SERPL-MCNC: 40 NG/ML (ref 30–100)
ALBUMIN SERPL BCP-MCNC: 4.3 G/DL (ref 3.2–4.9)
ALBUMIN/GLOB SERPL: 1.8 G/DL
ALP SERPL-CCNC: 50 U/L (ref 30–99)
ALT SERPL-CCNC: 18 U/L (ref 2–50)
ANION GAP SERPL CALC-SCNC: 9 MMOL/L (ref 7–16)
AST SERPL-CCNC: 24 U/L (ref 12–45)
BASOPHILS # BLD AUTO: 0.6 % (ref 0–1.8)
BASOPHILS # BLD: 0.04 K/UL (ref 0–0.12)
BILIRUB SERPL-MCNC: 0.4 MG/DL (ref 0.1–1.5)
BUN SERPL-MCNC: 15 MG/DL (ref 8–22)
CALCIUM ALBUM COR SERPL-MCNC: 8.6 MG/DL (ref 8.5–10.5)
CALCIUM SERPL-MCNC: 8.8 MG/DL (ref 8.5–10.5)
CHLORIDE SERPL-SCNC: 104 MMOL/L (ref 96–112)
CO2 SERPL-SCNC: 24 MMOL/L (ref 20–33)
CREAT SERPL-MCNC: 0.83 MG/DL (ref 0.5–1.4)
EOSINOPHIL # BLD AUTO: 0.06 K/UL (ref 0–0.51)
EOSINOPHIL NFR BLD: 1 % (ref 0–6.9)
ERYTHROCYTE [DISTWIDTH] IN BLOOD BY AUTOMATED COUNT: 44.3 FL (ref 35.9–50)
FASTING STATUS PATIENT QL REPORTED: NORMAL
FERRITIN SERPL-MCNC: 76.7 NG/ML (ref 10–291)
GFR SERPLBLD CREATININE-BSD FMLA CKD-EPI: 94 ML/MIN/1.73 M 2
GLOBULIN SER CALC-MCNC: 2.4 G/DL (ref 1.9–3.5)
GLUCOSE SERPL-MCNC: 85 MG/DL (ref 65–99)
HCT VFR BLD AUTO: 41.1 % (ref 37–47)
HGB BLD-MCNC: 13.5 G/DL (ref 12–16)
IMM GRANULOCYTES # BLD AUTO: 0.01 K/UL (ref 0–0.11)
IMM GRANULOCYTES NFR BLD AUTO: 0.2 % (ref 0–0.9)
IRON SERPL-MCNC: 104 UG/DL (ref 40–170)
LYMPHOCYTES # BLD AUTO: 2.24 K/UL (ref 1–4.8)
LYMPHOCYTES NFR BLD: 35.5 % (ref 22–41)
MCH RBC QN AUTO: 30.5 PG (ref 27–33)
MCHC RBC AUTO-ENTMCNC: 32.8 G/DL (ref 32.2–35.5)
MCV RBC AUTO: 92.8 FL (ref 81.4–97.8)
MONOCYTES # BLD AUTO: 0.4 K/UL (ref 0–0.85)
MONOCYTES NFR BLD AUTO: 6.3 % (ref 0–13.4)
NEUTROPHILS # BLD AUTO: 3.56 K/UL (ref 1.82–7.42)
NEUTROPHILS NFR BLD: 56.4 % (ref 44–72)
NRBC # BLD AUTO: 0 K/UL
NRBC BLD-RTO: 0 /100 WBC (ref 0–0.2)
PLATELET # BLD AUTO: 255 K/UL (ref 164–446)
PMV BLD AUTO: 10.2 FL (ref 9–12.9)
POTASSIUM SERPL-SCNC: 4 MMOL/L (ref 3.6–5.5)
PROT SERPL-MCNC: 6.7 G/DL (ref 6–8.2)
RBC # BLD AUTO: 4.43 M/UL (ref 4.2–5.4)
SODIUM SERPL-SCNC: 137 MMOL/L (ref 135–145)
T4 FREE SERPL-MCNC: 1.38 NG/DL (ref 0.93–1.7)
TSH SERPL-ACNC: 0.82 UIU/ML (ref 0.38–5.33)
VIT B12 SERPL-MCNC: 277 PG/ML (ref 211–911)
WBC # BLD AUTO: 6.3 K/UL (ref 4.8–10.8)

## 2025-06-27 PROCEDURE — 85025 COMPLETE CBC W/AUTO DIFF WBC: CPT

## 2025-06-27 PROCEDURE — 82607 VITAMIN B-12: CPT

## 2025-06-27 PROCEDURE — 82728 ASSAY OF FERRITIN: CPT

## 2025-06-27 PROCEDURE — 83540 ASSAY OF IRON: CPT

## 2025-06-27 PROCEDURE — 84439 ASSAY OF FREE THYROXINE: CPT

## 2025-06-27 PROCEDURE — 82306 VITAMIN D 25 HYDROXY: CPT

## 2025-06-27 PROCEDURE — 84443 ASSAY THYROID STIM HORMONE: CPT

## 2025-06-27 PROCEDURE — 36415 COLL VENOUS BLD VENIPUNCTURE: CPT

## 2025-06-27 PROCEDURE — 80053 COMPREHEN METABOLIC PANEL: CPT

## 2025-06-29 LAB
HPV I/H RISK 1 DNA SPEC QL NAA+PROBE: NOT DETECTED
SPECIMEN SOURCE: NORMAL
THINPREP PAP, CYTOLOGY NL11781: NORMAL

## 2025-07-10 ENCOUNTER — APPOINTMENT (OUTPATIENT)
Dept: LAB | Facility: MEDICAL CENTER | Age: 35
End: 2025-07-10
Payer: COMMERCIAL